# Patient Record
Sex: MALE | Race: WHITE | Employment: OTHER | URBAN - METROPOLITAN AREA
[De-identification: names, ages, dates, MRNs, and addresses within clinical notes are randomized per-mention and may not be internally consistent; named-entity substitution may affect disease eponyms.]

---

## 2021-01-22 ENCOUNTER — IMMUNIZATION (OUTPATIENT)
Dept: PRIMARY CARE CLINIC | Age: 81
End: 2021-01-22
Payer: MEDICARE

## 2021-01-22 PROCEDURE — 0001A COVID-19, PFIZER VACCINE 30MCG/0.3ML DOSE: CPT | Performed by: PHYSICIAN ASSISTANT

## 2021-01-22 PROCEDURE — 91300 COVID-19, PFIZER VACCINE 30MCG/0.3ML DOSE: CPT | Performed by: PHYSICIAN ASSISTANT

## 2021-02-12 ENCOUNTER — IMMUNIZATION (OUTPATIENT)
Dept: PRIMARY CARE CLINIC | Age: 81
End: 2021-02-12
Payer: MEDICARE

## 2021-02-12 PROCEDURE — 91300 COVID-19, PFIZER VACCINE 30MCG/0.3ML DOSE: CPT | Performed by: CLINICAL NURSE SPECIALIST

## 2021-02-12 PROCEDURE — 0002A COVID-19, PFIZER VACCINE 30MCG/0.3ML DOSE: CPT | Performed by: CLINICAL NURSE SPECIALIST

## 2021-10-11 ENCOUNTER — HOSPITAL ENCOUNTER (INPATIENT)
Age: 81
LOS: 14 days | Discharge: ANOTHER HOSPITAL | DRG: 287 | End: 2021-10-25
Attending: EMERGENCY MEDICINE | Admitting: FAMILY MEDICINE
Payer: MEDICARE

## 2021-10-11 ENCOUNTER — APPOINTMENT (OUTPATIENT)
Dept: GENERAL RADIOLOGY | Age: 81
DRG: 287 | End: 2021-10-11
Payer: MEDICARE

## 2021-10-11 DIAGNOSIS — R77.8 ELEVATED TROPONIN: ICD-10-CM

## 2021-10-11 DIAGNOSIS — F51.01 PRIMARY INSOMNIA: ICD-10-CM

## 2021-10-11 DIAGNOSIS — N17.9 ACUTE KIDNEY INJURY (HCC): Primary | ICD-10-CM

## 2021-10-11 LAB
ADENOVIRUS BY PCR: NOT DETECTED
ALBUMIN SERPL-MCNC: 3.5 G/DL (ref 3.5–5.2)
ALP BLD-CCNC: 110 U/L (ref 40–129)
ALT SERPL-CCNC: 23 U/L (ref 0–40)
ANION GAP SERPL CALCULATED.3IONS-SCNC: 14 MMOL/L (ref 7–16)
AST SERPL-CCNC: 57 U/L (ref 0–39)
BASOPHILS ABSOLUTE: 0.05 E9/L (ref 0–0.2)
BASOPHILS RELATIVE PERCENT: 0.5 % (ref 0–2)
BILIRUB SERPL-MCNC: 0.4 MG/DL (ref 0–1.2)
BORDETELLA PARAPERTUSSIS BY PCR: NOT DETECTED
BORDETELLA PERTUSSIS BY PCR: NOT DETECTED
BUN BLDV-MCNC: 31 MG/DL (ref 6–23)
CALCIUM SERPL-MCNC: 9.7 MG/DL (ref 8.6–10.2)
CHLAMYDOPHILIA PNEUMONIAE BY PCR: NOT DETECTED
CHLORIDE BLD-SCNC: 98 MMOL/L (ref 98–107)
CO2: 17 MMOL/L (ref 22–29)
CORONAVIRUS 229E BY PCR: NOT DETECTED
CORONAVIRUS HKU1 BY PCR: NOT DETECTED
CORONAVIRUS NL63 BY PCR: NOT DETECTED
CORONAVIRUS OC43 BY PCR: NOT DETECTED
CREAT SERPL-MCNC: 2.9 MG/DL (ref 0.7–1.2)
EOSINOPHILS ABSOLUTE: 0 E9/L (ref 0.05–0.5)
EOSINOPHILS RELATIVE PERCENT: 0 % (ref 0–6)
GFR AFRICAN AMERICAN: 25
GFR NON-AFRICAN AMERICAN: 21 ML/MIN/1.73
GLUCOSE BLD-MCNC: 161 MG/DL (ref 74–99)
HCT VFR BLD CALC: 42.1 % (ref 37–54)
HEMOGLOBIN: 13.4 G/DL (ref 12.5–16.5)
HUMAN METAPNEUMOVIRUS BY PCR: NOT DETECTED
HUMAN RHINOVIRUS/ENTEROVIRUS BY PCR: NOT DETECTED
IMMATURE GRANULOCYTES #: 0.1 E9/L
IMMATURE GRANULOCYTES %: 1 % (ref 0–5)
INFLUENZA A BY PCR: NOT DETECTED
INFLUENZA B BY PCR: NOT DETECTED
LYMPHOCYTES ABSOLUTE: 0.76 E9/L (ref 1.5–4)
LYMPHOCYTES RELATIVE PERCENT: 7.4 % (ref 20–42)
MAGNESIUM: 2.1 MG/DL (ref 1.6–2.6)
MCH RBC QN AUTO: 26.2 PG (ref 26–35)
MCHC RBC AUTO-ENTMCNC: 31.8 % (ref 32–34.5)
MCV RBC AUTO: 82.4 FL (ref 80–99.9)
MONOCYTES ABSOLUTE: 0.75 E9/L (ref 0.1–0.95)
MONOCYTES RELATIVE PERCENT: 7.3 % (ref 2–12)
MYCOPLASMA PNEUMONIAE BY PCR: NOT DETECTED
NEUTROPHILS ABSOLUTE: 8.59 E9/L (ref 1.8–7.3)
NEUTROPHILS RELATIVE PERCENT: 83.8 % (ref 43–80)
PARAINFLUENZA VIRUS 1 BY PCR: NOT DETECTED
PARAINFLUENZA VIRUS 2 BY PCR: NOT DETECTED
PARAINFLUENZA VIRUS 3 BY PCR: NOT DETECTED
PARAINFLUENZA VIRUS 4 BY PCR: NOT DETECTED
PDW BLD-RTO: 13.9 FL (ref 11.5–15)
PLATELET # BLD: 196 E9/L (ref 130–450)
PMV BLD AUTO: 12.5 FL (ref 7–12)
POTASSIUM SERPL-SCNC: 4.6 MMOL/L (ref 3.5–5)
PRO-BNP: 7283 PG/ML (ref 0–450)
RBC # BLD: 5.11 E12/L (ref 3.8–5.8)
RESPIRATORY SYNCYTIAL VIRUS BY PCR: NOT DETECTED
SARS-COV-2, NAAT: NOT DETECTED
SARS-COV-2, PCR: NOT DETECTED
SODIUM BLD-SCNC: 129 MMOL/L (ref 132–146)
TOTAL PROTEIN: 7.4 G/DL (ref 6.4–8.3)
TROPONIN, HIGH SENSITIVITY: 50 NG/L (ref 0–11)
TROPONIN, HIGH SENSITIVITY: 54 NG/L (ref 0–11)
WBC # BLD: 10.3 E9/L (ref 4.5–11.5)

## 2021-10-11 PROCEDURE — 93005 ELECTROCARDIOGRAM TRACING: CPT | Performed by: NURSE PRACTITIONER

## 2021-10-11 PROCEDURE — 84484 ASSAY OF TROPONIN QUANT: CPT

## 2021-10-11 PROCEDURE — 6370000000 HC RX 637 (ALT 250 FOR IP): Performed by: FAMILY MEDICINE

## 2021-10-11 PROCEDURE — 85025 COMPLETE CBC W/AUTO DIFF WBC: CPT

## 2021-10-11 PROCEDURE — 87635 SARS-COV-2 COVID-19 AMP PRB: CPT

## 2021-10-11 PROCEDURE — 83880 ASSAY OF NATRIURETIC PEPTIDE: CPT

## 2021-10-11 PROCEDURE — 80053 COMPREHEN METABOLIC PANEL: CPT

## 2021-10-11 PROCEDURE — 36415 COLL VENOUS BLD VENIPUNCTURE: CPT

## 2021-10-11 PROCEDURE — 71046 X-RAY EXAM CHEST 2 VIEWS: CPT

## 2021-10-11 PROCEDURE — 99283 EMERGENCY DEPT VISIT LOW MDM: CPT

## 2021-10-11 PROCEDURE — 2060000000 HC ICU INTERMEDIATE R&B

## 2021-10-11 PROCEDURE — 2580000003 HC RX 258: Performed by: GENERAL PRACTICE

## 2021-10-11 PROCEDURE — 0202U NFCT DS 22 TRGT SARS-COV-2: CPT

## 2021-10-11 PROCEDURE — 83735 ASSAY OF MAGNESIUM: CPT

## 2021-10-11 PROCEDURE — 2580000003 HC RX 258: Performed by: NURSE PRACTITIONER

## 2021-10-11 RX ORDER — SODIUM CHLORIDE 9 MG/ML
INJECTION, SOLUTION INTRAVENOUS CONTINUOUS
Status: DISCONTINUED | OUTPATIENT
Start: 2021-10-11 | End: 2021-10-13

## 2021-10-11 RX ORDER — POLYETHYLENE GLYCOL 3350 17 G/17G
17 POWDER, FOR SOLUTION ORAL DAILY PRN
Status: DISCONTINUED | OUTPATIENT
Start: 2021-10-11 | End: 2021-10-25 | Stop reason: HOSPADM

## 2021-10-11 RX ORDER — ACETAMINOPHEN 650 MG/1
650 SUPPOSITORY RECTAL EVERY 6 HOURS PRN
Status: DISCONTINUED | OUTPATIENT
Start: 2021-10-11 | End: 2021-10-25 | Stop reason: HOSPADM

## 2021-10-11 RX ORDER — 0.9 % SODIUM CHLORIDE 0.9 %
500 INTRAVENOUS SOLUTION INTRAVENOUS ONCE
Status: COMPLETED | OUTPATIENT
Start: 2021-10-11 | End: 2021-10-11

## 2021-10-11 RX ORDER — 0.9 % SODIUM CHLORIDE 0.9 %
1000 INTRAVENOUS SOLUTION INTRAVENOUS ONCE
Status: COMPLETED | OUTPATIENT
Start: 2021-10-11 | End: 2021-10-11

## 2021-10-11 RX ORDER — ONDANSETRON 4 MG/1
4 TABLET, ORALLY DISINTEGRATING ORAL EVERY 8 HOURS PRN
Status: DISCONTINUED | OUTPATIENT
Start: 2021-10-11 | End: 2021-10-25 | Stop reason: HOSPADM

## 2021-10-11 RX ORDER — ACETAMINOPHEN 325 MG/1
650 TABLET ORAL EVERY 6 HOURS PRN
Status: DISCONTINUED | OUTPATIENT
Start: 2021-10-11 | End: 2021-10-25 | Stop reason: HOSPADM

## 2021-10-11 RX ORDER — AMLODIPINE BESYLATE 10 MG/1
10 TABLET ORAL DAILY
Status: DISCONTINUED | OUTPATIENT
Start: 2021-10-12 | End: 2021-10-15

## 2021-10-11 RX ORDER — ROSUVASTATIN CALCIUM 10 MG/1
10 TABLET, COATED ORAL EVERY EVENING
Status: DISCONTINUED | OUTPATIENT
Start: 2021-10-11 | End: 2021-10-25 | Stop reason: HOSPADM

## 2021-10-11 RX ORDER — ONDANSETRON 2 MG/ML
4 INJECTION INTRAMUSCULAR; INTRAVENOUS EVERY 6 HOURS PRN
Status: DISCONTINUED | OUTPATIENT
Start: 2021-10-11 | End: 2021-10-20

## 2021-10-11 RX ADMIN — ROSUVASTATIN 10 MG: 10 TABLET, FILM COATED ORAL at 23:35

## 2021-10-11 RX ADMIN — SODIUM CHLORIDE 1000 ML: 9 INJECTION, SOLUTION INTRAVENOUS at 22:00

## 2021-10-11 RX ADMIN — SODIUM CHLORIDE 500 ML: 9 INJECTION, SOLUTION INTRAVENOUS at 20:14

## 2021-10-11 ASSESSMENT — ENCOUNTER SYMPTOMS
ABDOMINAL PAIN: 0
EYE DISCHARGE: 0
NAUSEA: 1
SORE THROAT: 0
BACK PAIN: 0
COUGH: 0
SHORTNESS OF BREATH: 0
EYE PAIN: 0
VOMITING: 0
SINUS PRESSURE: 0
WHEEZING: 0
EYE REDNESS: 0
DIARRHEA: 0

## 2021-10-11 NOTE — ED NOTES
FIRST PROVIDER CONTACT ASSESSMENT NOTE      Department of Emergency Medicine   Admit Date: No admission date for patient encounter. Chief Complaint: Fatigue (fatigue x1 week) and Extremity Weakness (weakness x1week)      History of Present Illness:    Abisai Cordero is a [de-identified] y.o. male who presents to the ED for fatigue, weakness, body aches, chills. Is fully vaccinated.       -----------------END OF FIRST PROVIDER CONTACT ASSESSMENT NOTE--------------  Electronically signed by CHOCO Lynn CNP   DD: 10/11/21               CHOCO Lott CNP  10/11/21 1041

## 2021-10-11 NOTE — LETTER
[place] Department Medicaid  CERTIFICATION OF NECESSITY  FOR NON-EMERGENCY TRANSPORTATION   BY GROUND AMBULANCE      Individual Information   1. Name: Meme Hollingsworth 2. [place] Medicaid Billing Number:    3. Address: [facility]      Transportation Provider Information   4. Provider Name: [facility]   5. [place] Medicaid Provider Number:  National Provider Identifier (NPI):      Certification  7. Criteria:  During transport, this individual requires:  [x] Medical treatment or continuous     supervision by an EMT. [] The administration or regulation of oxygen by another person. [] Supervised protective restraint. 8. Period Beginning Date: 10/25/21   9. Length  [x] Not more than 5 day(s)  [] One Year     Additional Information Relevant to Certification   10. Comments or Explanations, If Necessary or Appropriate     Going to a higher level of care for Cardiac workup and possible CABG that can  Not be done at this Facility. Certifying Practitioner Information   11. Name of Practitioner: Dr Judy Hamilton MD   12. [place] Medicaid Provider Number, If Applicable:  [address] Provider Identifier (NPI):      Signature Information   14. Date of Signature: 10/25/21 15. Name of Person Signing: Jamal Schmidt   12. Signature and Professional Designation: Electronically signed by GORDON Flores on 10/25/2021 at 10:30 AM     OD 96431  Rev. 7/2015    [facility] Encounter Date/Time: 10/11/2021 [address] Account: [de-identified]    MRN: [id number]    Patient: Meme Hollingsworth    Contact Serial #: [id number]      ENCOUNTER          Patient Class: I Private Enc?   No Unit RM BD: SEYZ 8SE [facility] [id number]/[id number]   Hospital Service: Intermediate   Encounter DX: Elevated troponin [R77.8]   ADM Provider: Charly Shine MD   Procedure:     ATT Provider: Charly Shine MD   REF Provider:        Admission DX: Elevated troponin, Acute kidney injury ([facility] [address] and DX codes: R77.8, N17.9    PATIENT                 Name: Sasha Flaherty : 1940 (81 yrs)   Address: [place] Sex: Male   [place] city: [facility] [address]         Marital Status:    Employer: RETIRED         Protestant: [employer]   Primary Care Provider: Melvin Zacarias MD         Primary Phone: [phone]   EMERGENCY CONTACT   Contact Name Legal Guardian? Relationship to Patient Home Phone Work Phone   1. Verla Cough  2. Javier Fuller      Spouse  Child [phone]              GUARANTOR            Guarantor: Sasha Flaherty     : 1940   Address: [address] [place] [address] Sex: [place]     [place] [address]     Relation to Patient: Self       Home Phone: [phone]   Guarantor ID: [id number]       Work Phone:     Guarantor Employer: RETIRED         Status: RETIRED      COVERAGE        PRIMARY INSURANCE   Payor: MEDICARE Plan: MEDICARE PART A AND B   Payor Address: [facility],  [facility], [facility]       Group Number:   Insurance Type: INDEMNITY   Subscriber Name: Mireille Weller : 1940   Subscriber ID: [id number] Tomy Bun. Rel. to Sub: Self   SECONDARY INSURANCE   Payor: UNITED HEALTHCARE Plan: [email or web address] Address:  [facility] [id number], [place] [address]          Group Number:   Insurance Type: INDEMNITY   Subscriber Name: Mireille Weller : 1940   Subscriber ID: [id number] Pat.  Rel. to Sub: SELF           CSN: [id number]

## 2021-10-11 NOTE — LETTER
Beneficiary Notification Letter  BPCI Advanced     Your Doctor or [address],    We wanted to let you know that your health care provider, Natacha Salmeron, has volunteered to take part in our [facility] for [employer]in [employer] Medicaid Services (CMS) Bundled Payments for [facility] (BPCI Advanced). This doesnt change your Medicare rights or benefits and you dont need to do anything. What are bundled payments? A bundled payment combines, or bundles together, payments that Medicare makes to your health care providers for the many different kinds of medical services you might get in a specific time period. In BPCI Advanced, this time period could include a hospital inpatient stay or outpatient procedure, plus 90 days. Why would Medicare bundle payments? Bundled payments are thought of as a value-based way to pay because health care providers are responsible for both the quality and cost of medical care they give. This is a relatively new way of paying health care providers compared to thefee-for-service way Medicare has traditionally paid, where providers are paid separately for each service they provide. Bundled payments encourage these providers to work together to provide better, more coordinated care during your hospital stay, or outpatient procedure, and through your recovery. What does BPCI Advance mean for me? Youre more likely to get even better care when hospitals, doctors, and other health care providers work together. In BPCI Advanced, hospitals, doctors, and other health care providers may be rewarded for providing better, more coordinated health care. Medicare will watch BPCI Advanced participants closely to make sure that you and other patients keep getting efficient, high quality care. What do I need to know about BPCI Advanced?   Whats most important for you to know is that your Medicare rights and benefits wont change because your health care provider is participating in [facility]. Medicare will keep covering all of your medically necessary services. Even though Medicare will pay your doctor in a different way under BPCI Advanced, how much you have to pay wont change. Health care providers and suppliers who are enrolled in Medicare will submit their Medicare claims like they always have. Youll have all the same Medicare rights and protections, including the right to choose which hospital, doctor, or other health care provider you see. If you dont want to get care from a health care provider whos participating in [address] [place], then youll have to choose a different health care provider whos not participating in the Model. How can I give feedback about my health care? Medicare might ask you to take a voluntary survey about the services and care you received from [address] [place] [address] during your hospital stay or outpatient procedure and for a specific period of time afterwards. You can decide whether you want to take the voluntary survey, but if you do, itll help Medicare make BPCI Advanced and the care of other Medicare patients better. If you have concerns or complaints about your care, you can:   · Talk to your doctor or health care provider. · Contact your Beneficiary and Family Centered Care Quality Improvement   Organization [facility]). You can get your BFCC-QIOs phone number  at  Medicare.gov/contacts or by calling 1-800-MEDICARE. TTY users can call  4-792.858.6377. Where can I learn more about BPCI Advanced? Learn more about BPCI Advanced at https://innovation.cms.gov/initiatives/bpci-advanced/:  · A list of all the hospitals and physician group practices in the country participating in [address] [place] [address]. · All of the inpatient and outpatient Clinical Episodes that are currently included under BPCI Advanced.  A Clinical Episode is a grouping of medical conditions or diagnoses that are included in the [address].

## 2021-10-12 ENCOUNTER — APPOINTMENT (OUTPATIENT)
Dept: ULTRASOUND IMAGING | Age: 81
DRG: 287 | End: 2021-10-12
Payer: MEDICARE

## 2021-10-12 ENCOUNTER — APPOINTMENT (OUTPATIENT)
Dept: NUCLEAR MEDICINE | Age: 81
DRG: 287 | End: 2021-10-12
Payer: MEDICARE

## 2021-10-12 LAB
ALBUMIN SERPL-MCNC: 3.4 G/DL (ref 3.5–5.2)
ALP BLD-CCNC: 110 U/L (ref 40–129)
ALT SERPL-CCNC: 29 U/L (ref 0–40)
ANION GAP SERPL CALCULATED.3IONS-SCNC: 13 MMOL/L (ref 7–16)
AST SERPL-CCNC: 89 U/L (ref 0–39)
BACTERIA: ABNORMAL /HPF
BASOPHILS ABSOLUTE: 0.06 E9/L (ref 0–0.2)
BASOPHILS RELATIVE PERCENT: 0.6 % (ref 0–2)
BILIRUB SERPL-MCNC: 0.4 MG/DL (ref 0–1.2)
BILIRUBIN URINE: ABNORMAL
BLOOD, URINE: ABNORMAL
BUN BLDV-MCNC: 34 MG/DL (ref 6–23)
CALCIUM SERPL-MCNC: 9.5 MG/DL (ref 8.6–10.2)
CHLORIDE BLD-SCNC: 102 MMOL/L (ref 98–107)
CLARITY: ABNORMAL
CO2: 17 MMOL/L (ref 22–29)
COLOR: YELLOW
CREAT SERPL-MCNC: 2.6 MG/DL (ref 0.7–1.2)
D DIMER: 2103 NG/ML DDU
EKG ATRIAL RATE: 73 BPM
EKG P AXIS: 71 DEGREES
EKG P-R INTERVAL: 210 MS
EKG Q-T INTERVAL: 362 MS
EKG QRS DURATION: 70 MS
EKG QTC CALCULATION (BAZETT): 398 MS
EKG R AXIS: 20 DEGREES
EKG T AXIS: -57 DEGREES
EKG VENTRICULAR RATE: 73 BPM
EOSINOPHILS ABSOLUTE: 0.01 E9/L (ref 0.05–0.5)
EOSINOPHILS RELATIVE PERCENT: 0.1 % (ref 0–6)
GFR AFRICAN AMERICAN: 29
GFR NON-AFRICAN AMERICAN: 24 ML/MIN/1.73
GLUCOSE BLD-MCNC: 149 MG/DL (ref 74–99)
GLUCOSE URINE: NEGATIVE MG/DL
HBA1C MFR BLD: 6.4 % (ref 4–5.6)
HCT VFR BLD CALC: 41.6 % (ref 37–54)
HEMOGLOBIN: 13.3 G/DL (ref 12.5–16.5)
IMMATURE GRANULOCYTES #: 0.09 E9/L
IMMATURE GRANULOCYTES %: 0.9 % (ref 0–5)
KETONES, URINE: NEGATIVE MG/DL
LEUKOCYTE ESTERASE, URINE: NEGATIVE
LYMPHOCYTES ABSOLUTE: 1.28 E9/L (ref 1.5–4)
LYMPHOCYTES RELATIVE PERCENT: 13 % (ref 20–42)
MCH RBC QN AUTO: 26.7 PG (ref 26–35)
MCHC RBC AUTO-ENTMCNC: 32 % (ref 32–34.5)
MCV RBC AUTO: 83.4 FL (ref 80–99.9)
METER GLUCOSE: 116 MG/DL (ref 74–99)
METER GLUCOSE: 134 MG/DL (ref 74–99)
METER GLUCOSE: 135 MG/DL (ref 74–99)
METER GLUCOSE: 163 MG/DL (ref 74–99)
MONOCYTES ABSOLUTE: 0.78 E9/L (ref 0.1–0.95)
MONOCYTES RELATIVE PERCENT: 8 % (ref 2–12)
NEUTROPHILS ABSOLUTE: 7.59 E9/L (ref 1.8–7.3)
NEUTROPHILS RELATIVE PERCENT: 77.4 % (ref 43–80)
NITRITE, URINE: NEGATIVE
PDW BLD-RTO: 14.1 FL (ref 11.5–15)
PH UA: 5.5 (ref 5–9)
PLATELET # BLD: 199 E9/L (ref 130–450)
PMV BLD AUTO: 12.3 FL (ref 7–12)
POTASSIUM REFLEX MAGNESIUM: 4.6 MMOL/L (ref 3.5–5)
PROTEIN UA: 30 MG/DL
RBC # BLD: 4.99 E12/L (ref 3.8–5.8)
RBC UA: ABNORMAL /HPF (ref 0–2)
SODIUM BLD-SCNC: 132 MMOL/L (ref 132–146)
SPECIFIC GRAVITY UA: >=1.03 (ref 1–1.03)
TOTAL PROTEIN: 7.4 G/DL (ref 6.4–8.3)
TROPONIN, HIGH SENSITIVITY: 165 NG/L (ref 0–11)
UROBILINOGEN, URINE: 0.2 E.U./DL
WBC # BLD: 9.8 E9/L (ref 4.5–11.5)
WBC UA: ABNORMAL /HPF (ref 0–5)

## 2021-10-12 PROCEDURE — 2580000003 HC RX 258: Performed by: FAMILY MEDICINE

## 2021-10-12 PROCEDURE — 84484 ASSAY OF TROPONIN QUANT: CPT

## 2021-10-12 PROCEDURE — 2060000000 HC ICU INTERMEDIATE R&B

## 2021-10-12 PROCEDURE — 80053 COMPREHEN METABOLIC PANEL: CPT

## 2021-10-12 PROCEDURE — 99222 1ST HOSP IP/OBS MODERATE 55: CPT | Performed by: INTERNAL MEDICINE

## 2021-10-12 PROCEDURE — 6370000000 HC RX 637 (ALT 250 FOR IP): Performed by: FAMILY MEDICINE

## 2021-10-12 PROCEDURE — 36415 COLL VENOUS BLD VENIPUNCTURE: CPT

## 2021-10-12 PROCEDURE — 78582 LUNG VENTILAT&PERFUS IMAGING: CPT

## 2021-10-12 PROCEDURE — 85025 COMPLETE CBC W/AUTO DIFF WBC: CPT

## 2021-10-12 PROCEDURE — 3430000000 HC RX DIAGNOSTIC RADIOPHARMACEUTICAL: Performed by: STUDENT IN AN ORGANIZED HEALTH CARE EDUCATION/TRAINING PROGRAM

## 2021-10-12 PROCEDURE — 6370000000 HC RX 637 (ALT 250 FOR IP): Performed by: INTERNAL MEDICINE

## 2021-10-12 PROCEDURE — A9539 TC99M PENTETATE: HCPCS | Performed by: STUDENT IN AN ORGANIZED HEALTH CARE EDUCATION/TRAINING PROGRAM

## 2021-10-12 PROCEDURE — 6360000002 HC RX W HCPCS: Performed by: FAMILY MEDICINE

## 2021-10-12 PROCEDURE — 85378 FIBRIN DEGRADE SEMIQUANT: CPT

## 2021-10-12 PROCEDURE — 81001 URINALYSIS AUTO W/SCOPE: CPT

## 2021-10-12 PROCEDURE — 76770 US EXAM ABDO BACK WALL COMP: CPT

## 2021-10-12 PROCEDURE — 83036 HEMOGLOBIN GLYCOSYLATED A1C: CPT

## 2021-10-12 PROCEDURE — 93010 ELECTROCARDIOGRAM REPORT: CPT | Performed by: INTERNAL MEDICINE

## 2021-10-12 PROCEDURE — 87088 URINE BACTERIA CULTURE: CPT

## 2021-10-12 PROCEDURE — APPSS60 APP SPLIT SHARED TIME 46-60 MINUTES: Performed by: PHYSICIAN ASSISTANT

## 2021-10-12 PROCEDURE — A9540 TC99M MAA: HCPCS | Performed by: STUDENT IN AN ORGANIZED HEALTH CARE EDUCATION/TRAINING PROGRAM

## 2021-10-12 PROCEDURE — 93970 EXTREMITY STUDY: CPT

## 2021-10-12 PROCEDURE — 82962 GLUCOSE BLOOD TEST: CPT

## 2021-10-12 RX ORDER — DEXTROSE MONOHYDRATE 50 MG/ML
100 INJECTION, SOLUTION INTRAVENOUS PRN
Status: DISCONTINUED | OUTPATIENT
Start: 2021-10-12 | End: 2021-10-25 | Stop reason: HOSPADM

## 2021-10-12 RX ORDER — INSULIN DETEMIR 100 [IU]/ML
25 INJECTION, SOLUTION SUBCUTANEOUS 2 TIMES DAILY
Status: ON HOLD | COMMUNITY
End: 2021-10-25 | Stop reason: HOSPADM

## 2021-10-12 RX ORDER — LOSARTAN POTASSIUM 50 MG/1
50 TABLET ORAL DAILY
Status: ON HOLD | COMMUNITY
End: 2021-10-25 | Stop reason: HOSPADM

## 2021-10-12 RX ORDER — DEXTROSE MONOHYDRATE 25 G/50ML
12.5 INJECTION, SOLUTION INTRAVENOUS PRN
Status: DISCONTINUED | OUTPATIENT
Start: 2021-10-12 | End: 2021-10-25 | Stop reason: HOSPADM

## 2021-10-12 RX ORDER — HEPARIN SODIUM 10000 [USP'U]/ML
5000 INJECTION, SOLUTION INTRAVENOUS; SUBCUTANEOUS EVERY 8 HOURS
Status: DISCONTINUED | OUTPATIENT
Start: 2021-10-13 | End: 2021-10-15 | Stop reason: SDUPTHER

## 2021-10-12 RX ORDER — ATORVASTATIN CALCIUM 20 MG/1
20 TABLET, FILM COATED ORAL DAILY
COMMUNITY

## 2021-10-12 RX ORDER — FENOFIBRATE 160 MG/1
160 TABLET ORAL DAILY
Status: ON HOLD | COMMUNITY
End: 2021-10-25 | Stop reason: HOSPADM

## 2021-10-12 RX ORDER — OXYBUTYNIN CHLORIDE 5 MG/1
5 TABLET ORAL DAILY
Status: ON HOLD | COMMUNITY
End: 2021-10-25 | Stop reason: HOSPADM

## 2021-10-12 RX ORDER — NICOTINE POLACRILEX 4 MG
15 LOZENGE BUCCAL PRN
Status: DISCONTINUED | OUTPATIENT
Start: 2021-10-12 | End: 2021-10-25 | Stop reason: HOSPADM

## 2021-10-12 RX ORDER — KIT FOR THE PREPARATION OF TECHNETIUM TC 99M PENTETATE 20 MG/1
63 INJECTION, POWDER, LYOPHILIZED, FOR SOLUTION INTRAVENOUS; RESPIRATORY (INHALATION)
Status: COMPLETED | OUTPATIENT
Start: 2021-10-12 | End: 2021-10-12

## 2021-10-12 RX ORDER — TAMSULOSIN HYDROCHLORIDE 0.4 MG/1
0.4 CAPSULE ORAL NIGHTLY
Status: ON HOLD | COMMUNITY
End: 2021-10-25 | Stop reason: HOSPADM

## 2021-10-12 RX ORDER — SODIUM BICARBONATE 650 MG/1
650 TABLET ORAL 2 TIMES DAILY
Status: DISCONTINUED | OUTPATIENT
Start: 2021-10-12 | End: 2021-10-13

## 2021-10-12 RX ORDER — INSULIN ASPART 100 [IU]/ML
0-32 INJECTION, SOLUTION INTRAVENOUS; SUBCUTANEOUS
Status: ON HOLD | COMMUNITY
End: 2021-10-25 | Stop reason: HOSPADM

## 2021-10-12 RX ADMIN — Medication 7.5 MILLICURIE: at 13:14

## 2021-10-12 RX ADMIN — AMLODIPINE BESYLATE 10 MG: 10 TABLET ORAL at 08:56

## 2021-10-12 RX ADMIN — SODIUM BICARBONATE 650 MG: 650 TABLET ORAL at 20:39

## 2021-10-12 RX ADMIN — KIT FOR THE PREPARATION OF TECHNETIUM TC 99M PENTETATE 63 MILLICURIE: 20 INJECTION, POWDER, LYOPHILIZED, FOR SOLUTION INTRAVENOUS; RESPIRATORY (INHALATION) at 13:14

## 2021-10-12 RX ADMIN — SODIUM CHLORIDE: 9 INJECTION, SOLUTION INTRAVENOUS at 20:39

## 2021-10-12 RX ADMIN — SODIUM CHLORIDE: 9 INJECTION, SOLUTION INTRAVENOUS at 09:47

## 2021-10-12 RX ADMIN — SODIUM BICARBONATE 650 MG: 650 TABLET ORAL at 16:25

## 2021-10-12 RX ADMIN — INSULIN LISPRO 2 UNITS: 100 INJECTION, SOLUTION INTRAVENOUS; SUBCUTANEOUS at 16:27

## 2021-10-12 RX ADMIN — ENOXAPARIN SODIUM 30 MG: 30 INJECTION SUBCUTANEOUS at 08:56

## 2021-10-12 ASSESSMENT — PAIN SCALES - GENERAL
PAINLEVEL_OUTOF10: 0

## 2021-10-12 NOTE — PROGRESS NOTES
Cardiology and pulmonology consults placed via [employer] serve. Unable to place nephrology consult at this time due to morning labs not being resulted.

## 2021-10-12 NOTE — CONSULTS
Nephrology Consult  [facility]  Yenifer Austin MD    CC:   arf    HPI:   The pt is an [de-identified] yo male with a pmh of dm, htn, hyperlipidemia, djd, prostate cancer 1999, ckd 3 b with baseline cr of 1.5 from 2014. He came in with weakness, sob, cough. He was given 2 L of saline in the er. Admission labs show na 129>132, co2 17, bun 34, cr 2.9>2.6, ca 9.5, alb 3.4, wbc 9.8, hgb 13.3, plt 199. He was started on ns at 100 ml/hr. outpt cozaar is on hold. shirley shows no hydro. He was given bactrim last week for a uti. His oral intake has been low. PMH:    Past Medical History:   Diagnosis Date    Acute pancreatitis     Diabetes mellitus ([facility]     History of snoring     HTN (hypertension)     Hyperlipidemia     Osteoarthritis     Preoperative clearance 8/29/2014    cardiac- Dr. Danita Soni; note in [facility] for surgery 9/23/2014    Preoperative clearance 9-19-14    medical clearance for OR 9-23-14 from Dr. Shelly Muller on paper chart    Prostate cancer [facility]) 1999    radiation, seed implant    Renal insufficiency        Patient Active Problem List   Diagnosis    HTN (hypertension)    Diabetes ([address]    Hyperlipidemia    Renal insufficiency    Osteoarthritis, knee    Acute kidney injury ([facility]       Meds:     insulin lispro  0-12 Units SubCUTAneous TID WC    insulin lispro  0-6 Units SubCUTAneous Nightly    [START ON 10/13/2021] heparin (porcine)  5,000 Units SubCUTAneous Q8H    amLODIPine  10 mg Oral Daily    rosuvastatin  10 mg Oral QPM        dextrose      sodium chloride 100 mL/hr at 10/12/21 0947       Meds prn:     glucose, dextrose, glucagon (rDNA), dextrose, perflutren lipid microspheres, ondansetron **OR** ondansetron, polyethylene glycol, acetaminophen **OR** acetaminophen    Meds prior to admission:     No current facility-administered medications on file prior to encounter.      Current Outpatient Medications on File Prior to Encounter   Medication Sig Dispense Refill    oxybutynin (DITROPAN) 5 MG tablet Take 5 mg by mouth daily      atorvastatin (LIPITOR) 20 MG tablet Take 20 mg by mouth daily      losartan (COZAAR) 50 MG tablet Take 50 mg by mouth daily      fenofibrate (TRIGLIDE) 160 MG tablet Take 160 mg by mouth daily      tamsulosin (FLOMAX) 0.4 MG capsule Take 0.4 mg by mouth nightly      insulin detemir (LEVEMIR FLEXTOUCH) 100 UNIT/ML injection pen Inject 25 Units into the skin 2 times daily      insulin aspart (NOVOLOG FLEXPEN) 100 UNIT/ML injection pen Inject 0-32 Units into the skin 3 times daily (before meals) *Per Sliding Scale*      amLODIPine (NORVASC) 10 MG tablet Take 10 mg by mouth daily. Instructed to take morning of surgery with a sip of water         Allergies:    Patient has no known allergies. Social History:     reports that he has never smoked. He has never used smokeless tobacco. He reports current alcohol use. He reports that he does not use drugs.     Family History:         Problem Relation Age of Onset    Heart Disease Father          age 72       ROS:     General: no fever, chills   Heent: no nasal congestion, sore throat   Resp: co sob  cough  Cardiac: no cp , le edema, palpitations  Gi: no nausea, vomiting, melena, abd pain, hematemesis  Gu: no hematuria, dysuria   Neruo: no numbness, weakness, headache, blurry vision   Endocrine:  no h/o dm  Derm: no rash , petechia  Heme: no epistaxis, bruising  All other sx negative     Physical Exam:      Patient Vitals for the past 24 hrs:   BP Temp Temp src Pulse Resp SpO2   10/12/21 0845 122/63 98.9 °F (37.2 °C) Oral 100 20 95 %   10/12/21 0600 110/80 98.5 °F (36.9 °C) -- 75 22 96 %   10/11/21 2336 122/85 -- -- 79 22 95 %   10/11/21 2015 118/80 98.7 °F (37.1 °C) -- 74 18 95 %       No intake or output data in the 24 hours ending 10/12/21 1236    Constitutional: Patient in no acute distress   Head: normocephalic, atraumatic   Neck: supple, no jvd  Cardiovascular: regular rate and rhythm, no murmurs, gallops, or rubs Respiratory: Clear, no rales, rhochi, or wheezes,   Gastrointestinal: soft, nontender, nondistended, no hepatosplenomegaly  Ext: tr edema  Neuro: aaox3  Skin: dry, no rash   Back: nontender    Data:    Recent Labs     10/11/21  1724 10/12/21  0855   WBC 10.3 9.8   HGB 13.4 13.3   HCT 42.1 41.6   MCV 82.4 83.4    199       Recent Labs     10/11/21  1724 10/12/21  0855   * 132   K 4.6 4.6   CL 98 102   CO2 17* 17*   CREATININE 2.9* 2.6*   BUN 31* 34*   LABGLOM 21 24   GLUCOSE 161* 149*   CALCIUM 9.7 9.5   MG 2.1  --        Vit D, 25-Hydroxy   Date Value Ref Range Status   09/25/2014 9 (L) 30 - 100 ng/mL Final     Comment:     <20 ng/mL. ........... Clayborne Hoguet Deficient  20-30 ng/mL. ......... Clayborne Hoguet Insufficient   ng/mL. ........ Clayborne Hoguet Sufficient  >100 ng/mL. .......... Clayborne Hoguet Toxic         PTH   Date Value Ref Range Status   09/25/2014 42 15 - 65 pg/mL Final       Recent Labs     10/11/21  1724 10/12/21  0855   ALT 23 29   AST 57* 89*   ALKPHOS 110 110   BILITOT 0.4 0.4       Recent Labs     10/11/21  1724 10/12/21  0855   LABALBU 3.5 3.4*       Ferritin   Date Value Ref Range Status   09/25/2014 205 ng/mL Final     Comment:     FERRITIN Reference Ranges:  Adult Males   20 - 60 yrars:    30 - 400 ng/mL  Adult females 17 - 60 years:    13 - 150 ng/mL  Adults greater than 60 years:   no established reference range  Pediatrics:                     no established reference range       Iron   Date Value Ref Range Status   09/25/2014 9 (L) 59 - 158 mcg/mL Final     TIBC   Date Value Ref Range Status   09/25/2014 334 250 - 450 mcg/dL Final       Vitamin B-12   Date Value Ref Range Status   09/25/2014 225 211 - 946 pg/mL Final       Folate   Date Value Ref Range Status   09/25/2014 13.0 7.3 - 26.1 ng/mL Final         Lab Results   Component Value Date    COLORU Yellow 10/12/2021    NITRU Negative 10/12/2021    GLUCOSEU Negative 10/12/2021    KETUA Negative 10/12/2021    UROBILINOGEN 0.2 10/12/2021    BILIRUBINUR SMALL 10/12/2021       No results found for: HARVEY, CREURRAN, MACREATRATIO, OSMOU    No components found for: URIC    No results found for: LIPIDPAN      Assessment and Plan:    1. Hyponatremia  Send urine lytes  Sp 2 L ns  On ivf at 100  Check tsh cortisol    2. arf  Baseline from 2014 of 1.5  Cr now 2.9>2.6 with ivf  No hydro on shirley  Check daily bmp  Screen for proteinuria  Holding cozaar  Sp bactrim last week    3. Sob  Echo ordered  Consider diuretic as cr improves  Uo? V/q p  cxr 10/11 no infiltrates    4. htn  Hold cozaar  On norvasc    5. Metabolic acidosis  In setting of ckd  Start oral hco3        Trinna Slipper.  Thea Muñiz MD

## 2021-10-12 NOTE — ED PROVIDER NOTES
ED  Provider Note  Admit Date/RoomTime: 10/11/2021  7:11 PM  ED Room: /     HPI:   Mesfin Bal is a [de-identified] y.o. male presenting to the ED for weakness, beginning 3 days ago. History comes primarily from the patient. Past medical history includes hypertension, hyperlipidemia, diabetes, morbid obesity. The complaint has been persistent, moderate in severity, improved by nothing and worsened by light exertion. Associated symptoms include none. Abner Mckeon had urinary frequency and some dysuria approximately 2 weeks ago, was assessed by his primary care provider and found to have a urinary tract infection. The patient was placed on a week of Bactrim and did have significant improvement in his symptoms with this medication. However upon completing this medication he began to develop nausea, weakness, fatigue. He states that his appetite is decreased as well. Because of these last several days of worsening constitutional symptoms, he presented to [facility] emergency department for further evaluation and treatment. On arrival, the patient was assessed with history, physical exam, imaging studies, laboratory studies and ekg, vital signs. Vital signs were stable on arrival and the patient was afebrile. Review of Systems   Constitutional: Positive for activity change, appetite change, chills, fatigue and fever. HENT: Negative for ear pain, sinus pressure and sore throat. Eyes: Negative for pain, discharge and redness. Respiratory: Negative for cough, shortness of breath and wheezing. Cardiovascular: Negative for chest pain. Gastrointestinal: Positive for nausea. Negative for abdominal pain, diarrhea and vomiting. Genitourinary: Positive for dysuria, frequency and urgency. Musculoskeletal: Negative for arthralgias and back pain. Skin: Negative for rash and wound. Neurological: Negative for weakness and headaches. Hematological: Negative for adenopathy.    All other systems reviewed and are negative. Physical Exam  Vitals and nursing note reviewed. Constitutional:       General: He is not in acute distress. Appearance: He is well-developed. He is obese. He is not ill-appearing or diaphoretic. HENT:      Head: Normocephalic and atraumatic. Mouth/Throat:      Dentition: Abnormal dentition. Eyes:      Pupils: Pupils are equal, round, and reactive to light. Neck:      Vascular: No JVD. Trachea: No tracheal deviation. Cardiovascular:      Rate and Rhythm: Regular rhythm. Heart sounds: No murmur heard. No friction rub. No gallop. Pulmonary:      Effort: Pulmonary effort is normal. No respiratory distress. Breath sounds: Normal breath sounds. No stridor. No wheezing or rales. Chest:      Chest wall: No tenderness. Abdominal:      General: Bowel sounds are normal. There is no distension. Palpations: Abdomen is soft. Tenderness: There is no abdominal tenderness. There is no right CVA tenderness, left CVA tenderness or guarding. Musculoskeletal:         General: Normal range of motion. Cervical back: Normal range of motion. Skin:     General: Skin is warm and dry. Neurological:      Mental Status: He is alert. Cranial Nerves: No cranial nerve deficit. Psychiatric:         Behavior: Behavior normal.          Procedures     MDM  Number of Diagnoses or Management Options  Acute kidney injury ([address]  Elevated troponin  Diagnosis management comments: Emergency department evaluation was notable for findings consistent with apparent acute kidney injury. Patient's last creatinine in our system was from several years ago, however the patient has increased from 1.5-2.9 and his creatinine and has a elevated troponin of 50. His troponin elevation may be secondary to decreased renal clearance, however delta troponin is pending at this time. Patient is also attempted to get a urinalysis with culture.   Diagnosis is presumptive acute kidney injury secondary to pyelonephritis from ascending UTI. Patient is at risk for further decompensation and would benefit from inpatient management and observation     This information was relayed to the patient who understood this plan of care and was amenable to the plan. Patient was discussed with the admitting service (Dr. Lamont Lara) who concurred with the decision for admission, and have agreed to admit the patient to telemetry       Amount and/or Complexity of Data Reviewed  Decide to obtain previous medical records or to obtain history from someone other than the patient: yes                   --------------------------------------------- PAST HISTORY ---------------------------------------------  Past Medical History:  has a past medical history of Acute pancreatitis, Diabetes mellitus ([facility] [address] History of snoring, HTN (hypertension), Hyperlipidemia, Osteoarthritis, Preoperative clearance, Preoperative clearance, Prostate cancer ([facility] [address] and Renal insufficiency. Past Surgical History:  has a past surgical history that includes Cholecystectomy (1995); Spine surgery (2007); Chest surgery (5/1985); Cataract removal with implant (Right, 10/2012); Cataract removal with implant (Left, 11/2012); Blepharoptosis Repair (Bilateral, 01/23/2014); knee surgery (Left, 1995); and knee surgery (Right, 03/1992). Social History:  reports that he has never smoked. He has never used smokeless tobacco. He reports current alcohol use. He reports that he does not use drugs. Family History: family history includes Heart Disease in his father. The patients home medications have been reviewed. Allergies: Patient has no known allergies.     -------------------------------------------------- RESULTS -------------------------------------------------    LABS:  Results for orders placed or performed during the hospital encounter of 10/11/21   COVID-19, Rapid    Specimen: Nasopharyngeal Swab   Result Value Ref Range    SARS-CoV-2, NAAT Not Detected Not Detected   Respiratory Panel, Molecular, with COVID-19 (Restricted: peds pts or suitable admitted adults)    Specimen: Nasopharyngeal   Result Value Ref Range    Adenovirus by PCR Not Detected Not Detected    Bordetella parapertussis by PCR Not Detected Not Detected    Bordetella pertussis by PCR Not Detected Not Detected    Chlamydophilia pneumoniae by PCR Not Detected Not Detected    Coronavirus 229E by PCR Not Detected Not Detected    Coronavirus HKU1 by PCR Not Detected Not Detected    Coronavirus NL63 by PCR Not Detected Not Detected    Coronavirus OC43 by PCR Not Detected Not Detected    SARS-CoV-2, PCR Not Detected Not Detected    Human Metapneumovirus by PCR Not Detected Not Detected    Human Rhinovirus/Enterovirus by PCR Not Detected Not Detected    Influenza A by PCR Not Detected Not Detected    Influenza B by PCR Not Detected Not Detected    Mycoplasma pneumoniae by PCR Not Detected Not Detected    Parainfluenza Virus 1 by PCR Not Detected Not Detected    Parainfluenza Virus 2 by PCR Not Detected Not Detected    Parainfluenza Virus 3 by PCR Not Detected Not Detected    Parainfluenza Virus 4 by PCR Not Detected Not Detected    Respiratory Syncytial Virus by PCR Not Detected Not Detected   Troponin   Result Value Ref Range    Troponin, High Sensitivity 50 (H) 0 - 11 ng/L   CBC Auto Differential   Result Value Ref Range    WBC 10.3 4.5 - 11.5 E9/L    RBC 5.11 3.80 - 5.80 E12/L    Hemoglobin 13.4 12.5 - 16.5 g/dL    Hematocrit 42.1 37.0 - 54.0 %    MCV 82.4 80.0 - 99.9 fL    MCH 26.2 26.0 - 35.0 pg    MCHC 31.8 (L) 32.0 - 34.5 %    RDW 13.9 11.5 - 15.0 fL    Platelets 431 451 - 356 E9/L    MPV 12.5 (H) 7.0 - 12.0 fL    Neutrophils % 83.8 (H) 43.0 - 80.0 %    Immature Granulocytes % 1.0 0.0 - 5.0 %    Lymphocytes % 7.4 (L) 20.0 - 42.0 %    Monocytes % 7.3 2.0 - 12.0 %    Eosinophils % 0.0 0.0 - 6.0 %    Basophils % 0.5 0.0 - 2.0 %    Neutrophils Absolute 8.59 (H) 1.80 - 7.30 E9/L    Immature Granulocytes # 0.10 E9/L    Lymphocytes Absolute 0.76 (L) 1.50 - 4.00 E9/L    Monocytes Absolute 0.75 0.10 - 0.95 E9/L    Eosinophils Absolute 0.00 (L) 0.05 - 0.50 E9/L    Basophils Absolute 0.05 0.00 - 0.20 E9/L   Comprehensive Metabolic Panel   Result Value Ref Range    Sodium 129 (L) 132 - 146 mmol/L    Potassium 4.6 3.5 - 5.0 mmol/L    Chloride 98 98 - 107 mmol/L    CO2 17 (L) 22 - 29 mmol/L    Anion Gap 14 7 - 16 mmol/L    Glucose 161 (H) 74 - 99 mg/dL    BUN 31 (H) 6 - 23 mg/dL    CREATININE 2.9 (H) 0.7 - 1.2 mg/dL    GFR Non-African American 21 >=60 mL/min/1.73    GFR African American 25     Calcium 9.7 8.6 - 10.2 mg/dL    Total Protein 7.4 6.4 - 8.3 g/dL    Albumin 3.5 3.5 - 5.2 g/dL    Total Bilirubin 0.4 0.0 - 1.2 mg/dL    Alkaline Phosphatase 110 40 - 129 U/L    ALT 23 0 - 40 U/L    AST 57 (H) 0 - 39 U/L   Magnesium   Result Value Ref Range    Magnesium 2.1 1.6 - 2.6 mg/dL   Troponin   Result Value Ref Range    Troponin, High Sensitivity 54 (H) 0 - 11 ng/L   Brain Natriuretic Peptide   Result Value Ref Range    Pro-BNP 7,283 (H) 0 - 450 pg/mL   Urinalysis, reflex to microscopic   Result Value Ref Range    Color, UA Yellow Straw/Yellow    Clarity, UA SL CLOUDY Clear    Glucose, Ur Negative Negative mg/dL    Bilirubin Urine SMALL (A) Negative    Ketones, Urine Negative Negative mg/dL    Specific Gravity, UA >=1.030 1.005 - 1.030    Blood, Urine MODERATE (A) Negative    pH, UA 5.5 5.0 - 9.0    Protein, UA 30 (A) Negative mg/dL    Urobilinogen, Urine 0.2 <2.0 E.U./dL    Nitrite, Urine Negative Negative    Leukocyte Esterase, Urine Negative Negative   EKG 12 Lead   Result Value Ref Range    Ventricular Rate 73 BPM    Atrial Rate 73 BPM    P-R Interval 210 ms    QRS Duration 70 ms    Q-T Interval 362 ms    QTc Calculation (Bazett) 398 ms    P Axis 71 degrees    R Axis 20 degrees    T Axis -57 degrees       RADIOLOGY:  XR CHEST (2 VW)   Final Result   Normal chest         US RETROPERITONEAL COMPLETE    (Results Pending)       EKG: This EKG is signed and interpreted by me. Rate: 73  Rhythm: Sinus  Interpretation: 1st degree AV block and nonspecific ST changes  Comparison: stable as compared to patient's most recent EKG      ------------------------- NURSING NOTES AND VITALS REVIEWED ---------------------------  Date / Time Roomed:  10/11/2021  7:11 PM  ED Bed Assignment:  32/32    The nursing notes within the ED encounter and vital signs as below have been reviewed. Patient Vitals for the past 24 hrs:   BP Temp Temp src Pulse Resp SpO2 Height Weight   10/12/21 0600 110/80 98.5 °F (36.9 °C) -- 75 22 96 % -- --   10/11/21 2336 122/85 -- -- 79 22 95 % -- --   10/11/21 2015 118/80 98.7 °F (37.1 °C) -- 74 18 95 % -- --   10/11/21 1039 108/82 98.5 °F (36.9 °C) Oral 79 16 96 % 5' 7\" (1.702 m) 280 lb (127 kg)   10/11/21 1017 -- -- -- 84 -- 92 % -- --       Oxygen Saturation Interpretation: Normal    ------------------------------------------ PROGRESS NOTES ------------------------------------------  Re-evaluation(s):  Time: 8:55 PM EDT  Patients symptoms show no change  Repeat physical examination is not changed    Counseling:  I have spoken with the patient and discussed todays results, in addition to providing specific details for the plan of care and counseling regarding the diagnosis and prognosis. Their questions are answered at this time and they are agreeable with the plan of admission.    --------------------------------- ADDITIONAL PROVIDER NOTES ---------------------------------  Consultations:  Time: 8:55 PM EDT. Spoke with Dr. Romero Chen. Discussed case. They will admit the patient.   This patient's ED course included: a personal history and physicial examination, re-evaluation prior to disposition, multiple bedside re-evaluations, IV medications, cardiac monitoring and continuous pulse oximetry    This patient has remained hemodynamically stable during their ED course. Diagnosis:  1. Acute kidney injury ([facility] [address]    2. Elevated troponin        Disposition:  Patient's disposition: Admit to telemetry  Patient's condition is fair.          Raymon Prince 44., DO  Resident  10/11/21 1316       Topher Asher DO  10/12/21 9190

## 2021-10-12 NOTE — CONSULTS
PULMONARY CONSULTATION    Reason for Consultation: shortness of breath and cough  Referring Physician: Jigar Samaniego MD    Communication with the referring physician will be sent via the electronic medical record. HPI:    The patient is an [de-identified]year old male with a history of diabetes, hyperlipidemia, prostate cancer s/p radiation, and chronic kidney disease who presented to the ED with increased weakness and fatigue. He actually fell at home. He also has been having a poor appetite for the last 5 days which is not typical for him. He was so weak that he started using a walker at home. He reports no current dyspnea but has some intermittent dyspnea lately. He reports a chronic cough but none today. He has no formal pulmonary history but did used to smoke cigarettes x 20 years but quit 30 years ago or so. Upon arrival his oxygen saturation has been stable on room air. Lab workup revealed acute on chronic kidney disease with creatinine 2.9 up from baseline of 1.5. he also was found to have an elevated D dimer of 2103. We did order a VQ which was low probability for pulmonary embolism. Lower extremity doppler studies were also negative. Troponin high sensitivity 50-->54-->165. Echocardiogram is pending. He has been placed on IVF for dehydration.       Past Medical History:   Diagnosis Date    Acute pancreatitis     Diabetes mellitus ([address]     History of snoring     HTN (hypertension)     Hyperlipidemia     Osteoarthritis     Preoperative clearance 8/29/2014    cardiac- Dr. Alita Goodpasture; note in epic for surgery 9/23/2014    Preoperative clearance 9-19-14    medical clearance for OR 9-23-14 from Dr. Soco Polk on paper chart    Prostate cancer [facility]) 1999    radiation, seed implant    Renal insufficiency        Past Surgical History:   Procedure Laterality Date    BELPHAROPTOSIS REPAIR Bilateral 01/23/2014    CATARACT REMOVAL WITH IMPLANT Right 10/2012    CATARACT REMOVAL WITH IMPLANT Left 11/2012  CHEST SURGERY  1985    benign lumpectomy    CHOLECYSTECTOMY      KNEE SURGERY Left     meniscus repair    KNEE SURGERY Right 1992    meniscus repair   [facility] SURGERY  2007    lower spine fx, disc repair, [place]. Dr. Sanya Pratt       Family History   Problem Relation Age of Onset    Heart Disease Father          age 72       Social History     Socioeconomic History    Marital status:      Spouse name: Not on file    Number of children: Not on file    Years of education: Not on file    Highest education level: Not on file   Occupational History    Not on file   Tobacco Use    Smoking status: Never Smoker    Smokeless tobacco: Never Used   Substance and Sexual Activity    Alcohol use: Yes     Comment: occasional    Drug use: No    Sexual activity: Not on file   Other Topics Concern    Not on file   Social History Narrative    Not on file     Social Determinants of Health     Financial Resource Strain:     Difficulty of Paying Living Expenses:    Food Insecurity:     Worried About Running Out of Food in the Last Year:     [address] in the Last Year:    Transportation Needs:     Lack of Transportation (Medical):      Lack of Transportation (Non-Medical):    Physical Activity:     Days of Exercise per Week:     Minutes of Exercise per Session:    Stress:     Feeling of Stress :    Social Connections:     Frequency of Communication with Friends and Family:     Frequency of Social Gatherings with Friends and Family:     Attends [employer] Services:     Active Member of Clubs or Organizations:     Attends Club or Organization Meetings:     Marital Status:    Intimate Partner Violence:     Fear of Current or Ex-Partner:     Emotionally Abused:     Physically Abused:     Sexually Abused:        Current Facility-Administered Medications   Medication Dose Route Frequency Provider Last Rate Last Admin    insulin lispro (HUMALOG) injection vial 0-12 Units  0-12 Units SubCUTAneous TID WC Andreas Vu MD        insulin lispro (HUMALOG) injection vial 0-6 Units  0-6 Units SubCUTAneous Nightly Andreas Vu MD        glucose (GLUTOSE) 40 % oral gel 15 g  15 g Oral PRN Andreas Vu MD        dextrose 50 % IV solution  12.5 g IntraVENous PRN Andreas Vu MD        glucagon (rDNA) injection 1 mg  1 mg IntraMUSCular PRN Andreas Vu MD        dextrose 5 % solution  100 mL/hr IntraVENous PRN Andreas Vu MD        perflutren lipid microspheres (DEFINITY) injection 1.65 mg  1.5 mL IntraVENous ONCE PRN Jennifer Rodriguez MD        [START ON 10/13/2021] heparin (porcine) injection 5,000 Units  5,000 Units SubCUTAneous Carlos Alberto Sears MD        sodium bicarbonate tablet 650 mg  650 mg Oral BID Parth Vicente MD        amLODIPine (NORVASC) tablet 10 mg  10 mg Oral Daily Andreas Vu MD   10 mg at 10/12/21 0856    rosuvastatin (CRESTOR) tablet 10 mg  10 mg Oral QPM Andreas Vu MD   10 mg at 10/11/21 2335    ondansetron (ZOFRAN-ODT) disintegrating tablet 4 mg  4 mg Oral Q8H PRN Andreas Vu MD        Or    ondansetron [facility]) injection 4 mg  4 mg IntraVENous Q6H PRN Andreas Vu MD        polyethylene glycol [facility]) packet 17 g  17 g Oral Daily PRN Andreas Vu MD        acetaminophen (TYLENOL) tablet 650 mg  650 mg Oral Q6H PRN Andreas Vu MD        Or    acetaminophen (TYLENOL) suppository 650 mg  650 mg Rectal Q6H PRN Andreas Vu MD        0.9 % sodium chloride infusion   IntraVENous Continuous Andreas Vu  mL/hr at 10/12/21 0947 New Bag at 10/12/21 0947     Current Outpatient Medications   Medication Sig Dispense Refill    oxybutynin (DITROPAN) 5 MG tablet Take 5 mg by mouth daily      atorvastatin (LIPITOR) 20 MG tablet Take 20 mg by mouth daily      losartan (COZAAR) 50 MG tablet Take 50 mg by mouth daily      fenofibrate (TRIGLIDE) 160 MG tablet Take 160 mg by mouth daily      tamsulosin (FLOMAX) 0.4 MG capsule Take 0.4 mg by mouth nightly 10/12/2021    LABGLOM 24 10/12/2021     Lab Results   Component Value Date    PROTIME 24.1 09/26/2014    INR 2.2 09/26/2014         Assessment:  1. Generalized weakness and fatigue  2. Elevated D dimer   3. Intermittent dyspnea on exertion  4. Acute on chronic kidney disease  5. Dehydration  6. Diabetes  7. Prostate cancer s/p radiation  8. Obesity    Plan:  Given his elevated D dimer we ordered a VQ and lower extremity dopplers for Mr. Margarita Peter, both of which were negative. His CXR is also normal.  I do not see evidence for acute pulmonary pathology. I would agree with IVF, echocardiogram, and ischemic evaluation prior to discharge. I would also agree with an outpatient sleep study. We will order this and follow along. Thank you for allowing me to participate in the care of Jada Nye.        Pablo Cox MD  10/12/2021 2:12 PM

## 2021-10-12 NOTE — CONSULTS
obesity (BMI 43.85)  9. H/o bilateral meniscus tears/repair, right total knee arthroplasty 2014, bilateral cataract surgery     Medications Prior to admit:  Prior to Admission medications    Medication Sig Start Date End Date Taking? Authorizing Provider   oxybutynin (DITROPAN) 5 MG tablet Take 5 mg by mouth daily   Yes Historical Provider, MD   atorvastatin (LIPITOR) 20 MG tablet Take 20 mg by mouth daily   Yes Historical Provider, MD   losartan (COZAAR) 50 MG tablet Take 50 mg by mouth daily   Yes Historical Provider, MD   fenofibrate (TRIGLIDE) 160 MG tablet Take 160 mg by mouth daily   Yes Historical Provider, MD   tamsulosin (FLOMAX) 0.4 MG capsule Take 0.4 mg by mouth nightly   Yes Historical Provider, MD   insulin detemir (LEVEMIR FLEXTOUCH) 100 UNIT/ML injection pen Inject 25 Units into the skin 2 times daily   Yes Historical Provider, MD   insulin aspart (NOVOLOG FLEXPEN) 100 UNIT/ML injection pen Inject 0-32 Units into the skin 3 times daily (before meals) *Per Sliding Scale*   Yes Historical Provider, MD   amLODIPine (NORVASC) 10 MG tablet Take 10 mg by mouth daily.  Instructed to take morning of surgery with a sip of water 7/31/14  Yes Historical Provider, MD       Current Medications:    Current Facility-Administered Medications: insulin lispro (HUMALOG) injection vial 0-12 Units, 0-12 Units, SubCUTAneous, TID WC  insulin lispro (HUMALOG) injection vial 0-6 Units, 0-6 Units, SubCUTAneous, Nightly  glucose (GLUTOSE) 40 % oral gel 15 g, 15 g, Oral, PRN  dextrose 50 % IV solution, 12.5 g, IntraVENous, PRN  glucagon (rDNA) injection 1 mg, 1 mg, IntraMUSCular, PRN  dextrose 5 % solution, 100 mL/hr, IntraVENous, PRN  perflutren lipid microspheres (DEFINITY) injection 1.65 mg, 1.5 mL, IntraVENous, ONCE PRN  amLODIPine (NORVASC) tablet 10 mg, 10 mg, Oral, Daily  rosuvastatin (CRESTOR) tablet 10 mg, 10 mg, Oral, QPM  enoxaparin (LOVENOX) injection 30 mg, 30 mg, SubCUTAneous, Daily  ondansetron (ZOFRAN-ODT) disintegrating tablet 4 mg, 4 mg, Oral, Q8H PRN **OR** ondansetron (ZOFRAN) injection 4 mg, 4 mg, IntraVENous, Q6H PRN  polyethylene glycol (GLYCOLAX) packet 17 g, 17 g, Oral, Daily PRN  acetaminophen (TYLENOL) tablet 650 mg, 650 mg, Oral, Q6H PRN **OR** acetaminophen (TYLENOL) suppository 650 mg, 650 mg, Rectal, Q6H PRN  0.9 % sodium chloride infusion, , IntraVENous, Continuous    Allergies:  Patient has no known allergies. Social History:  Lives with his wife. Completes ADL without CP, admits to LEONARD. Not very active secondary to pain - walks with walker/cane. Does not require supplemental O2. Denies tobacco, alcohol or illicit drug use    Full code     Family History: This information was not obtained at this time as it is found noncontributory secondary to the patients advanced age. REVIEW OF SYSTEMS:     · Constitutional:+ fevers, chills, night sweats, and fatigue  · HEENT: Denies headaches, nose bleeds, and blurred vision,oral pain, abscess or lesion. · Musculoskeletal: Denies falls, pain to BLE with ambulation and edema to BLE. · Neurological: Denies dizziness and lightheadedness, numbness and tingling  · Cardiovascular: Denies chest pain, palpitations, and feelings of heart racing. · Respiratory: Denies orthopnea and PND, +cough, LEONARD  · Gastrointestinal: Denies heartburn, nausea/vomiting, diarrhea and constipation, black/bloody, and tarry stools. · Genitourinary: Denies dysuria and hematuria  · Hematologic: Denies excessive bruising or bleeding  · Lymphatic: Denies lumps and bumps to neck, axilla, breast, and groin      PHYSICAL EXAM:   /63   Pulse 100   Temp 98.9 °F (37.2 °C) (Oral)   Resp 20   Ht 5' 7\" (1.702 m)   Wt 280 lb (127 kg)   SpO2 95%   BMI 43.85 kg/m²   CONST:  Well developed, morbidly obese  male who appears his stated age.  Awake, alert, cooperative, no apparent distress  HEENT:   Head- Normocephalic, atraumatic   Eyes- Conjunctivae pink, anicteric  Throat- Oral mucosa pink and moist  Neck-  No stridor, trachea midline, no jugular venous distention. CHEST: Chest symmetrical and non-tender to palpation. RESPIRATORY: Lung sounds clear throughout fields bilaterally. No wheeze or rhonchi noted. CARDIOVASCULAR:     No carotid bruit noted bilaterally   Heart Ausculation- Regular rate and rhythm, no murmur  PV: No lower extremity edema. No varicosities. ABDOMEN: Soft, non-tender to light palpation. Bowel sounds present. MS: Good muscle strength and tone. No atrophy or abnormal movements. : Deferred   SKIN: Warm and dry no statis dermatitis or ulcers   NEURO / PSYCH: Oriented to person, place and time. Speech clear and appropriate. Follows all commands. Pleasant affect     DATA:    ECG: SR HR 73. First degree AV block . Tele strips:  SR HR 99, PACs     Diagnostic:      Labs:   CBC:   Recent Labs     10/11/21  1724 10/12/21  0855   WBC 10.3 9.8   HGB 13.4 13.3   HCT 42.1 41.6    199     BMP:   Recent Labs     10/11/21  1724 10/12/21  0855   * 132   K 4.6 4.6   CO2 17* 17*   BUN 31* 34*   CREATININE 2.9* 2.6*   LABGLOM 21 24   CALCIUM 9.7 9.5     Mag:   Recent Labs     10/11/21  1724   MG 2.1     HgA1c:   Lab Results   Component Value Date    LABA1C 7.7 (H) 09/24/2014     LIVER PROFILE:  Recent Labs     10/11/21  1724 10/12/21  0855   AST 57* 89*   ALT 23 29   LABALBU 3.5 3.4*       Assessment:   1. Elevated troponin, in the setting of SAURABH. No ischemic EKG changes. Patient denies CP   2. Elevated proBNP, unknown EF. Patient appears euvolemic. 3. SAURABH on CKD with baseline Scr 1.5 (2014). Nephrology consulted. Retroperitoneal US ordered. 4. Recent UTI s/p antibiotic use   5. Hyponatremia, resolved. 6. Elevated AST   7. Hypoalbuminemia   8. Hypertension, controlled   9. Hyperlipidemia, on statin   10. Type 2 diabetes mellitus (A1c 7.7 2014). Insulin requiring   11. Obesity   12. Suspected OMARI   13.  Osteoarthritis  - takes NSAIDS 200mg q8h      Plan:   1. Echocardiogram to assess LV function   2. Trial Lasix 20mg IV BID if ok with nephrology. Monitor BMP, I&O   3. Ischemic evaluation prior to discharge Lexiscan vs Cath pending results of echocardiogram and after kidney function improvement. 4. Recommend outpatient sleep study   5. Check A1c and lipid panel   6. Aggressive risk factor modification including diet, exercise and weight loss discussed with the patient   7. Will follow     Above discussed with Dr Vernon Schneider   Electronically signed by Burton Frederick on 10/12/2021 at 10:01 AM     Patient seen and examined case discussed in detail with cardiology nurse practitioner and agree with assessment and plan and history and physical examination as discussed in detail and documented above.

## 2021-10-12 NOTE — H&P
[address]                  [address] [facility]  [facility]                              HISTORY AND PHYSICAL    PATIENT NAME: Maria Fernanda Hobbs                     :        1940  MED REC NO:   [id number]                            ROOM:       28  ACCOUNT NO:   [de-identified]                           ADMIT DATE: 10/11/2021  PROVIDER:     Ernst Saint, MD    CHIEF COMPLAINT:  Increasing fatigue, cough, shortness of breath, and  worsening renal function. HISTORY OF PRESENTING ILLNESS:  This is an 81-year-old with a history of  hypertension, hyperlipidemia, insulin-dependent diabetes mellitus, and  chronic kidney disease who presents to the emergency room with  increasing fatigue, with some cough, shortness of breath, and dry  heaving with poor oral intake. The patient was found to have a  creatinine of 2.9, which is above his baseline, which is about 1.6. The  patient complained of dry cough with dry heaving, which started several  days ago. The patient was diagnosed with a UTI, 100,000 colonies of E  coli approximately 1 week ago, was treated with Bactrim, which he states  worked and he was feeling better from that, but then about several days  ago, started developing dry heaves and this fatigue with increased oral  intake. He has been checked for COVID with a rapid and a PCR, both had  been negative. BNP is elevated. Troponins are somewhat elevated also. Denies any chest pain unless he coughs and he describes it as a burning  sensation. Also describes decreased urination. RECENT PRESENT MEDICATION:  See med rec in the chart. PAST MEDICAL HISTORY:  As described above. PAST SURGICAL HISTORY:  Includes knee surgery, cholecystectomy, back  surgery, and cataract removal.    SOCIAL HISTORY:  Drinks socially. Does not smoke. FAMILY MEDICAL HISTORY:  Noncontributory. ALLERGIES:  None known. REVIEW OF SYSTEMS: SKIN/LYMPHATICS:  Negative. CENTRAL NERVOUS SYSTEM:   Denies headache, dizziness, or blurred vision. CIRCULATORY:  Denies any  exertional chest pain. Does complain of some pain in his chest when he  coughs. Denies orthopnea or PND. DIGESTIVE:  Denies vomiting or  diarrhea. Does complain of nausea and dry heaving. Denies any  abdominal pain. GENITOURINARY:  Decreased urinary output. Recent UTIs  described above, but denies dysuria, frequency, or hematuria. MUSCULOSKELETAL:  He has chronic musculoskeletal pain due to  osteoarthritis. PHYSICAL EXAMINATION:  GENERAL:  He is seen in ultrasound. He is sitting up. Does look  somewhat short of breath, but in no distress. VITAL SIGNS:  Stable. SKIN:  Shows no pallor or jaundice. EYES:  Reveal no icterus. NECK:  Supple without bruits or masses. CHEST:  Anteriorly sounds okay. Aeration is normal.  No wheezing,  rhonchi, or rales. HEART:  Distant, but regular rate and rhythm. ABDOMEN:  Obese, soft, and nontender at this time. EXTREMITIES:  Reveal maybe minimal edema. No calf tenderness at this  point. NEUROLOGIC:  He is alert and oriented x3. There is no focal deficits at  this point. LABORATORY DATA:  None this morning. Yesterday, his sodium 129, BUN 31,  and creatinine 2.9. Troponin yesterday was 50, then repeat was 54 with  a BNP of 7283. White count was normal.  Hemoglobin was 13. Chest x-ray  was read as normal.  Respiratory viral panel was basically negative  including COVID. Urinalysis did show some bilirubin, a little bit of  blood, 10-20 RBCs, and few bacteria. EKG was nonspecific. DIAGNOSTIC IMPRESSION:  Obviously renal function is appeared at his  baseline. Hopefully due to poor oral intake, he did have a previous  UTI, which seemed to have resolved. The biggest complaint now is dry  heaving and cough and he does appear to be short of breath. PLAN:  We will check another troponin.   Cardiology consult along with  Pulmonary due to his symptoms as above. We will start gentle hydration  and follow his renal function and also getting an ultrasound to rule out  obstruction. Discharge plan will be home when stable.         Leda Hart MD    D: 10/12/2021 8:09:15       T: 10/12/2021 8:11:49     [email or web address]  Job#: [id number]     Doc#: [id number]    CC:

## 2021-10-13 ENCOUNTER — APPOINTMENT (OUTPATIENT)
Dept: GENERAL RADIOLOGY | Age: 81
DRG: 287 | End: 2021-10-13
Payer: MEDICARE

## 2021-10-13 LAB
ANION GAP SERPL CALCULATED.3IONS-SCNC: 13 MMOL/L (ref 7–16)
B.E.: -5 MMOL/L (ref -3–3)
BUN BLDV-MCNC: 35 MG/DL (ref 6–23)
CALCIUM SERPL-MCNC: 9.1 MG/DL (ref 8.6–10.2)
CHLORIDE BLD-SCNC: 104 MMOL/L (ref 98–107)
CHOLESTEROL, TOTAL: 97 MG/DL (ref 0–199)
CO2: 15 MMOL/L (ref 22–29)
COHB: 0.3 % (ref 0–1.5)
CREAT SERPL-MCNC: 2 MG/DL (ref 0.7–1.2)
CREATININE URINE: 249 MG/DL (ref 40–278)
CRITICAL: ABNORMAL
DATE ANALYZED: ABNORMAL
DATE OF COLLECTION: ABNORMAL
GFR AFRICAN AMERICAN: 39
GFR NON-AFRICAN AMERICAN: 32 ML/MIN/1.73
GLUCOSE BLD-MCNC: 125 MG/DL (ref 74–99)
HCO3: 18.3 MMOL/L (ref 22–26)
HDLC SERPL-MCNC: 11 MG/DL
HHB: 4.9 % (ref 0–5)
LAB: ABNORMAL
LDL CHOLESTEROL CALCULATED: 30 MG/DL (ref 0–99)
Lab: ABNORMAL
METER GLUCOSE: 116 MG/DL (ref 74–99)
METER GLUCOSE: 139 MG/DL (ref 74–99)
METER GLUCOSE: 171 MG/DL (ref 74–99)
METER GLUCOSE: 180 MG/DL (ref 74–99)
METHB: 0.2 % (ref 0–1.5)
MODE: ABNORMAL
O2 CONTENT: 17.7 ML/DL
O2 SATURATION: 95.1 % (ref 92–98.5)
O2HB: 94.6 % (ref 94–97)
OPERATOR ID: 659
OSMOLALITY URINE: 666 MOSM/KG (ref 300–900)
PATIENT TEMP: 37 C
PCO2: 29.6 MMHG (ref 35–45)
PH BLOOD GAS: 7.41 (ref 7.35–7.45)
PO2: 75.6 MMHG (ref 75–100)
POTASSIUM SERPL-SCNC: 4.3 MMOL/L (ref 3.5–5)
PRO-BNP: ABNORMAL PG/ML (ref 0–450)
PROTEIN PROTEIN: 37 MG/DL (ref 0–12)
SODIUM BLD-SCNC: 132 MMOL/L (ref 132–146)
SODIUM URINE: 32 MMOL/L
SOURCE, BLOOD GAS: ABNORMAL
THB: 13.3 G/DL (ref 11.5–16.5)
TIME ANALYZED: 1139
TRIGL SERPL-MCNC: 281 MG/DL (ref 0–149)
VLDLC SERPL CALC-MCNC: 56 MG/DL

## 2021-10-13 PROCEDURE — 71045 X-RAY EXAM CHEST 1 VIEW: CPT

## 2021-10-13 PROCEDURE — 2500000003 HC RX 250 WO HCPCS: Performed by: INTERNAL MEDICINE

## 2021-10-13 PROCEDURE — 97161 PT EVAL LOW COMPLEX 20 MIN: CPT

## 2021-10-13 PROCEDURE — 83935 ASSAY OF URINE OSMOLALITY: CPT

## 2021-10-13 PROCEDURE — 6370000000 HC RX 637 (ALT 250 FOR IP): Performed by: INTERNAL MEDICINE

## 2021-10-13 PROCEDURE — 82570 ASSAY OF URINE CREATININE: CPT

## 2021-10-13 PROCEDURE — 36415 COLL VENOUS BLD VENIPUNCTURE: CPT

## 2021-10-13 PROCEDURE — 2060000000 HC ICU INTERMEDIATE R&B

## 2021-10-13 PROCEDURE — 80048 BASIC METABOLIC PNL TOTAL CA: CPT

## 2021-10-13 PROCEDURE — 6360000002 HC RX W HCPCS: Performed by: INTERNAL MEDICINE

## 2021-10-13 PROCEDURE — 84156 ASSAY OF PROTEIN URINE: CPT

## 2021-10-13 PROCEDURE — 51798 US URINE CAPACITY MEASURE: CPT

## 2021-10-13 PROCEDURE — 36600 WITHDRAWAL OF ARTERIAL BLOOD: CPT

## 2021-10-13 PROCEDURE — 87493 C DIFF AMPLIFIED PROBE: CPT

## 2021-10-13 PROCEDURE — 87449 NOS EACH ORGANISM AG IA: CPT

## 2021-10-13 PROCEDURE — 84300 ASSAY OF URINE SODIUM: CPT

## 2021-10-13 PROCEDURE — 87324 CLOSTRIDIUM AG IA: CPT

## 2021-10-13 PROCEDURE — 6370000000 HC RX 637 (ALT 250 FOR IP): Performed by: FAMILY MEDICINE

## 2021-10-13 PROCEDURE — 74018 RADEX ABDOMEN 1 VIEW: CPT

## 2021-10-13 PROCEDURE — 99233 SBSQ HOSP IP/OBS HIGH 50: CPT | Performed by: INTERNAL MEDICINE

## 2021-10-13 PROCEDURE — 2580000003 HC RX 258: Performed by: INTERNAL MEDICINE

## 2021-10-13 PROCEDURE — 83880 ASSAY OF NATRIURETIC PEPTIDE: CPT

## 2021-10-13 PROCEDURE — 97530 THERAPEUTIC ACTIVITIES: CPT

## 2021-10-13 PROCEDURE — 80061 LIPID PANEL: CPT

## 2021-10-13 PROCEDURE — 94640 AIRWAY INHALATION TREATMENT: CPT

## 2021-10-13 PROCEDURE — 94664 DEMO&/EVAL PT USE INHALER: CPT

## 2021-10-13 PROCEDURE — 82805 BLOOD GASES W/O2 SATURATION: CPT

## 2021-10-13 PROCEDURE — 82962 GLUCOSE BLOOD TEST: CPT

## 2021-10-13 RX ORDER — ALBUTEROL SULFATE 2.5 MG/3ML
2.5 SOLUTION RESPIRATORY (INHALATION) 4 TIMES DAILY
Status: DISCONTINUED | OUTPATIENT
Start: 2021-10-13 | End: 2021-10-15

## 2021-10-13 RX ORDER — LACTOBACILLUS RHAMNOSUS GG 10B CELL
1 CAPSULE ORAL DAILY
Status: DISCONTINUED | OUTPATIENT
Start: 2021-10-13 | End: 2021-10-25 | Stop reason: HOSPADM

## 2021-10-13 RX ORDER — SODIUM CHLORIDE FOR INHALATION 0.9 %
3 VIAL, NEBULIZER (ML) INHALATION EVERY 4 HOURS PRN
Status: DISCONTINUED | OUTPATIENT
Start: 2021-10-13 | End: 2021-10-25 | Stop reason: HOSPADM

## 2021-10-13 RX ORDER — BUDESONIDE 0.25 MG/2ML
500 INHALANT ORAL 2 TIMES DAILY
Status: DISCONTINUED | OUTPATIENT
Start: 2021-10-13 | End: 2021-10-15

## 2021-10-13 RX ORDER — BENZONATATE 100 MG/1
200 CAPSULE ORAL 3 TIMES DAILY
Status: DISCONTINUED | OUTPATIENT
Start: 2021-10-13 | End: 2021-10-20

## 2021-10-13 RX ADMIN — HEPARIN SODIUM 5000 UNITS: 10000 INJECTION INTRAVENOUS; SUBCUTANEOUS at 15:26

## 2021-10-13 RX ADMIN — AMLODIPINE BESYLATE 10 MG: 10 TABLET ORAL at 08:30

## 2021-10-13 RX ADMIN — INSULIN LISPRO 2 UNITS: 100 INJECTION, SOLUTION INTRAVENOUS; SUBCUTANEOUS at 18:03

## 2021-10-13 RX ADMIN — Medication 1 CAPSULE: at 12:52

## 2021-10-13 RX ADMIN — BUDESONIDE 500 MCG: 0.25 SUSPENSION RESPIRATORY (INHALATION) at 13:26

## 2021-10-13 RX ADMIN — BENZONATATE 200 MG: 100 CAPSULE ORAL at 15:26

## 2021-10-13 RX ADMIN — HEPARIN SODIUM 5000 UNITS: 10000 INJECTION INTRAVENOUS; SUBCUTANEOUS at 06:20

## 2021-10-13 RX ADMIN — ROSUVASTATIN 10 MG: 10 TABLET, FILM COATED ORAL at 18:04

## 2021-10-13 RX ADMIN — SODIUM BICARBONATE 650 MG: 650 TABLET ORAL at 08:30

## 2021-10-13 RX ADMIN — SODIUM BICARBONATE: 84 INJECTION, SOLUTION INTRAVENOUS at 17:32

## 2021-10-13 RX ADMIN — INSULIN LISPRO 1 UNITS: 100 INJECTION, SOLUTION INTRAVENOUS; SUBCUTANEOUS at 20:18

## 2021-10-13 RX ADMIN — ALBUTEROL SULFATE 2.5 MG: 2.5 SOLUTION RESPIRATORY (INHALATION) at 17:20

## 2021-10-13 RX ADMIN — BENZONATATE 200 MG: 100 CAPSULE ORAL at 20:19

## 2021-10-13 RX ADMIN — SODIUM BICARBONATE: 84 INJECTION, SOLUTION INTRAVENOUS at 11:17

## 2021-10-13 RX ADMIN — ALBUTEROL SULFATE 2.5 MG: 2.5 SOLUTION RESPIRATORY (INHALATION) at 21:09

## 2021-10-13 RX ADMIN — ALBUTEROL SULFATE 2.5 MG: 2.5 SOLUTION RESPIRATORY (INHALATION) at 13:25

## 2021-10-13 RX ADMIN — BUDESONIDE 500 MCG: 0.25 SUSPENSION RESPIRATORY (INHALATION) at 21:10

## 2021-10-13 ASSESSMENT — PAIN SCALES - GENERAL
PAINLEVEL_OUTOF10: 0

## 2021-10-13 NOTE — CARE COORDINATION
Met with the patient and his son,  Leonel Reed at the bedside to discuss transition of care planning. Patient lives with his wife Maggie Lainez in a one story condo with a level entry. Patient has a [employer] and a BSC at home. Patient has a history of JERAMY and Bg Giovanni and Amita Miller previously too. Patient's PCP is Dr Romero Chen and he uses [facility] in [place] for his medications. Transition of care plan at this time is to return home. Patient and family agreeable to Amita 79 if needed. PT/OT to see the patient. Will continue to follow.      Sundar Rutherford RN.  [facility]  [phone]

## 2021-10-13 NOTE — PROGRESS NOTES
Department of Internal Medicine  Nephrology Attending Progress Note        SUBJECTIVE:  Mr Sally Miranda resting in bed having some cough and wheezing. Some improvement in creatinine with IV hydration but BUN worsening, patient states  stool quite hard. PMH  Acute kidney injury presumed prerenal  CKD 3 A  Prostate cancer status post temporary seed implants as well as external radiation, with development of frequency and decreased bladder volume  History of cholecystectomy with postoperative pancreatitis  Type 2 diabetes  Venous insufficiency using venous support stockings  Hypertension  Hyperlipidemia  Recent UTI treated with Bactrim  History of osteoarthritis status post bilateral knee surgery  Laminectomy  History of bilateral cataract extraction  Vitamin D deficiency    Physical Exam:    Vitals:    10/13/21 0746   BP: 121/65   Pulse: 68   Resp: 18   Temp: 97.2 °F (36.2 °C)   SpO2: 93%       I/O last 24 hours:  Intake/Output inaccurate    Weight: Bed scale reading 278 weight 285 at Dr. Johnson Ran office    General Appearance:  awake, alert, oriented, in no acute distress  Skin:venous insufficiency of lower legs  Neck:  neck- supple, no mass, non-tender and no bruits  Lungs: Distant breath sounds some scattered wheezing  Heart: Regular rhythm     Abdominal: Abdomen soft, non-tender.  BS normal. No masses,  No organomegaly  Extremities: Trace pitting edema but not wearing venous support stockings  Peripheral Pulses:  +2    DATA:    CBC with Differential:    Lab Results   Component Value Date    WBC 9.8 10/12/2021    RBC 4.99 10/12/2021    HGB 13.3 10/12/2021    HCT 41.6 10/12/2021     10/12/2021    MCV 83.4 10/12/2021    MCH 26.7 10/12/2021    MCHC 32.0 10/12/2021    RDW 14.1 10/12/2021    BANDSPCT 1 09/26/2014    LYMPHOPCT 13.0 10/12/2021    MONOPCT 8.0 10/12/2021    BASOPCT 0.6 10/12/2021    MONOSABS 0.78 10/12/2021    LYMPHSABS 1.28 10/12/2021    EOSABS 0.01 10/12/2021    BASOSABS 0.06 10/12/2021     BMP:    Lab Results   Component Value Date     10/13/2021    K 4.3 10/13/2021    K 4.6 10/12/2021     10/13/2021    CO2 15 10/13/2021    BUN 35 10/13/2021    LABALBU 3.4 10/12/2021    CREATININE 2.0 10/13/2021    CALCIUM 9.1 10/13/2021    GFRAA 39 10/13/2021    LABGLOM 32 10/13/2021    GLUCOSE 125 10/13/2021     The right kidney measures 10.6 x 6.5 x 5.4 cm and the left kidney measures   12.5 x 4.6 x 6 cm          lactobacillus  1 capsule Oral Daily    albuterol  2.5 mg Nebulization 4x daily    budesonide  500 mcg Nebulization BID    benzonatate  200 mg Oral TID    insulin lispro  0-12 Units SubCUTAneous TID WC    insulin lispro  0-6 Units SubCUTAneous Nightly    heparin (porcine)  5,000 Units SubCUTAneous Q8H    [Held by provider] sodium bicarbonate  650 mg Oral BID    influenza virus vaccine  0.5 mL IntraMUSCular Prior to discharge    amLODIPine  10 mg Oral Daily    rosuvastatin  10 mg Oral QPM      sodium bicarbonate infusion      dextrose       racepinephrine HCl, sodium chloride nebulizer, glucose, dextrose, glucagon (rDNA), dextrose, perflutren lipid microspheres, ondansetron **OR** ondansetron, polyethylene glycol, acetaminophen **OR** acetaminophen    IMPRESSION/RECOMMENDATIONS:      Acute kidney injury nonoliguric. Creatinine improving but BUN worsening. Urine chemistries and UA not sent as ordered  Metabolic acidosis changed to IV bicarbonate  Hypertension blood pressures okay off Cozaar  Type 2 diabetes screening for nephropathy, prior micro albumin creatinine ratio good at 25  Microscopic hematuria possibly related to recently treated UTI?   Will hold on further investigation for pulmonary renal syndrome as creatinine improving with IV hydration          Brian Warren MD  10/13/2021 3:14 PM

## 2021-10-13 NOTE — PROGRESS NOTES
Jeanne Tyler M.D.,[facility]  Rosa Love D.O., F.A.C.O.I., Xi Collins M.D. Valerie Singleton M.D. Phu Burdick D.O. Daily Pulmonary Progress Note    Patient:  Mony Herrmann [de-identified] y.o. male MRN: [id number]     Date of Service: 10/13/2021      Synopsis     We are following patient for shortness of breath and cough    \"CC\" : Shortness of breath and cough    Code status: Full code      Subjective      Patient was seen and examined. He is pretty tired and fatigued. Spent most of the night up in the bathroom. Has had 5-6 bouts of watery diarrhea. Still has audible upper airway wheezing. Has a cough as well. Review of Systems:  Constitutional: Positive for fatigue and weakness  Skin: Denies pigmentation, dark lesions, and rashes   HEENT: Denies hearing loss, tinnitus, ear drainage, epistaxis, sore throat, and hoarseness. Cardiovascular: Denies palpitations, chest pain, and chest pressure.   Respiratory: Positive for cough and wheezing  Gastrointestinal: Denies nausea, vomiting, poor appetite, diarrhea, heartburn or reflux  Genitourinary: Denies dysuria, frequency, urgency or hematuria  Musculoskeletal: Denies myalgias, muscle weakness, and bone pain  Neurological: Denies dizziness, vertigo, headache, and focal weakness  Psychological: Denies anxiety and depression  Endocrine: Denies heat intolerance and cold intolerance  Hematopoietic/Lymphatic: Denies bleeding problems and blood transfusions    24-hour events:      Objective   Vitals: /65   Pulse 68   Temp 97.2 °F (36.2 °C)   Resp 18   Ht 5' 7\" (1.702 m)   Wt 280 lb (127 kg)   SpO2 93%   BMI 43.85 kg/m²     I/O:    Intake/Output Summary (Last 24 hours) at 10/13/2021 1347  Last data filed at 10/13/2021 0555  Gross per 24 hour   Intake 1260 ml   Output --   Net 1260 ml       Vent Information  SpO2: 93 %                CURRENT MEDS :  Scheduled Meds:   lactobacillus  1 capsule Oral Daily    albuterol  2.5 mg Nebulization 4x daily    budesonide  500 mcg Nebulization BID    benzonatate  200 mg Oral TID    insulin lispro  0-12 Units SubCUTAneous TID WC    insulin lispro  0-6 Units SubCUTAneous Nightly    heparin (porcine)  5,000 Units SubCUTAneous Q8H    [Held by provider] sodium bicarbonate  650 mg Oral BID    influenza virus vaccine  0.5 mL IntraMUSCular Prior to discharge    amLODIPine  10 mg Oral Daily    rosuvastatin  10 mg Oral QPM       Physical Exam:  General Appearance: appears ill in no acute distress  HEENT: Normocephalic atraumatic without obvious abnormality   Neck: Lips, mucosa, and tongue normal.  Supple, symmetrical, trachea midline, no adenopathy;thyroid:  no enlargement/tenderness/nodules or JVD. Lung: Upper airway wheezing  Heart: RRR, normal S1, S2. No MRG  Abdomen: Soft, NT, ND. BS present x 4 quadrants. No bruit or organomegaly. Extremities: Pedal pulses 2+ symmetric b/l. Extremities normal, no cyanosis, clubbing, or edema. Musculokeletal: No joint swelling, no muscle tenderness. ROM normal in all joints of extremities. Neurologic: Mental status: Alert and Oriented X3 .     Pertinent/ New Labs and Imaging Studies     Imaging Personally Reviewed:  Chest x-ray ordered for today    ECHO  Ordered pending    Labs:  Lab Results   Component Value Date    WBC 9.8 10/12/2021    HGB 13.3 10/12/2021    HCT 41.6 10/12/2021    MCV 83.4 10/12/2021    MCH 26.7 10/12/2021    MCHC 32.0 10/12/2021    RDW 14.1 10/12/2021     10/12/2021    MPV 12.3 10/12/2021     Lab Results   Component Value Date     10/13/2021    K 4.3 10/13/2021    K 4.6 10/12/2021     10/13/2021    CO2 15 10/13/2021    BUN 35 10/13/2021    CREATININE 2.0 10/13/2021    LABALBU 3.4 10/12/2021    CALCIUM 9.1 10/13/2021    GFRAA 39 10/13/2021    LABGLOM 32 10/13/2021     Lab Results   Component Value Date    PROTIME 24.1 09/26/2014    INR 2.2 09/26/2014     Recent Labs     10/13/21  0730   PROBNP 11,016*     No results for input(s): PROCAL in the last 72 hours. This SmartLink has not been configured with any valid records. Micro:  No results for input(s): CULTRESP in the last 72 hours. No results for input(s): LABGRAM in the last 72 hours. No results for input(s): LEGUR in the last 72 hours. No results for input(s): STREPNEUMAGU in the last 72 hours. No results for input(s): LP1UAG in the last 72 hours. Assessment:    1. Upper airway wheezing and cough possibly cardiac wheezing versus viral infection  2. Elevated proBNP most likely consistent with CHF  3. SAURABH  4. Diarrhea  5. Recent UTI completed course of Bactrim  6. Probable OMARI  7. [place] metabolic acidosis      Plan:   1. Given Mr. Kyle Lainez significant upper airway wheezing and cough we will start treating him symptomatically with bronchodilators. Tessalon for his cough. 2. Repeat pro BNP today. Awaiting echocardiogram  3. We will order an ABG in meanwhile considering his worsening acidosis will start him on a bicarb drip. Will discuss with nephrology. Monitor his I's and O's very closely  4. Given the fact that he has had 6 bouts of watery diarrhea since last night we will check a C. difficile especially that he was recently on Bactrim.   5. We will also order a bedside 4-channel sleep study for evaluation of his sleep apnea        Electronically signed by eHnok Ramirez MD on 10/13/2021 at 1:47 PM

## 2021-10-13 NOTE — PROGRESS NOTES
INPATIENT CARDIOLOGY Follow UP    Name: Griffin Min    Age: [de-identified] y.o. Date of Admission: 10/11/2021  7:11 PM    Date of Service: 10/13/2021      Referring Physician: Mamta Donaldson MD      Subjective: Laying flat and denies orthopnea but report wheezing. Also report diarrhea          Past Medical History:  Past Medical History:   Diagnosis Date    Acute pancreatitis     Diabetes mellitus ([address]     History of snoring     HTN (hypertension)     Hyperlipidemia     Osteoarthritis     Preoperative clearance 2014    cardiac- Dr. Odessa Duenas; note in epic for surgery 2014    Preoperative clearance 14    medical clearance for OR 14 from Dr. Matthieu Evans on paper chart    Prostate cancer [facility])     radiation, seed implant    Renal insufficiency        Past Surgical History:  Past Surgical History:   Procedure Laterality Date    BELPHAROPTOSIS REPAIR Bilateral 2014    CATARACT REMOVAL WITH IMPLANT Right 10/2012    CATARACT REMOVAL WITH IMPLANT Left 2012    CHEST SURGERY  1985    benign lumpectomy    CHOLECYSTECTOMY  1995    KNEE SURGERY Left     meniscus repair    KNEE SURGERY Right 1992    meniscus repair    SPINE SURGERY  2007    lower spine fx, disc repair, [place]  Dr. Cavazos Proper       Family History:  Family History   Problem Relation Age of Onset    Heart Disease Father          age 72       Social History:  Social History     Socioeconomic History    Marital status:      Spouse name: Not on file    Number of children: Not on file    Years of education: Not on file    Highest education level: Not on file   Occupational History    Not on file   Tobacco Use    Smoking status: Never Smoker    Smokeless tobacco: Never Used   Substance and Sexual Activity    Alcohol use: Yes     Comment: occasional    Drug use: No    Sexual activity: Not on file   Other Topics Concern    Not on file   Social History Narrative    Not on file     Social Determinants of Health     Financial Resource Strain:     Difficulty of Paying Living Expenses:    Food Insecurity:     Worried About Running Out of Food in the Last Year:     [address] in the Last Year:    Transportation Needs:     Lack of Transportation (Medical):  Lack of Transportation (Non-Medical):    Physical Activity:     Days of Exercise per Week:     Minutes of Exercise per Session:    Stress:     Feeling of Stress :    Social Connections:     Frequency of Communication with Friends and Family:     Frequency of Social Gatherings with Friends and Family:     Attends Holiness Services:     Active Member of Clubs or Organizations:     Attends Club or Organization Meetings:     Marital Status:    Intimate Partner Violence:     Fear of Current or Ex-Partner:     Emotionally Abused:     Physically Abused:     Sexually Abused: Allergies:  No Known Allergies    Home Medications:  Prior to Admission medications    Medication Sig Start Date End Date Taking? Authorizing Provider   oxybutynin (DITROPAN) 5 MG tablet Take 5 mg by mouth daily   Yes Historical Provider, MD   atorvastatin (LIPITOR) 20 MG tablet Take 20 mg by mouth daily   Yes Historical Provider, MD   losartan (COZAAR) 50 MG tablet Take 50 mg by mouth daily   Yes Historical Provider, MD   fenofibrate (TRIGLIDE) 160 MG tablet Take 160 mg by mouth daily   Yes Historical Provider, MD   tamsulosin (FLOMAX) 0.4 MG capsule Take 0.4 mg by mouth nightly   Yes Historical Provider, MD   insulin detemir (LEVEMIR FLEXTOUCH) 100 UNIT/ML injection pen Inject 25 Units into the skin 2 times daily   Yes Historical Provider, MD   insulin aspart (NOVOLOG FLEXPEN) 100 UNIT/ML injection pen Inject 0-32 Units into the skin 3 times daily (before meals) *Per Sliding Scale*   Yes Historical Provider, MD   amLODIPine (NORVASC) 10 MG tablet Take 10 mg by mouth daily.  Instructed to take morning of surgery with a sip of water 7/31/14  Yes Historical Provider, MD       Current Medications:  Current Facility-Administered Medications   Medication Dose Route Frequency Provider Last Rate Last Admin    lactobacillus (CULTURELLE) capsule 1 capsule  1 capsule Oral Daily Carolynne Sever, MD   1 capsule at 10/13/21 1252    racepinephrine HCl (VAPONEFPRIN) 2.25 % nebulizer solution NEBU 11.25 mg  11.25 mg Nebulization Q4H PRN Carolynne Sever, MD        sodium chloride nebulizer 0.9 % solution 3 mL  3 mL Nebulization Q4H PRN Carolynne Sever, MD        albuterol (PROVENTIL) nebulizer solution 2.5 mg  2.5 mg Nebulization 4x daily Carolynne Sever, MD   2.5 mg at 10/13/21 1720    budesonide (PULMICORT) nebulizer suspension 500 mcg  500 mcg Nebulization BID Carolynne Sever, MD   500 mcg at 10/13/21 1326    benzonatate (TESSALON) capsule 200 mg  200 mg Oral TID Carolynne Sever, MD   200 mg at 10/13/21 1526    sodium bicarbonate 50 mEq in dextrose 5 % 1,000 mL infusion   IntraVENous Continuous Carolynne Sever,  mL/hr at 10/13/21 1732 New Bag at 10/13/21 1732    insulin lispro (HUMALOG) injection vial 0-12 Units  0-12 Units SubCUTAneous TID WC Pat León MD   2 Units at 10/13/21 1803    insulin lispro (HUMALOG) injection vial 0-6 Units  0-6 Units SubCUTAneous Nightly Pat León MD        glucose (GLUTOSE) 40 % oral gel 15 g  15 g Oral PRN Pat León MD        dextrose 50 % IV solution  12.5 g IntraVENous PRN Pat León MD        glucagon (rDNA) injection 1 mg  1 mg IntraMUSCular PRN Pat León MD        dextrose 5 % solution  100 mL/hr IntraVENous PRN Pat León MD        perflutren lipid microspheres (DEFINITY) injection 1.65 mg  1.5 mL IntraVENous ONCE PRN Carolynne Sever, MD        heparin (porcine) injection 5,000 Units  5,000 Units SubCUTAneous Garrett Kaur MD   5,000 Units at 10/13/21 1526    influenza quadrivalent split vaccine (FLUZONE;FLUARIX;FLULAVAL;AFLURIA) injection 0.5 mL  0.5 mL IntraMUSCular Prior to discharge Sharmila Manriquez MD        amLODIPine (NORVASC) tablet 10 mg  10 mg Oral Daily Sharmila Manriquez MD   10 mg at 10/13/21 0830    rosuvastatin (CRESTOR) tablet 10 mg  10 mg Oral QPM Sharmila Manriquez MD   10 mg at 10/13/21 1804    ondansetron (ZOFRAN-ODT) disintegrating tablet 4 mg  4 mg Oral Q8H PRN Sharmila Manriquez MD        Or    ondansetron [facility] injection 4 mg  4 mg IntraVENous Q6H PRN Sharmila Manriquez MD        polyethylene glycol [facility]) packet 17 g  17 g Oral Daily PRN Sharmila Manriquez MD        acetaminophen (TYLENOL) tablet 650 mg  650 mg Oral Q6H PRN Sharmila Manriquez MD        Or    acetaminophen (TYLENOL) suppository 650 mg  650 mg Rectal Q6H PRN Sharmila Manriquez MD          sodium bicarbonate infusion 100 mL/hr at 10/13/21 1732    dextrose         Physical Exam:  BP (!) 153/67   Pulse 83   Temp 97.7 °F (36.5 °C) (Temporal)   Resp 18   Ht 5' 7\" (1.702 m)   Wt 280 lb (127 kg)   SpO2 96%   BMI 43.85 kg/m²   Wt Readings from Last 3 Encounters:   10/11/21 280 lb (127 kg)   09/18/14 265 lb (120.2 kg)   08/28/14 265 lb 8 oz (120.4 kg)       Appearance: Alert and oriented x3 not in acute distress.   Skin: Dry skin  Head: Atraumatic  Eyes: Intact extraocular muscles   ENMT: Mucous membranes are moist  Neck: Supple  Lungs: Wheezes are appreciated  Cardiac: Normal S1 and S2  Abdomen: Protuberant  Extremities: Intact range of motion  Neurologic: No focal neurological deficits  Peripheral Pulses: 2+ peripheral pulses    Intake/Output:    Intake/Output Summary (Last 24 hours) at 10/13/2021 1832  Last data filed at 10/13/2021 1814  Gross per 24 hour   Intake 1260 ml   Output 100 ml   Net 1160 ml     I/O this shift:  In: -   Out: 100 [Urine:100]    Laboratory Tests:  Recent Labs     10/11/21  1724 10/12/21  0855 10/13/21  0730   * 132 132   K 4.6 4.6 4.3   CL 98 102 104   CO2 17* 17* 15*   BUN 31* 34* 35*   CREATININE 2.9* 2.6* 2.0*   GLUCOSE 161* 149* 125*   CALCIUM 9.7 9.5 9.1     Lab Results   Component Value Date    MG 2.1 10/11/2021    MG 1.7 09/26/2014    MG 1.7 09/25/2014     Recent Labs     10/11/21  1724 10/12/21  0855   ALKPHOS 110 110   ALT 23 29   AST 57* 89*   PROT 7.4 7.4   BILITOT 0.4 0.4   LABALBU 3.5 3.4*     Recent Labs     10/11/21  1724 10/12/21  0855   WBC 10.3 9.8   RBC 5.11 4.99   HGB 13.4 13.3   HCT 42.1 41.6   MCV 82.4 83.4   MCH 26.2 26.7   MCHC 31.8* 32.0   RDW 13.9 14.1    199   MPV 12.5* 12.3*     No results found for: CKTOTAL, CKMB, CKMBINDEX, TROPONINI  Recent Labs     10/11/21  1724 10/11/21  2002 10/12/21  0855   TROPHS 50* 54* 165*     Lab Results   Component Value Date    INR 2.2 09/26/2014    INR 1.8 09/25/2014    INR 1.2 09/24/2014    PROTIME 24.1 (H) 09/26/2014    PROTIME 19.9 (H) 09/25/2014    PROTIME 12.4 (H) 09/24/2014     No results found for: TSHFT4, TSH  Lab Results   Component Value Date    LABA1C 6.4 (H) 10/12/2021    LABA1C 7.7 (H) 09/24/2014     No results found for: EAG  Lab Results   Component Value Date    CHOL 97 10/13/2021     Lab Results   Component Value Date    TRIG 281 (H) 10/13/2021     Lab Results   Component Value Date    HDL 11 10/13/2021     Lab Results   Component Value Date    LDLCALC 30 10/13/2021     Lab Results   Component Value Date    LABVLDL 56 10/13/2021     No results found for: [address]     10/11/21  2002 10/13/21  0730   PROBNP 7,283* 11,016*       Cardiac Tests:      Echocardiogram reviewed:     Stress test reviewed:      Cardiac catheterization reviewed:     CXR reviewed: The ASCVD Risk score (Jannette Roman et al., 2013) failed to calculate for the following reasons: The 2013 ASCVD risk score is only valid for ages 36 to 78    ASSESSMENT / PLAN:    1. Acute kidney injury ([address]  Likely from dehydration  Continue rehydration  Nephrology on board    2.  Elevated troponin  Currently denies chest pain  Monitor closely and once diarrhea and COPD improved, he will benefit from pharmacologic myocardial perfusion stress

## 2021-10-13 NOTE — PROGRESS NOTES
Physical Therapy  Physical Therapy Initial Assessment     Name: Shon Junior  : 1940  MRN: [id number]      Date of Service: 10/13/2021    Evaluating PT:  Lety Foote, PT, DPT [id number]     Room #:  1796/7398-P  Diagnosis:  Elevated troponin [R77.8]  Acute kidney injury ([address] [N17.9]  Reason for admission: fatigue, SOB  Precautions:  Falls  Procedure/Surgery:  None   Equipment Recommendations:  FWW     SUBJECTIVE:  Pt lives with wife in a 1 floor condo with no steps to enter. Pt ambulated with no AD vs occasional FWW PTA. OBJECTIVE:   Initial Evaluation  Date: 10/13 Treatment   Short Term/ Long Term   Goals   AM-PAC 6 Clicks 48/04     Was pt agreeable to Eval/treatment? Yes      Does pt have pain?  Denies      Bed Mobility  Rolling: NT  Supine to sit: NT  Sit to supine: NT  Scooting: NT  Independent    Transfers Sit to stand: SBA  Stand to sit: SBA  Stand pivot: NT  Independent    Ambulation    100 feet with [employer] supervision  And  10 ft with no AD [place]    >200 feet with AAD Mod I vs independent    Stair negotiation: ascended and descended  NT  NA    ROM BUE:  See OT eval   BLE:  WFL     Strength BUE:  See OT eval   BLE:  knee ext 5/5  Ankle DF 5/5  Increase by 1/3 MMT grade    Balance Sitting EOB:  independent  Dynamic Standing:  SBA  Sitting EOB:  indep  Dynamic Standing:  indep     -Pt is A & O x 3  -Sensation:  Pt denies numbness and tingling to extremities  -Edema:  unremarkable     Therapeutic Exercises:  Functional activity     Patient education  Pt educated on safety, sequencing of transfers, and role of PT    Patient response to education:   Pt verbalized understanding Pt demonstrated skill Pt requires further education in this area   Yes  Yes  Yes     ASSESSMENT:  Conditions Requiring Skilled Therapeutic Intervention:  []Decreased strength     []Decreased ROM  [x]Decreased functional mobility  [x]Decreased balance   [x]Decreased endurance   []Decreased posture  []Decreased sensation  []Decreased coordination   []Decreased vision  []Decreased safety awareness   []Increased pain       Comments:  Pt subjective: Reports mild SOB with activity. Otherwise no complaints. Pt received standing in bathroom and agreeable to PT session   Pt ambulated with and without device. Without, shows flexed posture and requires hanging on walls and surfaces for increased support. Provided [employer] which steadied pt and greatly improved safety. Ambulation completed in [place] with fair speed and balance. SOB noted with activity which was the limiting factor for pt. Spo2 monitored on room air maintained 96-97%. Pt with all needs met and call light in reach. Pt would benefit from continued PT POC to address functional deficits described above. Treatment:  Patient practiced and was instructed in the following treatment:     Patient education provided continuously throughout session for sequencing, safety maintenance, and improving any deficits found during the evaluation.  Transfer training - completed stand from bedside with cues for hand placement    Gait training - ambulation completed with and without device. PT facilitated correction of flexed posture and educated on concerns when not using device. Completed x2 bouts of ambulation. Pt's/ family goals   1. Return home    Patient and or family understand(s) diagnosis, prognosis, and plan of care. Yes     Prognosis is good for reaching above PT goals.     PHYSICAL THERAPY PLAN OF CARE:    PT POC is established based on physician order and patient diagnosis     Referring provider/PT Order:    10/13/21 0945  PT eval and treat     Aquiles Galindo MD     Diagnosis:  Elevated troponin [R77.8]  Acute kidney injury ([facility] [address] [N17.9]  Specific instructions for next treatment:  Progress transfers and ambulation distance    Current Treatment Recommendations:   [] Strengthening to improve independence with functional mobility   [] ROM to improve independence with functional mobility   [x] Balance Training to improve static/dynamic balance and to reduce fall risk  [x] Endurance Training to improve activity tolerance during functional mobility   [x] Transfer Training to improve safety and independence with all functional transfers   [x] Gait Training to improve gait mechanics, endurance and asses need for appropriate assistive device  [] Stair Training in preparation for safe discharge home and/or into the community   [] Positioning to prevent skin breakdown and contractures  [] Safety and Education Training   [] Patient/Caregiver Education   [] HEP  [] Other     PT long term treatment goals are located in above grid    Frequency of treatments: 2-5x/week x 1-2 weeks. Time in  1400  Time out  1420    Total Treatment Time  10 minutes     Evaluation Time includes thorough review of current medical information, gathering information on past medical history/social history and prior level of function, completion of standardized testing/informal observation of tasks, assessment of data and education on plan of care and goals.     CPT codes:  [x] Low Complexity PT evaluation 20120  [] Moderate Complexity PT evaluation 61402  [] High Complexity PT evaluation 42335  [] PT Re-evaluation 62512  [] Gait training 40120 - minutes  [] Manual therapy 42936 - minutes  [x] Therapeutic activities 36174 10 minutes  [] Therapeutic exercises 33986 - minutes  [] Neuromuscular reeducation 26236 - minutes     Kaya Murphy PT, DPT  [id number]

## 2021-10-14 PROBLEM — E87.20 METABOLIC ACIDOSIS: Status: ACTIVE | Noted: 2021-10-14

## 2021-10-14 PROBLEM — E87.1 HYPONATREMIA: Status: ACTIVE | Noted: 2021-10-14

## 2021-10-14 PROBLEM — R06.03 ACUTE RESPIRATORY DISTRESS: Status: ACTIVE | Noted: 2021-10-14

## 2021-10-14 LAB
ANION GAP SERPL CALCULATED.3IONS-SCNC: 14 MMOL/L (ref 7–16)
BUN BLDV-MCNC: 32 MG/DL (ref 6–23)
C DIFF TOXIN/ANTIGEN: NORMAL
C. DIFFICILE TOXIN MOLECULAR: ABNORMAL
CALCIUM SERPL-MCNC: 9.1 MG/DL (ref 8.6–10.2)
CHLORIDE BLD-SCNC: 100 MMOL/L (ref 98–107)
CO2: 19 MMOL/L (ref 22–29)
CREAT SERPL-MCNC: 1.6 MG/DL (ref 0.7–1.2)
GFR AFRICAN AMERICAN: 51
GFR NON-AFRICAN AMERICAN: 42 ML/MIN/1.73
GLUCOSE BLD-MCNC: 203 MG/DL (ref 74–99)
LV EF: 43 %
LVEF MODALITY: NORMAL
METER GLUCOSE: 163 MG/DL (ref 74–99)
METER GLUCOSE: 176 MG/DL (ref 74–99)
METER GLUCOSE: 191 MG/DL (ref 74–99)
METER GLUCOSE: 199 MG/DL (ref 74–99)
ORGANISM: ABNORMAL
POTASSIUM SERPL-SCNC: 3.7 MMOL/L (ref 3.5–5)
SODIUM BLD-SCNC: 133 MMOL/L (ref 132–146)
URINE CULTURE, ROUTINE: NORMAL

## 2021-10-14 PROCEDURE — 97530 THERAPEUTIC ACTIVITIES: CPT

## 2021-10-14 PROCEDURE — 97535 SELF CARE MNGMENT TRAINING: CPT

## 2021-10-14 PROCEDURE — 36415 COLL VENOUS BLD VENIPUNCTURE: CPT

## 2021-10-14 PROCEDURE — 2060000000 HC ICU INTERMEDIATE R&B

## 2021-10-14 PROCEDURE — 6360000002 HC RX W HCPCS: Performed by: INTERNAL MEDICINE

## 2021-10-14 PROCEDURE — 97165 OT EVAL LOW COMPLEX 30 MIN: CPT

## 2021-10-14 PROCEDURE — 80048 BASIC METABOLIC PNL TOTAL CA: CPT

## 2021-10-14 PROCEDURE — 99233 SBSQ HOSP IP/OBS HIGH 50: CPT | Performed by: INTERNAL MEDICINE

## 2021-10-14 PROCEDURE — 82962 GLUCOSE BLOOD TEST: CPT

## 2021-10-14 PROCEDURE — 6370000000 HC RX 637 (ALT 250 FOR IP): Performed by: INTERNAL MEDICINE

## 2021-10-14 PROCEDURE — 6370000000 HC RX 637 (ALT 250 FOR IP): Performed by: FAMILY MEDICINE

## 2021-10-14 PROCEDURE — 93306 TTE W/DOPPLER COMPLETE: CPT

## 2021-10-14 PROCEDURE — 94640 AIRWAY INHALATION TREATMENT: CPT

## 2021-10-14 PROCEDURE — 86738 MYCOPLASMA ANTIBODY: CPT

## 2021-10-14 RX ORDER — TEMAZEPAM 15 MG/1
15 CAPSULE ORAL NIGHTLY PRN
Status: DISCONTINUED | OUTPATIENT
Start: 2021-10-14 | End: 2021-10-25 | Stop reason: HOSPADM

## 2021-10-14 RX ADMIN — INSULIN LISPRO 2 UNITS: 100 INJECTION, SOLUTION INTRAVENOUS; SUBCUTANEOUS at 09:04

## 2021-10-14 RX ADMIN — BENZONATATE 200 MG: 100 CAPSULE ORAL at 20:08

## 2021-10-14 RX ADMIN — ALBUTEROL SULFATE 2.5 MG: 2.5 SOLUTION RESPIRATORY (INHALATION) at 21:57

## 2021-10-14 RX ADMIN — BUDESONIDE 500 MCG: 0.25 SUSPENSION RESPIRATORY (INHALATION) at 21:58

## 2021-10-14 RX ADMIN — ALBUTEROL SULFATE 2.5 MG: 2.5 SOLUTION RESPIRATORY (INHALATION) at 16:30

## 2021-10-14 RX ADMIN — INSULIN LISPRO 2 UNITS: 100 INJECTION, SOLUTION INTRAVENOUS; SUBCUTANEOUS at 18:54

## 2021-10-14 RX ADMIN — BENZONATATE 200 MG: 100 CAPSULE ORAL at 15:14

## 2021-10-14 RX ADMIN — FIDAXOMICIN 200 MG: 200 TABLET, FILM COATED ORAL at 22:04

## 2021-10-14 RX ADMIN — BUDESONIDE 500 MCG: 0.25 SUSPENSION RESPIRATORY (INHALATION) at 08:16

## 2021-10-14 RX ADMIN — ALBUTEROL SULFATE 2.5 MG: 2.5 SOLUTION RESPIRATORY (INHALATION) at 11:54

## 2021-10-14 RX ADMIN — HEPARIN SODIUM 5000 UNITS: 10000 INJECTION INTRAVENOUS; SUBCUTANEOUS at 15:06

## 2021-10-14 RX ADMIN — ROSUVASTATIN 10 MG: 10 TABLET, FILM COATED ORAL at 18:45

## 2021-10-14 RX ADMIN — HEPARIN SODIUM 5000 UNITS: 10000 INJECTION INTRAVENOUS; SUBCUTANEOUS at 20:08

## 2021-10-14 RX ADMIN — ALBUTEROL SULFATE 2.5 MG: 2.5 SOLUTION RESPIRATORY (INHALATION) at 08:15

## 2021-10-14 RX ADMIN — Medication 1 CAPSULE: at 10:28

## 2021-10-14 RX ADMIN — AMLODIPINE BESYLATE 10 MG: 10 TABLET ORAL at 09:03

## 2021-10-14 RX ADMIN — BENZONATATE 200 MG: 100 CAPSULE ORAL at 10:28

## 2021-10-14 RX ADMIN — INSULIN LISPRO 2 UNITS: 100 INJECTION, SOLUTION INTRAVENOUS; SUBCUTANEOUS at 12:42

## 2021-10-14 RX ADMIN — INSULIN LISPRO 1 UNITS: 100 INJECTION, SOLUTION INTRAVENOUS; SUBCUTANEOUS at 20:08

## 2021-10-14 ASSESSMENT — PAIN SCALES - GENERAL: PAINLEVEL_OUTOF10: 0

## 2021-10-14 NOTE — PROGRESS NOTES
INPATIENT CARDIOLOGY Follow UP    Name: Abisai Cordero    Age: [de-identified] y.o. Date of Admission: 10/11/2021  7:11 PM    Date of Service: 10/14/2021      Referring Physician: Mily Salguero MD      Subjective: Lying in bed comfortably. Report breathing is improving. Wheezes also improving        Past Medical History:  Past Medical History:   Diagnosis Date    Acute pancreatitis     Diabetes mellitus ([address]     History of snoring     HTN (hypertension)     Hyperlipidemia     Osteoarthritis     Preoperative clearance 2014    cardiac- Dr. Tova Arshad; note in epic for surgery 2014    Preoperative clearance 14    medical clearance for OR 14 from Dr. Marcos Nations on paper chart    Prostate cancer [facility])     radiation, seed implant    Renal insufficiency        Past Surgical History:  Past Surgical History:   Procedure Laterality Date    BELPHAROPTOSIS REPAIR Bilateral 2014    CATARACT REMOVAL WITH IMPLANT Right 10/2012    CATARACT REMOVAL WITH IMPLANT Left 2012    CHEST SURGERY  1985    benign lumpectomy    CHOLECYSTECTOMY  1995    KNEE SURGERY Left     meniscus repair    KNEE SURGERY Right 1992    meniscus repair    SPINE SURGERY  [facility]    lower spine fx, disc repair, [facility].  Dr. Juju Valente       Family History:  Family History   Problem Relation Age of Onset    Heart Disease Father          age 72       Social History:  Social History     Socioeconomic History    Marital status:      Spouse name: Not on file    Number of children: Not on file    Years of education: Not on file    Highest education level: Not on file   Occupational History    Not on file   Tobacco Use    Smoking status: Never Smoker    Smokeless tobacco: Never Used   Substance and Sexual Activity    Alcohol use: Yes     Comment: occasional    Drug use: No    Sexual activity: Not on file   Other Topics Concern    Not on file   Social History Narrative    Not on file Social Determinants of Health     Financial Resource Strain:     Difficulty of Paying Living Expenses:    Food Insecurity:     Worried About Running Out of Food in the Last Year:     [address] in the Last Year:    Transportation Needs:     Lack of Transportation (Medical):  Lack of Transportation (Non-Medical):    Physical Activity:     Days of Exercise per Week:     Minutes of Exercise per Session:    Stress:     Feeling of Stress :    Social Connections:     Frequency of Communication with Friends and Family:     Frequency of Social Gatherings with Friends and Family:     Attends Oriental orthodox Services:     Active Member of Clubs or Organizations:     Attends Club or Organization Meetings:     Marital Status:    Intimate Partner Violence:     Fear of Current or Ex-Partner:     Emotionally Abused:     Physically Abused:     Sexually Abused: Allergies:  No Known Allergies    Home Medications:  Prior to Admission medications    Medication Sig Start Date End Date Taking? Authorizing Provider   oxybutynin (DITROPAN) 5 MG tablet Take 5 mg by mouth daily   Yes Historical Provider, MD   atorvastatin (LIPITOR) 20 MG tablet Take 20 mg by mouth daily   Yes Historical Provider, MD   losartan (COZAAR) 50 MG tablet Take 50 mg by mouth daily   Yes Historical Provider, MD   fenofibrate (TRIGLIDE) 160 MG tablet Take 160 mg by mouth daily   Yes Historical Provider, MD   tamsulosin (FLOMAX) 0.4 MG capsule Take 0.4 mg by mouth nightly   Yes Historical Provider, MD   insulin detemir (LEVEMIR FLEXTOUCH) 100 UNIT/ML injection pen Inject 25 Units into the skin 2 times daily   Yes Historical Provider, MD   insulin aspart (NOVOLOG FLEXPEN) 100 UNIT/ML injection pen Inject 0-32 Units into the skin 3 times daily (before meals) *Per Sliding Scale*   Yes Historical Provider, MD   amLODIPine (NORVASC) 10 MG tablet Take 10 mg by mouth daily.  Instructed to take morning of surgery with a sip of water 7/31/14  Yes Historical Provider, MD       Current Medications:  Current Facility-Administered Medications   Medication Dose Route Frequency Provider Last Rate Last Admin    lactobacillus (CULTURELLE) capsule 1 capsule  1 capsule Oral Daily Yvette Parrish MD   1 capsule at 10/14/21 1028    racepinephrine HCl (VAPONEFPRIN) 2.25 % nebulizer solution NEBU 11.25 mg  11.25 mg Nebulization Q4H PRN Yvette Parrish MD        sodium chloride nebulizer 0.9 % solution 3 mL  3 mL Nebulization Q4H PRN Yvette Parrish MD        albuterol (PROVENTIL) nebulizer solution 2.5 mg  2.5 mg Nebulization 4x daily Yvette Parrish MD   2.5 mg at 10/14/21 1630    budesonide (PULMICORT) nebulizer suspension 500 mcg  500 mcg Nebulization BID Yvette Parrish MD   500 mcg at 10/14/21 0816    benzonatate (TESSALON) capsule 200 mg  200 mg Oral TID Yvette Parrish MD   200 mg at 10/14/21 1514    sodium bicarbonate 50 mEq in dextrose 5 % 1,000 mL infusion   IntraVENous Continuous Yvette Parrish  mL/hr at 10/13/21 1732 New Bag at 10/13/21 1732    insulin lispro (HUMALOG) injection vial 0-12 Units  0-12 Units SubCUTAneous TID WC Charly Shine MD   2 Units at 10/14/21 1854    insulin lispro (HUMALOG) injection vial 0-6 Units  0-6 Units SubCUTAneous Nightly Charly Shine MD   1 Units at 10/13/21 2018    glucose (GLUTOSE) 40 % oral gel 15 g  15 g Oral PRN Charly Shine MD        dextrose 50 % IV solution  12.5 g IntraVENous PRN Charly Shine MD        glucagon (rDNA) injection 1 mg  1 mg IntraMUSCular PRN Charly Shine MD        dextrose 5 % solution  100 mL/hr IntraVENous PRN Charly Shine MD        perflutren lipid microspheres (DEFINITY) injection 1.65 mg  1.5 mL IntraVENous ONCE PRN Yvette Parrish MD        heparin (porcine) injection 5,000 Units  5,000 Units SubCUTAneous Elvia Lopez MD   5,000 Units at 10/14/21 1506    influenza quadrivalent split vaccine (FLUZONE;FLUARIX;FLULAVAL;AFLURIA) injection 0.5 mL 0.5 mL IntraMUSCular Prior to discharge Mamta Donaldson MD        amLODIPine (NORVASC) tablet 10 mg  10 mg Oral Daily Mamta Donaldson MD   10 mg at 10/14/21 7491    rosuvastatin (CRESTOR) tablet 10 mg  10 mg Oral QPM Mamta Donaldson MD   10 mg at 10/14/21 1845    ondansetron (ZOFRAN-ODT) disintegrating tablet 4 mg  4 mg Oral Q8H PRN Mamta Donaldson MD        Or    [facility]dansetron [facility]) injection 4 mg  4 mg IntraVENous Q6H PRN Mamta Donaldson MD        polyethylene glycol [facility]) packet 17 g  17 g Oral Daily PRN Mamta Donaldson MD        acetaminophen (TYLENOL) tablet 650 mg  650 mg Oral Q6H PRN Mamta Donaldson MD        Or    acetaminophen (TYLENOL) suppository 650 mg  650 mg Rectal Q6H PRN Mamta Donaldson MD          sodium bicarbonate infusion 100 mL/hr at 10/13/21 1732    dextrose         Physical Exam:  BP (!) 152/79   Pulse 85   Temp 97.9 °F (36.6 °C) (Temporal)   Resp 18   Ht 5' 7\" (1.702 m)   Wt 280 lb (127 kg)   SpO2 99%   BMI 43.85 kg/m²   Wt Readings from Last 3 Encounters:   10/11/21 280 lb (127 kg)   09/18/14 265 lb (120.2 kg)   08/28/14 265 lb 8 oz (120.4 kg)       Appearance: Alert and oriented x3 not in acute distress. Skin: Dry skin  Head: Atraumatic  Eyes: Intact extraocular muscles   ENMT: Mucous membranes are moist  Neck: Supple  Lungs: Wheezes are are improving   Cardiac: Normal S1 and S2  Abdomen: Protuberant  Extremities: Intact range of motion  Neurologic: No focal neurological deficits  Peripheral Pulses: 2+ peripheral pulses    Intake/Output:    Intake/Output Summary (Last 24 hours) at 10/14/2021 1924  Last data filed at 10/14/2021 1027  Gross per 24 hour   Intake 1000 ml   Output 500 ml   Net 500 ml     No intake/output data recorded.     Laboratory Tests:  Recent Labs     10/12/21  0855 10/13/21  0730 10/14/21  0624    132 133   K 4.6 4.3 3.7    104 100   CO2 17* 15* 19*   BUN 34* 35* 32*   CREATININE 2.6* 2.0* 1.6*   GLUCOSE 149* 125* 203*   CALCIUM 9.5 9.1 9.1     Lab Results   Component Value Date    MG 2.1 10/11/2021    MG 1.7 09/26/2014    MG 1.7 09/25/2014     Recent Labs     10/12/21  0855   ALKPHOS 110   ALT 29   AST 89*   PROT 7.4   BILITOT 0.4   LABALBU 3.4*     Recent Labs     10/12/21  0855   WBC 9.8   RBC 4.99   HGB 13.3   HCT 41.6   MCV 83.4   MCH 26.7   MCHC 32.0   RDW 14.1      MPV 12.3*     No results found for: CKTOTAL, CKMB, CKMBINDEX, TROPONINI  Recent Labs     10/11/21  2002 10/12/21  0855   TROPHS 54* 165*     Lab Results   Component Value Date    INR 2.2 09/26/2014    INR 1.8 09/25/2014    INR 1.2 09/24/2014    PROTIME 24.1 (H) 09/26/2014    PROTIME 19.9 (H) 09/25/2014    PROTIME 12.4 (H) 09/24/2014     No results found for: TSHFT4, TSH  Lab Results   Component Value Date    LABA1C 6.4 (H) 10/12/2021    LABA1C 7.7 (H) 09/24/2014     No results found for: EAG  Lab Results   Component Value Date    CHOL 97 10/13/2021     Lab Results   Component Value Date    TRIG 281 (H) 10/13/2021     Lab Results   Component Value Date    HDL 11 10/13/2021     Lab Results   Component Value Date    LDLCALC 30 10/13/2021     Lab Results   Component Value Date    LABVLDL 56 10/13/2021     No results found for: [address]     10/11/21  2002 10/13/21  0730   PROBNP 7,283* 11,016*       Cardiac Tests:      Echocardiogram reviewed:     Stress test reviewed:      Cardiac catheterization reviewed:     CXR reviewed: The ASCVD Risk score (Andree Sevilla et al., 2013) failed to calculate for the following reasons: The 2013 ASCVD risk score is only valid for ages 36 to 78    ASSESSMENT / PLAN:    1. Acute kidney injury ([facility]  Likely from dehydration  Continue rehydration  Nephrology on board    2.  Elevated troponin  Currently denies chest pain  Monitor closely and once diarrhea and COPD improved, he will benefit from pharmacologic myocardial perfusion stress test.  Echocardiogram to guide therapy  Ischemic evaluation with Lexiscan pharmacologic myocardial perfusion stress test once COPD exacerbation has improved    3. Elevated BNP  Likely from acute kidney injury and COPD  Monitor closely  Awaiting echo to guide therapy    4. Diarrhea  Management per primary service    5. Hypertension    6. Diabetes    7. Obesity    8. Suspected sleep apnea  Pulmonology on board and appreciate your input    9. Osteoarthritis                Thank you for allowing me to participate in your patient's care. Please feel free to contact me if you have any questions or concerns.     Felicia Contreras MD  [facility]) Cardiology

## 2021-10-14 NOTE — PROGRESS NOTES
[facility]  Nephrology Attending Progress Note  Kan Preston. Nino Bansal MD        SUBJECTIVE:   10/14/21- awake and alert. Diarrhea improved. PROBLEM LIST:    Patient Active Problem List   Diagnosis    HTN (hypertension)    Diabetes ([facility]    Hyperlipidemia    Renal insufficiency    Osteoarthritis, knee    Acute kidney injury ([facility]        PAST MEDICAL HISTORY:    Past Medical History:   Diagnosis Date    Acute pancreatitis     Diabetes mellitus ([facility]     History of snoring     HTN (hypertension)     Hyperlipidemia     Osteoarthritis     Preoperative clearance 8/29/2014    cardiac- Dr. David Sanders; note in epic for surgery 9/23/2014    Preoperative clearance 9-19-14    medical clearance for OR 9-23-14 from Dr. Mitchell Dunaway on paper chart    Prostate cancer [facility]) 1999    radiation, seed implant    Renal insufficiency        DIET:    ADULT DIET; Regular     PHYSICAL EXAM:     Patient Vitals for the past 24 hrs:   BP Temp Temp src Pulse Resp SpO2   10/14/21 0903 (!) 152/79 -- -- -- -- --   10/14/21 0445 99/60 97.6 °F (36.4 °C) Temporal 83 23 99 %   10/13/21 1915 108/78 98 °F (36.7 °C) Temporal 77 19 95 %   10/13/21 1720 -- -- -- -- -- 96 %   10/13/21 1532 (!) 153/67 97.7 °F (36.5 °C) Temporal 83 18 93 %   @      Intake/Output Summary (Last 24 hours) at 10/14/2021 0906  Last data filed at 10/14/2021 0518  Gross per 24 hour   Intake 1000 ml   Output 400 ml   Net 600 ml         Wt Readings from Last 3 Encounters:   10/11/21 280 lb (127 kg)   09/18/14 265 lb (120.2 kg)   08/28/14 265 lb 8 oz (120.4 kg)       Constitutional:  Pt is in no acute distress  Head: normocephalic, atraumatic  Neck: no JVD  Cardiovascular: S1 s2 no S3 or rub  Respiratory:  No rales, rhochi, or wheezes  Gastrointestinal:  Soft, nontender, nondistended, bowel sounds x 4  Ext: no edema   Skin: dry, no rash  Neuro: awake and alert.     MEDS (scheduled):    lactobacillus  1 capsule Oral Daily    albuterol  2.5 mg Nebulization 4x daily    budesonide  500 mcg Nebulization BID    benzonatate  200 mg Oral TID    insulin lispro  0-12 Units SubCUTAneous TID     insulin lispro  0-6 Units SubCUTAneous Nightly    heparin (porcine)  5,000 Units SubCUTAneous Q8H    influenza virus vaccine  0.5 mL IntraMUSCular Prior to discharge    amLODIPine  10 mg Oral Daily    rosuvastatin  10 mg Oral QPM       MEDS (infusions):   sodium bicarbonate infusion 100 mL/hr at 10/13/21 1732    dextrose         MEDS (prn):  racepinephrine HCl, sodium chloride nebulizer, glucose, dextrose, glucagon (rDNA), dextrose, perflutren lipid microspheres, ondansetron **OR** ondansetron, polyethylene glycol, acetaminophen **OR** acetaminophen    DATA:    Recent Labs     10/11/21  1724 10/12/21  0855   WBC 10.3 9.8   HGB 13.4 13.3   HCT 42.1 41.6   MCV 82.4 83.4    199     Recent Labs     10/11/21  1724 10/11/21  1724 10/12/21  0855 10/13/21  0730 10/14/21  0624   *   < > 132 132 133   K 4.6  --  4.6 4.3 3.7   CL 98   < > 102 104 100   CO2 17*   < > 17* 15* 19*   BUN 31*   < > 34* 35* 32*   CREATININE 2.9*   < > 2.6* 2.0* 1.6*   LABGLOM 21   < > 24 32 42   GLUCOSE 161*   < > 149* 125* 203*   CALCIUM 9.7   < > 9.5 9.1 9.1   ALT 23  --  29  --   --    AST 57*  --  89*  --   --    BILITOT 0.4  --  0.4  --   --    ALKPHOS 110  --  110  --   --    MG 2.1  --   --   --   --     < > = values in this interval not displayed.        Lab Results   Component Value Date    LABALBU 3.4 (L) 10/12/2021    LABALBU 3.5 10/11/2021    LABALBU 3.5 09/25/2014    LABALBU 2.8 (L) 09/25/2014     No results found for: TSH    Iron Studies  Lab Results   Component Value Date    IRON 9 (L) 09/25/2014    TIBC 334 09/25/2014    FERRITIN 205 09/25/2014     Vitamin B-12   Date Value Ref Range Status   09/25/2014 225 211 - 946 pg/mL Final     Folate   Date Value Ref Range Status   09/25/2014 13.0 7.3 - 26.1 ng/mL Final       Vit D, 25-Hydroxy   Date Value Ref Range Status   09/25/2014 9 (L) 30 - 100 ng/mL Final     Comment:     <20 ng/mL. ........... Tom Settler Deficient  20-30 ng/mL. ......... Tom Settler Insufficient   ng/mL. ........ Tom Settler Sufficient  >100 ng/mL. .......... Tom Settler Toxic       PTH   Date Value Ref Range Status   09/25/2014 42 15 - 65 pg/mL Final       No components found for: URIC    Lab Results   Component Value Date    COLORU Yellow 10/12/2021    NITRU Negative 10/12/2021    GLUCOSEU Negative 10/12/2021    KETUA Negative 10/12/2021    UROBILINOGEN 0.2 10/12/2021    BILIRUBINUR SMALL 10/12/2021       No results found for: LIPIDPAN      IMPRESSION/RECOMMENDATIONS:         1. Hyponatremia  Na 129-->133     2. SAURABH  Baseline from 2014 of 1.5  Cr now 2.9>2.6.->2.0-->1.6 with ivf  No hydro on shirley  Check daily bmp  Holding cozaar  Sp bactrim last week     3. Shortness of breath-  Pulmonology following also     4. Hypertension-  Controlled at goal<130/80  Off  cozaar  On norvasc     5.  Metabolic acidosis  In setting of ckd  Improving on IVF with HCO3       CHOCO Troy - CNP     Pt seen and examined agree with above  Na improving  Cr improving as well with ivf  Remington Desai MD

## 2021-10-14 NOTE — PROGRESS NOTES
Subjective: The patient is awake and alert. Still coughing. Diarrhea better. Denies chest pain, angina, and dyspnea. Denies abdominal pain. Tolerating diet. No nausea or vomiting. Laying flat in bed without distress. Patient states he did not have diarrhea prior to admission. Objective:    BP 99/60   Pulse 83   Temp 97.6 °F (36.4 °C) (Temporal)   Resp 23   Ht 5' 7\" (1.702 m)   Wt 280 lb (127 kg)   SpO2 99%   BMI 43.85 kg/m²     Heart:  RRR, no murmurs, gallops, or rubs. Lungs:  CTA bilaterally, no wheeze, rales or rhonchi  Abd: bowel sounds present, nontender, nondistended, no masses  Extrem:  No clubbing, cyanosis, or edema    CBC with Differential:    Lab Results   Component Value Date    WBC 9.8 10/12/2021    RBC 4.99 10/12/2021    HGB 13.3 10/12/2021    HCT 41.6 10/12/2021     10/12/2021    MCV 83.4 10/12/2021    MCH 26.7 10/12/2021    MCHC 32.0 10/12/2021    RDW 14.1 10/12/2021    BANDSPCT 1 09/26/2014    LYMPHOPCT 13.0 10/12/2021    MONOPCT 8.0 10/12/2021    BASOPCT 0.6 10/12/2021    MONOSABS 0.78 10/12/2021    LYMPHSABS 1.28 10/12/2021    EOSABS 0.01 10/12/2021    BASOSABS 0.06 10/12/2021     CMP:    Lab Results   Component Value Date     10/14/2021    K 3.7 10/14/2021    K 4.6 10/12/2021     10/14/2021    CO2 19 10/14/2021    BUN 32 10/14/2021    CREATININE 1.6 10/14/2021    GFRAA 51 10/14/2021    LABGLOM 42 10/14/2021    GLUCOSE 203 10/14/2021    PROT 7.4 10/12/2021    LABALBU 3.4 10/12/2021    CALCIUM 9.1 10/14/2021    BILITOT 0.4 10/12/2021    ALKPHOS 110 10/12/2021    AST 89 10/12/2021    ALT 29 10/12/2021    urine cult no growth    Assessment:    Patient Active Problem List   Diagnosis    HTN (hypertension)    [address]    Hyperlipidemia    Renal insufficiency    Osteoarthritis, knee    Acute kidney injury ([facility] [facility]   Cough  Diarrhea  infectious etiology?   No orthopnea or PND  Plan:  Await 2 D Echo   Check legionella strep pneumo and mycoplasma studies        Quique Escobar MD  8:57 AM  10/14/2021

## 2021-10-14 NOTE — PROGRESS NOTES
[address]       TWBD:                                                  Patient Name: Judit Chino  MRN: [id number]  : 1940  Room: [address] [place] [address]    Evaluating OT: Elsiemartha Zelaya OTR/L #[id number]    Referring Provider and Specific Provider Orders/Date:      10/13/21 0945  OT eval and treat Start: 10/13/21 0945, End: 10/13/21 0945, ONE TIME, Standing Count: 1 Occurrences, R      Haily Coppola MD       Diagnosis: Elevated troponin [R77.8]  Acute kidney injury ([facility] [address] [N17.9]     Surgery: None this admission      Pertinent Medical History:  has a past medical history of Acute pancreatitis, Diabetes mellitus ([facility] [address] History of snoring, HTN (hypertension), Hyperlipidemia, Osteoarthritis, Preoperative clearance, Preoperative clearance, Prostate cancer ([facility] [address] and Renal insufficiency.        Precautions:  Fall Risk     Assessment of current deficits   [x] Functional mobility  [x]ADLs  [x] Strength               []Cognition   [x] Functional transfers   [x] IADLs         [x] Safety Awareness   []Endurance   [] Fine Coordination              [x] Balance      [] Vision/perception   []Sensation    []Gross Motor Coordination  [] ROM  [] Delirium                   [] Motor Control     OT PLAN OF CARE   OT POC based on physician orders, patient diagnosis and results of clinical assessment    Frequency/Duration:  1-3 days/wk for 2 weeks PRN   Specific OT Treatment to include:   * Instruction/training on adapted ADL techniques and AE recommendations to increase functional independence within precautions       * Training on energy conservation strategies, correct breathing pattern and techniques to improve independence/tolerance for self-care routine  * Functional transfer/mobility training/DME recommendations for increased independence, safety, and fall prevention  * Patient/Family education to increase follow through with safety techniques and functional independence  * Recommendation of environmental modifications for increased safety with functional transfers/mobility and ADLs  * Cognitive retraining/development of therapeutic activities to improve problem solving, judgement, memory, and attention for increased safety/participation in ADL/IADL tasks  * Therapeutic exercise to improve motor endurance, ROM, and functional strength for ADLs/functional transfers  * Therapeutic activities to facilitate/challenge dynamic balance, stand tolerance for increased safety and independence with ADLs    Recommended Adaptive Equipment: TBD - pt would benefit from shower chair if plans to return home      Home Living: with wife; single family home, 1 story, No steps to enter. Bathroom set-up: tub/shower ? Equipment owned: cane, wheeled walker, 3:1 commode. Prior Level of Function: Pt reports being independent with ADLs , spouse completes most IADLs; ambulated with both cane and walker as needed, per pt's report. Driving: Yes   Occupation: Retired   Enjoys: Being with his grandchildren      Pain Level: Pt denied pain this date. Cognition: A&O: 4/4; Follows 2 step directions   Memory: intact    Sequencing: fair    Problem solving: fair    Judgement/safety: fair     Functional Assessment:  AM-PAC Daily Activity Raw Score: 15/24   Initial Eval Status  Date: 10/14/21 Treatment Status  Date: STGs = LTGs  Time frame: 10-14 days   Feeding Supervision     Independent    Grooming Supervision   Pt completed oral hygiene and face wash while standing at bathroom sink with forearms resting on countertop for support. Moderate [place]    UB Dressing Minimal Assist   Pt doffed/donned gown while seated in chair with assist for tele/IV management and to tie around neck.    Moderate [place]    LB Dressing Moderate Assist   Pt donned slip-on shoes with set-up assist. Increased assist needed for LB dressing tasks d/t decreased strength, activity tolerance, and balance. Moderate [place]    Bathing Moderate Assist     Moderate [place]    Toileting Moderate Assist   Pt completed rear hygiene standing supported at wheeled walker however assist provided for thoroughness following BM. Moderate [place]    Bed Mobility  Supine to sit: Minimal Assist   Sit to supine: NT   Supine to sit: Independent   Sit to supine: Independent    Functional Transfers Minimal Assist from EOB to wheeled walker. Minimal Assist for sit <> stand transfer at commode and chair. Transfer training with verbal cues for hand placement throughout session to improve safety. Moderate [place]    Functional Mobility Minimal Assist with wheeled walker to/from bathroom to improve balance, verbal cues for walker sequence and safety. Moderate [place]    Balance Sitting:     Static: fair     Dynamic: fair   Standing: fair  with walker   Sitting:     Static: good     Dynamic: good   Standing: good  with walker    Activity Tolerance fair  ; pt tolerated standing at BR sink for up to x2 mins. Increase standing tolerance >3 minutes for improved engagement with functional transfers and indep in ADLs   Visual/  Perceptual Glasses: Yes - bifocals     Reports changes in vision since admission: No      NA      Hand Dominance: Right      AROM (PROM) Strength Additional Info:    RUE  Shoulder AROM limited - chronic in nature 3-/5 Good  and wfl FMC/dexterity noted during ADL tasks     LUE Shoulder AROM WFL  3/5 Good  and wfl FMC/dexterity noted during ADL tasks       Hearing:  WFL   Sensation:   No c/o numbness or tingling  Tone:  WFL   Edema: None     Comments: RN cleared patient for OT. Upon arrival patient in supine.   Therapist facilitated and instructed pt on adapted  techniques & compensatory strategies to improve safety and independence with basic ADLs, bed mobility, functional transfers and mobility  to allow pt to achieve highest level of independence and safely. Pt demonstrated fair understanding of education & follow through. At end of session, patient was in chair with call light and phone within reach, all lines and tubes intact. Overall, patient demonstrated  decreased independence and safety during completion of ADL tasks. Pt would benefit from continued skilled OT to increase safety and independence with completion of ADL tasks and functional mobility for improved quality of life. Treatment: OT treatment provided this date includes:     Instruction/training on safety and adapted techniques for completion of ADLs; grooming tasks completed while standing at  Sink. Time needed d/t decreased strength. Extensive time needed for toileting/hygiene following bowel [facility] at [facility].   Instruction/training on safe functional mobility/transfer techniques from household surfaces with focus on safety and hand  placement    Instruction/training on energy conservation/work simplification for completion of ADLs     Rehab Potential: Good for established goals. LTG: maximize independence with ADLs to return to PLOF     Patient / Family Goal: Return home       Patient and/or family were instructed on functional diagnosis, prognosis/goals and OT plan of care. Demonstrated fair understanding.      Eval Complexity: Low    Time In: 10:20 AM   Time Out: 11:00 AM    Total Treatment Time: 25      Min Units   OT Eval Low 97165  X  1    OT Eval Medium 25470      OT Eval High 38013      OT Re-Eval 29362            ADL/Self Care 84055 15 1   Therapeutic Activities 10714 10 1   Therapeutic Ex 32807       Orthotic Management 95493       Manual [id number]     Neuro Re-Ed 06960       Non-Billable Time        Evaluation Time additionally includes thorough review of current medical information, gathering information on past medical history/social history and prior level of function, interpretation of standardized testing/informal observation of tasks, assessment of data and development of plan of care and goals.         Evaluating OT: Romero Kelly OTR/L #[id number]

## 2021-10-15 LAB
ANION GAP SERPL CALCULATED.3IONS-SCNC: 13 MMOL/L (ref 7–16)
APTT: 31.6 SEC (ref 24.5–35.1)
BUN BLDV-MCNC: 28 MG/DL (ref 6–23)
CALCIUM SERPL-MCNC: 9.1 MG/DL (ref 8.6–10.2)
CHLORIDE BLD-SCNC: 100 MMOL/L (ref 98–107)
CO2: 20 MMOL/L (ref 22–29)
CREAT SERPL-MCNC: 1.4 MG/DL (ref 0.7–1.2)
EKG ATRIAL RATE: 178 BPM
EKG Q-T INTERVAL: 414 MS
EKG QRS DURATION: 80 MS
EKG QTC CALCULATION (BAZETT): 449 MS
EKG R AXIS: 1 DEGREES
EKG T AXIS: -100 DEGREES
EKG VENTRICULAR RATE: 71 BPM
GFR AFRICAN AMERICAN: 59
GFR NON-AFRICAN AMERICAN: 49 ML/MIN/1.73
GLUCOSE BLD-MCNC: 183 MG/DL (ref 74–99)
HCT VFR BLD CALC: 37 % (ref 37–54)
HCT VFR BLD CALC: 37.6 % (ref 37–54)
HEMOGLOBIN: 11.6 G/DL (ref 12.5–16.5)
HEMOGLOBIN: 11.9 G/DL (ref 12.5–16.5)
L. PNEUMOPHILA SEROGP 1 UR AG: NORMAL
MAGNESIUM: 2.3 MG/DL (ref 1.6–2.6)
MCH RBC QN AUTO: 26.1 PG (ref 26–35)
MCH RBC QN AUTO: 26.2 PG (ref 26–35)
MCHC RBC AUTO-ENTMCNC: 30.9 % (ref 32–34.5)
MCHC RBC AUTO-ENTMCNC: 32.2 % (ref 32–34.5)
MCV RBC AUTO: 81.3 FL (ref 80–99.9)
MCV RBC AUTO: 84.5 FL (ref 80–99.9)
METER GLUCOSE: 142 MG/DL (ref 74–99)
METER GLUCOSE: 200 MG/DL (ref 74–99)
METER GLUCOSE: 207 MG/DL (ref 74–99)
PDW BLD-RTO: 13.9 FL (ref 11.5–15)
PDW BLD-RTO: 13.9 FL (ref 11.5–15)
PHOSPHORUS: 2.2 MG/DL (ref 2.5–4.5)
PLATELET # BLD: 193 E9/L (ref 130–450)
PLATELET # BLD: 233 E9/L (ref 130–450)
PMV BLD AUTO: 11.6 FL (ref 7–12)
PMV BLD AUTO: 12 FL (ref 7–12)
POTASSIUM SERPL-SCNC: 4 MMOL/L (ref 3.5–5)
RBC # BLD: 4.45 E12/L (ref 3.8–5.8)
RBC # BLD: 4.55 E12/L (ref 3.8–5.8)
SODIUM BLD-SCNC: 133 MMOL/L (ref 132–146)
STREP PNEUMONIAE ANTIGEN, URINE: NORMAL
WBC # BLD: 7.8 E9/L (ref 4.5–11.5)
WBC # BLD: 8.3 E9/L (ref 4.5–11.5)

## 2021-10-15 PROCEDURE — 93005 ELECTROCARDIOGRAM TRACING: CPT | Performed by: INTERNAL MEDICINE

## 2021-10-15 PROCEDURE — 85730 THROMBOPLASTIN TIME PARTIAL: CPT

## 2021-10-15 PROCEDURE — 6360000002 HC RX W HCPCS: Performed by: INTERNAL MEDICINE

## 2021-10-15 PROCEDURE — 36415 COLL VENOUS BLD VENIPUNCTURE: CPT

## 2021-10-15 PROCEDURE — 6370000000 HC RX 637 (ALT 250 FOR IP): Performed by: INTERNAL MEDICINE

## 2021-10-15 PROCEDURE — 87449 NOS EACH ORGANISM AG IA: CPT

## 2021-10-15 PROCEDURE — 2500000003 HC RX 250 WO HCPCS: Performed by: INTERNAL MEDICINE

## 2021-10-15 PROCEDURE — 6360000002 HC RX W HCPCS: Performed by: NURSE PRACTITIONER

## 2021-10-15 PROCEDURE — 2580000003 HC RX 258: Performed by: INTERNAL MEDICINE

## 2021-10-15 PROCEDURE — 80048 BASIC METABOLIC PNL TOTAL CA: CPT

## 2021-10-15 PROCEDURE — 82962 GLUCOSE BLOOD TEST: CPT

## 2021-10-15 PROCEDURE — 93010 ELECTROCARDIOGRAM REPORT: CPT | Performed by: INTERNAL MEDICINE

## 2021-10-15 PROCEDURE — 94640 AIRWAY INHALATION TREATMENT: CPT

## 2021-10-15 PROCEDURE — 85027 COMPLETE CBC AUTOMATED: CPT

## 2021-10-15 PROCEDURE — 6370000000 HC RX 637 (ALT 250 FOR IP): Performed by: NURSE PRACTITIONER

## 2021-10-15 PROCEDURE — 83735 ASSAY OF MAGNESIUM: CPT

## 2021-10-15 PROCEDURE — 84100 ASSAY OF PHOSPHORUS: CPT

## 2021-10-15 PROCEDURE — 6370000000 HC RX 637 (ALT 250 FOR IP): Performed by: FAMILY MEDICINE

## 2021-10-15 PROCEDURE — 99233 SBSQ HOSP IP/OBS HIGH 50: CPT | Performed by: INTERNAL MEDICINE

## 2021-10-15 PROCEDURE — 2060000000 HC ICU INTERMEDIATE R&B

## 2021-10-15 RX ORDER — HEPARIN SODIUM 10000 [USP'U]/100ML
7.8 INJECTION, SOLUTION INTRAVENOUS CONTINUOUS
Status: DISCONTINUED | OUTPATIENT
Start: 2021-10-15 | End: 2021-10-19

## 2021-10-15 RX ORDER — AMLODIPINE BESYLATE 5 MG/1
5 TABLET ORAL DAILY
Status: DISCONTINUED | OUTPATIENT
Start: 2021-10-16 | End: 2021-10-25 | Stop reason: HOSPADM

## 2021-10-15 RX ORDER — LEVALBUTEROL INHALATION SOLUTION 0.63 MG/3ML
0.63 SOLUTION RESPIRATORY (INHALATION) 3 TIMES DAILY
Status: DISCONTINUED | OUTPATIENT
Start: 2021-10-15 | End: 2021-10-25 | Stop reason: HOSPADM

## 2021-10-15 RX ORDER — HEPARIN SODIUM 1000 [USP'U]/ML
4000 INJECTION, SOLUTION INTRAVENOUS; SUBCUTANEOUS ONCE
Status: COMPLETED | OUTPATIENT
Start: 2021-10-15 | End: 2021-10-15

## 2021-10-15 RX ORDER — HEPARIN SODIUM 1000 [USP'U]/ML
4000 INJECTION, SOLUTION INTRAVENOUS; SUBCUTANEOUS PRN
Status: DISCONTINUED | OUTPATIENT
Start: 2021-10-15 | End: 2021-10-19

## 2021-10-15 RX ORDER — METOPROLOL SUCCINATE 25 MG/1
25 TABLET, EXTENDED RELEASE ORAL DAILY
Status: DISCONTINUED | OUTPATIENT
Start: 2021-10-15 | End: 2021-10-20

## 2021-10-15 RX ORDER — HEPARIN SODIUM 1000 [USP'U]/ML
2000 INJECTION, SOLUTION INTRAVENOUS; SUBCUTANEOUS PRN
Status: DISCONTINUED | OUTPATIENT
Start: 2021-10-15 | End: 2021-10-19

## 2021-10-15 RX ORDER — GUAIFENESIN/DEXTROMETHORPHAN 100-10MG/5
5 SYRUP ORAL EVERY 4 HOURS PRN
Status: DISCONTINUED | OUTPATIENT
Start: 2021-10-15 | End: 2021-10-25 | Stop reason: HOSPADM

## 2021-10-15 RX ADMIN — AMLODIPINE BESYLATE 10 MG: 10 TABLET ORAL at 08:50

## 2021-10-15 RX ADMIN — BENZONATATE 200 MG: 100 CAPSULE ORAL at 20:19

## 2021-10-15 RX ADMIN — HEPARIN SODIUM 7.8 UNITS/KG/HR: 10000 INJECTION, SOLUTION INTRAVENOUS at 16:47

## 2021-10-15 RX ADMIN — ROSUVASTATIN 10 MG: 10 TABLET, FILM COATED ORAL at 17:33

## 2021-10-15 RX ADMIN — INSULIN LISPRO 2 UNITS: 100 INJECTION, SOLUTION INTRAVENOUS; SUBCUTANEOUS at 08:50

## 2021-10-15 RX ADMIN — Medication 1 CAPSULE: at 08:50

## 2021-10-15 RX ADMIN — HEPARIN SODIUM 5000 UNITS: 10000 INJECTION INTRAVENOUS; SUBCUTANEOUS at 06:07

## 2021-10-15 RX ADMIN — HEPARIN SODIUM 4000 UNITS: 1000 INJECTION, SOLUTION INTRAVENOUS; SUBCUTANEOUS at 16:54

## 2021-10-15 RX ADMIN — LEVALBUTEROL HYDROCHLORIDE 0.63 MG: 0.63 SOLUTION RESPIRATORY (INHALATION) at 16:27

## 2021-10-15 RX ADMIN — FIDAXOMICIN 200 MG: 200 TABLET, FILM COATED ORAL at 20:19

## 2021-10-15 RX ADMIN — BENZONATATE 200 MG: 100 CAPSULE ORAL at 13:44

## 2021-10-15 RX ADMIN — METOPROLOL SUCCINATE 25 MG: 25 TABLET, EXTENDED RELEASE ORAL at 14:52

## 2021-10-15 RX ADMIN — LEVALBUTEROL HYDROCHLORIDE 0.63 MG: 0.63 SOLUTION RESPIRATORY (INHALATION) at 21:43

## 2021-10-15 RX ADMIN — HEPARIN SODIUM 5000 UNITS: 10000 INJECTION INTRAVENOUS; SUBCUTANEOUS at 13:44

## 2021-10-15 RX ADMIN — BENZONATATE 200 MG: 100 CAPSULE ORAL at 08:49

## 2021-10-15 RX ADMIN — FIDAXOMICIN 200 MG: 200 TABLET, FILM COATED ORAL at 08:50

## 2021-10-15 RX ADMIN — INSULIN LISPRO 4 UNITS: 100 INJECTION, SOLUTION INTRAVENOUS; SUBCUTANEOUS at 11:56

## 2021-10-15 RX ADMIN — SODIUM BICARBONATE: 84 INJECTION, SOLUTION INTRAVENOUS at 12:41

## 2021-10-15 RX ADMIN — INSULIN LISPRO 2 UNITS: 100 INJECTION, SOLUTION INTRAVENOUS; SUBCUTANEOUS at 20:19

## 2021-10-15 ASSESSMENT — PAIN SCALES - GENERAL
PAINLEVEL_OUTOF10: 0
PAINLEVEL_OUTOF10: 0

## 2021-10-15 NOTE — PROGRESS NOTES
Pulmonary Subsequent Hospital F/U note    Patient is being followed for: cough/ wheezing     Interval HPI:  Still feeling fatigued and run down   Has been having a lot of diarrhea x 4 days  Somewhat better today  Blood on the toilet paper when he wipes  Still having a cough  Less wheezing  He thinks the breathing treatments may be helping     ROS:  Denies fever, night sweats  Denies  Hemoptysis _+cough and wheezing   Denies chest pain, edema, palpitations  Denies nausea at present  Poor appetite  +diarrhea  Non abdominal pain     Exam:  BP (!) 120/59   Pulse 79   Temp 97.3 °F (36.3 °C) (Temporal)   Resp 17   Ht 5' 7\" (1.702 m)   Wt 280 lb (127 kg)   SpO2 95%   BMI 43.85 kg/m²    General: Patient is resting comfortably, no distress, breathing is not labored  HEENT: PERRL, EOMI, MMM, no oral lesions  Neck: supple no adenopathy  CV: RRR without murmur  Lungs: no wheezing today  Abd: soft, ND, NT, bowel sounds normal  Ext: warm, no edema    Data:    Oximetry:  SpO2 Readings from Last 1 Encounters:   10/14/21 95%       Imaging personally reviewed by myself:  CXR      Impression   Normal chest         VQ      Very low probability for pulmonary embolism.             DVT       No evidence of DVT in either lower extremity.             US kidneys      No hydronephrosis.  No renal calculus identified       Simple appearing cysts in bilateral kidneys.              Pertinent labs reviewed and noted:  Lab Results   Component Value Date    WBC 9.8 10/12/2021    HGB 13.3 10/12/2021    HCT 41.6 10/12/2021    MCV 83.4 10/12/2021    MCH 26.7 10/12/2021    MCHC 32.0 10/12/2021    RDW 14.1 10/12/2021     10/12/2021    MPV 12.3 10/12/2021     Lab Results   Component Value Date     10/14/2021    K 3.7 10/14/2021    K 4.6 10/12/2021     10/14/2021    CO2 19 10/14/2021    BUN 32 10/14/2021    CREATININE 1.6 10/14/2021    LABALBU 3.4 10/12/2021    CALCIUM 9.1 10/14/2021    GFRAA 51 10/14/2021    LABGLOM 42 10/14/2021     Lab Results   Component Value Date    PROTIME 24.1 09/26/2014    INR 2.2 09/26/2014          Assessment:  1. Generalized weakness and fatigue  2. Elevated D dimer   3. Intermittent dyspnea on exertion  4. Acute on chronic kidney disease  5. Dehydration  6. Diabetes  7. Prostate cancer s/p radiation  8. Obesity  9. Cough and wheezing   10. Diarrhea secondary to C diff colitis   11. Recent UTI for which he received bactrim     Plan:  1. Continue bronchodilators  2. Awaiting the results of his echocardiogram  3. Start Fidaxomicin 200mg BID x 10 days for C diff  4. Sodium bicarb infusion per renal service. 5. Hold off on inpatient sleep study for now.   We can perform this as an outpatient    Electronically signed by Kareem Silveira MD on 10/14/2021 at 9:05 PM       Electronically signed by Kareem Silveira MD on 10/14/2021 at 9:01 PM

## 2021-10-15 NOTE — PROGRESS NOTES
[facility]  Nephrology Attending Progress Note  Zacarias Skiff. Jo Chaparro MD        SUBJECTIVE:   10/15/21- awake and alert. Feeling better, ate breakfast this am.     PROBLEM LIST:    Patient Active Problem List   Diagnosis    HTN (hypertension)    Diabetes ([facility] [address]    Hyperlipidemia    Renal insufficiency    Osteoarthritis, knee    Acute kidney injury ([facility] [address]    Hyponatremia    Metabolic acidosis    Acute respiratory distress        PAST MEDICAL HISTORY:    Past Medical History:   Diagnosis Date    Acute pancreatitis     Diabetes mellitus ([facility] [address]     History of snoring     HTN (hypertension)     Hyperlipidemia     Osteoarthritis     Preoperative clearance 8/29/2014    cardiac- Dr. Gerda Hope; note in epic for surgery 9/23/2014    Preoperative clearance 9-19-14    medical clearance for OR 9-23-14 from Dr. Allen Cavazos on paper chart    Prostate cancer [facility]) 1999    radiation, seed implant    Renal insufficiency        DIET:    ADULT DIET; Regular     PHYSICAL EXAM:     Patient Vitals for the past 24 hrs:   BP Temp Temp src Pulse Resp SpO2   10/15/21 0735 130/60 97.2 °F (36.2 °C) Temporal 76 16 96 %   10/15/21 0015 131/73 98.1 °F (36.7 °C) Temporal 65 19 98 %   10/14/21 2158 -- -- -- -- -- 98 %   10/14/21 2000 (!) 120/59 97.3 °F (36.3 °C) Temporal 79 17 95 %   10/14/21 1630 -- -- -- -- -- 99 %   @      Intake/Output Summary (Last 24 hours) at 10/15/2021 1035  Last data filed at 10/15/2021 1014  Gross per 24 hour   Intake 200 ml   Output 250 ml   Net -50 ml         Wt Readings from Last 3 Encounters:   10/11/21 280 lb (127 kg)   09/18/14 265 lb (120.2 kg)   08/28/14 265 lb 8 oz (120.4 kg)       Constitutional:  Pt is in no acute distress  Head: normocephalic, atraumatic  Neck: no JVD  Cardiovascular: S1 s2 no S3 or rub  Respiratory:  Clear and equal bilaterally   Gastrointestinal:  Soft, nontender, nondistended. Ext: no edema   Skin: dry, no rash  Neuro: awake and alert. MEDS (scheduled):     Fidaxomicin  200 mg Oral BID    lactobacillus  1 capsule Oral Daily    benzonatate  200 mg Oral TID    insulin lispro  0-12 Units SubCUTAneous TID     insulin lispro  0-6 Units SubCUTAneous Nightly    heparin (porcine)  5,000 Units SubCUTAneous Q8H    influenza virus vaccine  0.5 mL IntraMUSCular Prior to discharge    amLODIPine  10 mg Oral Daily    rosuvastatin  10 mg Oral QPM       MEDS (infusions):   sodium bicarbonate infusion 100 mL/hr at 10/13/21 1732    dextrose         MEDS (prn):  temazepam, racepinephrine HCl, sodium chloride nebulizer, glucose, dextrose, glucagon (rDNA), dextrose, perflutren lipid microspheres, ondansetron **OR** ondansetron, polyethylene glycol, acetaminophen **OR** acetaminophen    DATA:    Recent Labs     10/15/21  0620   WBC 7.8   HGB 11.6*   HCT 37.6   MCV 84.5        Recent Labs     10/13/21  0730 10/14/21  0624 10/15/21  0620    133 133   K 4.3 3.7 4.0    100 100   CO2 15* 19* 20*   BUN 35* 32* 28*   CREATININE 2.0* 1.6* 1.4*   LABGLOM 32 42 49   GLUCOSE 125* 203* 183*   CALCIUM 9.1 9.1 9.1   MG  --   --  2.3   PHOS  --   --  2.2*       Lab Results   Component Value Date    LABALBU 3.4 (L) 10/12/2021    LABALBU 3.5 10/11/2021    LABALBU 3.5 09/25/2014    LABALBU 2.8 (L) 09/25/2014     No results found for: TSH    Iron Studies  Lab Results   Component Value Date    IRON 9 (L) 09/25/2014    TIBC 334 09/25/2014    FERRITIN 205 09/25/2014     Vitamin B-12   Date Value Ref Range Status   09/25/2014 225 211 - 946 pg/mL Final     Folate   Date Value Ref Range Status   09/25/2014 13.0 7.3 - 26.1 ng/mL Final       Vit D, 25-Hydroxy   Date Value Ref Range Status   09/25/2014 9 (L) 30 - 100 ng/mL Final     Comment:     <20 ng/mL. ........... Hunter Morales Deficient  20-30 ng/mL. ......... Hunter Morales Insufficient   ng/mL. ........ Hunter Morales Sufficient  >100 ng/mL. .......... Hunter Morales Toxic       PTH   Date Value Ref Range Status   09/25/2014 42 15 - 65 pg/mL Final       No components found for: URIC    Lab Results Component Value Date    COLORU Yellow 10/12/2021    NITRU Negative 10/12/2021    GLUCOSEU Negative 10/12/2021    KETUA Negative 10/12/2021    UROBILINOGEN 0.2 10/12/2021    BILIRUBINUR SMALL 10/12/2021       No results found for: LIPIDPAN      IMPRESSION/RECOMMENDATIONS:         1. Hyponatremia  Na 129-->133     2. SAURABH  Baseline from 2014 of 1.5  Cr now 2.9>2.6.->2.0-->1.6-->1.4 with ivf  No hydro on shirley  Check daily bmp  Holding cozaar  Sp bactrim last week     3. Shortness of breath-  Pulmonology following also     4. Hypertension-  Controlled at goal<130/80  Off  cozaar  On norvasc     5.  Metabolic acidosis  In setting of ckd  Improving on IVF with HCO3       CHOCO Warner - CNP     Pt seen and examined agree with above  Cr improving with ivf  Diarrhea improved  Kesha Ferguson MD

## 2021-10-15 NOTE — PROGRESS NOTES
INPATIENT CARDIOLOGY Follow UP    Name: Nicole Hansen    Age: [de-identified] y.o. Date of Admission: 10/11/2021  7:11 PM    Date of Service: 10/15/2021      Referring Physician: Andreas Vu MD      Subjective: Lying in bed comfortably. Report breathing is improving. Wheezes also improving        Past Medical History:  Past Medical History:   Diagnosis Date    Acute pancreatitis     Diabetes mellitus ([address]     History of snoring     HTN (hypertension)     Hyperlipidemia     Osteoarthritis     Preoperative clearance 2014    cardiac- Dr. Brianna Suarez; note in epic for surgery 2014    Preoperative clearance 14    medical clearance for OR 14 from Dr. Davie Hansen on paper chart    Prostate cancer [facility])     radiation, seed implant    Renal insufficiency        Past Surgical History:  Past Surgical History:   Procedure Laterality Date    BELPHAROPTOSIS REPAIR Bilateral 2014    CATARACT REMOVAL WITH IMPLANT Right 10/2012    CATARACT REMOVAL WITH IMPLANT Left 2012    CHEST SURGERY  1985    benign lumpectomy    CHOLECYSTECTOMY  1995    KNEE SURGERY Left     meniscus repair    KNEE SURGERY Right 1992    meniscus repair    SPINE SURGERY  2007    lower spine fx, disc repair, [facility], [place].  Dr. Nanda Garsia       Family History:  Family History   Problem Relation Age of Onset    Heart Disease Father          age 72       Social History:  Social History     Socioeconomic History    Marital status:      Spouse name: Not on file    Number of children: Not on file    Years of education: Not on file    Highest education level: Not on file   Occupational History    Not on file   Tobacco Use    Smoking status: Never Smoker    Smokeless tobacco: Never Used   Substance and Sexual Activity    Alcohol use: Yes     Comment: occasional    Drug use: No    Sexual activity: Not on file   Other Topics Concern    Not on file   Social History Narrative    Not on file Social Determinants of Health     Financial Resource Strain:     Difficulty of Paying Living Expenses:    Food Insecurity:     Worried About Running Out of Food in the Last Year:     [address] in the Last Year:    Transportation Needs:     Lack of Transportation (Medical):  Lack of Transportation (Non-Medical):    Physical Activity:     Days of Exercise per Week:     Minutes of Exercise per Session:    Stress:     Feeling of Stress :    Social Connections:     Frequency of Communication with Friends and Family:     Frequency of Social Gatherings with Friends and Family:     Attends Sabianist Services:     Active Member of Clubs or Organizations:     Attends Club or Organization Meetings:     Marital Status:    Intimate Partner Violence:     Fear of Current or Ex-Partner:     Emotionally Abused:     Physically Abused:     Sexually Abused: Allergies:  No Known Allergies    Home Medications:  Prior to Admission medications    Medication Sig Start Date End Date Taking? Authorizing Provider   oxybutynin (DITROPAN) 5 MG tablet Take 5 mg by mouth daily   Yes Historical Provider, MD   atorvastatin (LIPITOR) 20 MG tablet Take 20 mg by mouth daily   Yes Historical Provider, MD   losartan (COZAAR) 50 MG tablet Take 50 mg by mouth daily   Yes Historical Provider, MD   fenofibrate (TRIGLIDE) 160 MG tablet Take 160 mg by mouth daily   Yes Historical Provider, MD   tamsulosin (FLOMAX) 0.4 MG capsule Take 0.4 mg by mouth nightly   Yes Historical Provider, MD   insulin detemir (LEVEMIR FLEXTOUCH) 100 UNIT/ML injection pen Inject 25 Units into the skin 2 times daily   Yes Historical Provider, MD   insulin aspart (NOVOLOG FLEXPEN) 100 UNIT/ML injection pen Inject 0-32 Units into the skin 3 times daily (before meals) *Per Sliding Scale*   Yes Historical Provider, MD   amLODIPine (NORVASC) 10 MG tablet Take 10 mg by mouth daily.  Instructed to take morning of surgery with a sip of water 7/31/14  Yes Historical Provider, MD       Current Medications:  Current Facility-Administered Medications   Medication Dose Route Frequency Provider Last Rate Last Admin    Fidaxomicin (DIFICID) tablet 200 mg  200 mg Oral BID Lalo Andrews MD   200 mg at 10/15/21 0850    temazepam (RESTORIL) capsule 15 mg  15 mg Oral Nightly PRN Triston Deluca MD        lactobacillus (CULTURELLE) capsule 1 capsule  1 capsule Oral Daily Madelyn Tolliver MD   1 capsule at 10/15/21 0850    racepinephrine HCl (VAPONEFPRIN) 2.25 % nebulizer solution NEBU 11.25 mg  11.25 mg Nebulization Q4H PRN Madelyn Tolliver MD        sodium chloride nebulizer 0.9 % solution 3 mL  3 mL Nebulization Q4H PRN Madelyn Tolliver MD        benzonatate (TESSALON) capsule 200 mg  200 mg Oral TID Madelyn Tolliver MD   200 mg at 10/15/21 0849    sodium bicarbonate 50 mEq in dextrose 5 % 1,000 mL infusion   IntraVENous Continuous Madelyn Tolliver  mL/hr at 10/13/21 1732 New Bag at 10/13/21 1732    insulin lispro (HUMALOG) injection vial 0-12 Units  0-12 Units SubCUTAneous TID WC Triston Deluca MD   2 Units at 10/15/21 0850    insulin lispro (HUMALOG) injection vial 0-6 Units  0-6 Units SubCUTAneous Nightly Triston Deluca MD   1 Units at 10/14/21 2008    glucose (GLUTOSE) 40 % oral gel 15 g  15 g Oral PRN Triston Deluca MD        dextrose 50 % IV solution  12.5 g IntraVENous PRN Triston Deluca MD        glucagon (rDNA) injection 1 mg  1 mg IntraMUSCular PRN Triston Deluca MD        dextrose 5 % solution  100 mL/hr IntraVENous PRN Triston Deluca MD        perflutren lipid microspheres (DEFINITY) injection 1.65 mg  1.5 mL IntraVENous ONCE PRN Madelyn Tolliver MD        heparin (porcine) injection 5,000 Units  5,000 Units SubCUTAneous Irven MD Juan Carlos   5,000 Units at 10/15/21 0607    influenza quadrivalent split vaccine (FLUZONE;FLUARIX;FLULAVAL;AFLURIA) injection 0.5 mL  0.5 mL IntraMUSCular Prior to discharge Triston Deluca MD        amLODIPine (NORVASC) tablet 10 mg  10 mg Oral Daily Ernst Oconnell MD   10 mg at 10/15/21 0850    rosuvastatin (CRESTOR) tablet 10 mg  10 mg Oral QPM Ernst Oconnell MD   10 mg at 10/14/21 1845    ondansetron (ZOFRAN-ODT) disintegrating tablet 4 mg  4 mg Oral Q8H PRN Ernst Oconnell MD        Or    [facility]etron [facility]) injection 4 mg  4 mg IntraVENous Q6H PRN Ernst Oconnell MD        polyethylene glycol [facility]) packet 17 g  17 g Oral Daily PRN Ernst Oconnell MD        acetaminophen (TYLENOL) tablet 650 mg  650 mg Oral Q6H PRN Ernst Oconnell MD        Or    acetaminophen (TYLENOL) suppository 650 mg  650 mg Rectal Q6H PRN Ernst Oconnell MD          sodium bicarbonate infusion 100 mL/hr at 10/13/21 1732    dextrose         Physical Exam:  /60   Pulse 76   Temp 97.2 °F (36.2 °C) (Temporal)   Resp 16   Ht 5' 7\" (1.702 m)   Wt 280 lb (127 kg)   SpO2 96%   BMI 43.85 kg/m²   Wt Readings from Last 3 Encounters:   10/11/21 280 lb (127 kg)   09/18/14 265 lb (120.2 kg)   08/28/14 265 lb 8 oz (120.4 kg)       Appearance: Alert and oriented x3 not in acute distress.   Skin: Dry skin  Head: Atraumatic  Eyes: Intact extraocular muscles   ENMT: Mucous membranes are moist  Neck: Supple  Lungs: Wheezes are are improving   Cardiac: Normal S1 and S2  Abdomen: Protuberant  Extremities: Intact range of motion  Neurologic: No focal neurological deficits  Peripheral Pulses: 2+ peripheral pulses    Intake/Output:    Intake/Output Summary (Last 24 hours) at 10/15/2021 1057  Last data filed at 10/15/2021 1014  Gross per 24 hour   Intake 200 ml   Output 250 ml   Net -50 ml     I/O this shift:  In: 200 [P.O.:200]  Out: 250 [Urine:250]    Laboratory Tests:  Recent Labs     10/13/21  0730 10/14/21  0624 10/15/21  0620    133 133   K 4.3 3.7 4.0    100 100   CO2 15* 19* 20*   BUN 35* 32* 28*   CREATININE 2.0* 1.6* 1.4*   GLUCOSE 125* 203* 183*   CALCIUM 9.1 9.1 9.1     Lab Results   Component Value Date    MG 2.3 10/15/2021    MG 2.1 10/11/2021 MG 1.7 09/26/2014     No results for input(s): ALKPHOS, ALT, AST, PROT, BILITOT, BILIDIR, LABALBU in the last 72 hours. Recent Labs     10/15/21  0620   WBC 7.8   RBC 4.45   HGB 11.6*   HCT 37.6   MCV 84.5   MCH 26.1   MCHC 30.9*   RDW 13.9      MPV 12.0     No results found for: CKTOTAL, CKMB, CKMBINDEX, TROPONINI  No results for input(s): CKTOTAL, CKMB, CKMBINDEX, TROPHS in the last 72 hours. Lab Results   Component Value Date    INR 2.2 09/26/2014    INR 1.8 09/25/2014    INR 1.2 09/24/2014    PROTIME 24.1 (H) 09/26/2014    PROTIME 19.9 (H) 09/25/2014    PROTIME 12.4 (H) 09/24/2014     No results found for: TSHFT4, TSH  Lab Results   Component Value Date    LABA1C 6.4 (H) 10/12/2021    LABA1C 7.7 (H) 09/24/2014     No results found for: EAG  Lab Results   Component Value Date    CHOL 97 10/13/2021     Lab Results   Component Value Date    TRIG 281 (H) 10/13/2021     Lab Results   Component Value Date    HDL 11 10/13/2021     Lab Results   Component Value Date    LDLCALC 30 10/13/2021     Lab Results   Component Value Date    LABVLDL 56 10/13/2021     No results found for: CHOLHDLRATIO  Recent Labs     10/13/21  0730   PROBNP 11,016*       Cardiac Tests:      Echocardiogram reviewed:     Stress test reviewed:      Cardiac catheterization reviewed:     CXR reviewed: The ASCVD Risk score (Shamika Bey et al., 2013) failed to calculate for the following reasons: The 2013 ASCVD risk score is only valid for ages 36 to 78    ASSESSMENT / PLAN:    1. Acute kidney injury ([facility] [address]  Likely from dehydration  Continue rehydration  Nephrology on board    2. Elevated troponin  Currently denies chest pain  Monitor closely and once diarrhea and COPD improved, he will benefit from pharmacologic myocardial perfusion stress test.  Echocardiogram to guide therapy  Ischemic evaluation with Lexiscan pharmacologic myocardial perfusion stress test once COPD exacerbation has improved    3.   Elevated BNP  Likely from acute kidney injury and COPD  Monitor closely  Awaiting echo to guide therapy    4. Diarrhea  Management per primary service    5. Hypertension    6. Diabetes    7. Obesity    8. Suspected sleep apnea  Pulmonology on board and appreciate your input    9. Osteoarthritis                Thank you for allowing me to participate in your patient's care. Please feel free to contact me if you have any questions or concerns.     Rico Rudolph, APRN - [phone] [facility]

## 2021-10-15 NOTE — PROGRESS NOTES
significant aortic stenosis is present. Mild tricuspid regurgitation. Pertinent labs reviewed and noted:  Lab Results   Component Value Date    WBC 7.8 10/15/2021    HGB 11.6 10/15/2021    HCT 37.6 10/15/2021    MCV 84.5 10/15/2021    MCH 26.1 10/15/2021    MCHC 30.9 10/15/2021    RDW 13.9 10/15/2021     10/15/2021    MPV 12.0 10/15/2021     Lab Results   Component Value Date     10/15/2021    K 4.0 10/15/2021    K 4.6 10/12/2021     10/15/2021    CO2 20 10/15/2021    BUN 28 10/15/2021    CREATININE 1.4 10/15/2021    LABALBU 3.4 10/12/2021    CALCIUM 9.1 10/15/2021    GFRAA 59 10/15/2021    LABGLOM 49 10/15/2021     Lab Results   Component Value Date    PROTIME 24.1 09/26/2014    INR 2.2 09/26/2014          Assessment:  1. Generalized weakness and fatigue  2. Elevated D dimer   3. Intermittent dyspnea on exertion  4. Acute on chronic kidney disease, improving   5. Dehydration  6. Diabetes  7. Prostate cancer s/p radiation  8. Obesity  9. Cough and wheezing likely secondary to URI  10. Diarrhea secondary to C diff colitis   11. Recent UTI for which he received bactrim   12. Atrial fibrillation  13. Cardiomyopathy with EF 40%    Plan:  1. Continue bronchodilators but change to xopenex  2. Echocardiogram and EKG reviewed - await cardiology opinion   3. Fidaxomicin 200mg BID x 10 days for C diff  4. Sodium bicarb infusion per renal service - rate decreased today  5. Hold off on inpatient sleep study until he is feeling better, however, I would definitely recommend this outpatient  6. Culture sputum    Patient was very tearful today. Support provided.        Electronically signed by Kylie Pena MD on 10/15/2021 at 3:13 PM

## 2021-10-15 NOTE — PROGRESS NOTES
Subjective: The patient is awake and alert. No problems overnight. Denies chest pain, angina, and dyspnea. Denies abdominal pain. Tolerating diet. No nausea or vomiting. No diarrhea    Objective:    /60   Pulse 76   Temp 97.2 °F (36.2 °C) (Temporal)   Resp 16   Ht 5' 7\" (1.702 m)   Wt 280 lb (127 kg)   SpO2 96%   BMI 43.85 kg/m²     Heart:  IRRR, no murmurs, gallops, or rubs. Lungs:  CTA bilaterally, no wheeze, rales or rhonchi  Abd: bowel sounds present, nontender, nondistended, no masses  Extrem:  No clubbing, cyanosis, or edema    CBC with Differential:    Lab Results   Component Value Date    WBC 7.8 10/15/2021    RBC 4.45 10/15/2021    HGB 11.6 10/15/2021    HCT 37.6 10/15/2021     10/15/2021    MCV 84.5 10/15/2021    MCH 26.1 10/15/2021    MCHC 30.9 10/15/2021    RDW 13.9 10/15/2021    BANDSPCT 1 09/26/2014    LYMPHOPCT 13.0 10/12/2021    MONOPCT 8.0 10/12/2021    BASOPCT 0.6 10/12/2021    MONOSABS 0.78 10/12/2021    LYMPHSABS 1.28 10/12/2021    EOSABS 0.01 10/12/2021    BASOSABS 0.06 10/12/2021     CMP:    Lab Results   Component Value Date     10/15/2021    K 4.0 10/15/2021    K 4.6 10/12/2021     10/15/2021    CO2 20 10/15/2021    BUN 28 10/15/2021    CREATININE 1.4 10/15/2021    GFRAA 59 10/15/2021    LABGLOM 49 10/15/2021    GLUCOSE 183 10/15/2021    PROT 7.4 10/12/2021    LABALBU 3.4 10/12/2021    CALCIUM 9.1 10/15/2021    BILITOT 0.4 10/12/2021    ALKPHOS 110 10/12/2021    AST 89 10/12/2021    ALT 29 10/12/2021    C. Dif +    Assessment:    Patient Active Problem List   Diagnosis    HTN (hypertension)    Diabetes ([facility] [address]    Hyperlipidemia    Renal insufficiency    Osteoarthritis, knee    Acute kidney injury ([facility]    Hyponatremia    Metabolic acidosis    Acute respiratory distress   C.  Dif  A Fib    Plan:  Continue current care  Defer Afib RX to cardiology  Await 2DECHO results        Tyra Merlos MD  8:21 AM  10/15/2021

## 2021-10-16 LAB
ANION GAP SERPL CALCULATED.3IONS-SCNC: 13 MMOL/L (ref 7–16)
APTT: 31.9 SEC (ref 24.5–35.1)
APTT: 32.2 SEC (ref 24.5–35.1)
APTT: 42.8 SEC (ref 24.5–35.1)
BUN BLDV-MCNC: 24 MG/DL (ref 6–23)
CALCIUM SERPL-MCNC: 9.5 MG/DL (ref 8.6–10.2)
CHLORIDE BLD-SCNC: 98 MMOL/L (ref 98–107)
CO2: 21 MMOL/L (ref 22–29)
CREAT SERPL-MCNC: 1.3 MG/DL (ref 0.7–1.2)
GFR AFRICAN AMERICAN: >60
GFR NON-AFRICAN AMERICAN: 53 ML/MIN/1.73
GLUCOSE BLD-MCNC: 181 MG/DL (ref 74–99)
HCT VFR BLD CALC: 38.4 % (ref 37–54)
HEMOGLOBIN: 12.2 G/DL (ref 12.5–16.5)
MAGNESIUM: 2.3 MG/DL (ref 1.6–2.6)
MCH RBC QN AUTO: 26.3 PG (ref 26–35)
MCHC RBC AUTO-ENTMCNC: 31.8 % (ref 32–34.5)
MCV RBC AUTO: 82.9 FL (ref 80–99.9)
METER GLUCOSE: 129 MG/DL (ref 74–99)
METER GLUCOSE: 137 MG/DL (ref 74–99)
METER GLUCOSE: 152 MG/DL (ref 74–99)
METER GLUCOSE: 155 MG/DL (ref 74–99)
PDW BLD-RTO: 13.9 FL (ref 11.5–15)
PHOSPHORUS: 1.9 MG/DL (ref 2.5–4.5)
PLATELET # BLD: 239 E9/L (ref 130–450)
PMV BLD AUTO: 11.8 FL (ref 7–12)
POTASSIUM SERPL-SCNC: 3.7 MMOL/L (ref 3.5–5)
PRO-BNP: ABNORMAL PG/ML (ref 0–450)
RBC # BLD: 4.63 E12/L (ref 3.8–5.8)
SODIUM BLD-SCNC: 132 MMOL/L (ref 132–146)
WBC # BLD: 8.7 E9/L (ref 4.5–11.5)

## 2021-10-16 PROCEDURE — 82962 GLUCOSE BLOOD TEST: CPT

## 2021-10-16 PROCEDURE — 2060000000 HC ICU INTERMEDIATE R&B

## 2021-10-16 PROCEDURE — 85027 COMPLETE CBC AUTOMATED: CPT

## 2021-10-16 PROCEDURE — 84100 ASSAY OF PHOSPHORUS: CPT

## 2021-10-16 PROCEDURE — 6370000000 HC RX 637 (ALT 250 FOR IP): Performed by: NURSE PRACTITIONER

## 2021-10-16 PROCEDURE — 6360000002 HC RX W HCPCS: Performed by: NURSE PRACTITIONER

## 2021-10-16 PROCEDURE — 80048 BASIC METABOLIC PNL TOTAL CA: CPT

## 2021-10-16 PROCEDURE — 94640 AIRWAY INHALATION TREATMENT: CPT

## 2021-10-16 PROCEDURE — 6370000000 HC RX 637 (ALT 250 FOR IP): Performed by: INTERNAL MEDICINE

## 2021-10-16 PROCEDURE — 83880 ASSAY OF NATRIURETIC PEPTIDE: CPT

## 2021-10-16 PROCEDURE — 85730 THROMBOPLASTIN TIME PARTIAL: CPT

## 2021-10-16 PROCEDURE — 6360000002 HC RX W HCPCS: Performed by: INTERNAL MEDICINE

## 2021-10-16 PROCEDURE — 83735 ASSAY OF MAGNESIUM: CPT

## 2021-10-16 PROCEDURE — 6370000000 HC RX 637 (ALT 250 FOR IP): Performed by: FAMILY MEDICINE

## 2021-10-16 PROCEDURE — 36415 COLL VENOUS BLD VENIPUNCTURE: CPT

## 2021-10-16 RX ORDER — PANTOPRAZOLE SODIUM 40 MG/1
40 TABLET, DELAYED RELEASE ORAL
Status: DISCONTINUED | OUTPATIENT
Start: 2021-10-17 | End: 2021-10-25 | Stop reason: HOSPADM

## 2021-10-16 RX ADMIN — Medication 1 CAPSULE: at 09:19

## 2021-10-16 RX ADMIN — BENZONATATE 200 MG: 100 CAPSULE ORAL at 20:37

## 2021-10-16 RX ADMIN — METOPROLOL SUCCINATE 25 MG: 25 TABLET, EXTENDED RELEASE ORAL at 09:19

## 2021-10-16 RX ADMIN — BENZONATATE 200 MG: 100 CAPSULE ORAL at 13:57

## 2021-10-16 RX ADMIN — TEMAZEPAM 15 MG: 15 CAPSULE ORAL at 23:56

## 2021-10-16 RX ADMIN — HEPARIN SODIUM 11.8 UNITS/KG/HR: 10000 INJECTION, SOLUTION INTRAVENOUS at 13:53

## 2021-10-16 RX ADMIN — FIDAXOMICIN 200 MG: 200 TABLET, FILM COATED ORAL at 09:19

## 2021-10-16 RX ADMIN — INSULIN LISPRO 2 UNITS: 100 INJECTION, SOLUTION INTRAVENOUS; SUBCUTANEOUS at 13:51

## 2021-10-16 RX ADMIN — ROSUVASTATIN 10 MG: 10 TABLET, FILM COATED ORAL at 17:09

## 2021-10-16 RX ADMIN — FIDAXOMICIN 200 MG: 200 TABLET, FILM COATED ORAL at 20:38

## 2021-10-16 RX ADMIN — INSULIN LISPRO 2 UNITS: 100 INJECTION, SOLUTION INTRAVENOUS; SUBCUTANEOUS at 09:22

## 2021-10-16 RX ADMIN — AMLODIPINE BESYLATE 5 MG: 5 TABLET ORAL at 09:19

## 2021-10-16 RX ADMIN — BENZONATATE 200 MG: 100 CAPSULE ORAL at 09:19

## 2021-10-16 RX ADMIN — HEPARIN SODIUM 2000 UNITS: 1000 INJECTION INTRAVENOUS; SUBCUTANEOUS at 19:02

## 2021-10-16 RX ADMIN — HEPARIN SODIUM 11.8 UNITS/KG/HR: 10000 INJECTION, SOLUTION INTRAVENOUS at 15:17

## 2021-10-16 RX ADMIN — LEVALBUTEROL HYDROCHLORIDE 0.63 MG: 0.63 SOLUTION RESPIRATORY (INHALATION) at 10:33

## 2021-10-16 RX ADMIN — HEPARIN SODIUM 2000 UNITS: 1000 INJECTION INTRAVENOUS; SUBCUTANEOUS at 13:51

## 2021-10-16 RX ADMIN — LEVALBUTEROL HYDROCHLORIDE 0.63 MG: 0.63 SOLUTION RESPIRATORY (INHALATION) at 21:08

## 2021-10-16 ASSESSMENT — PAIN SCALES - GENERAL: PAINLEVEL_OUTOF10: 0

## 2021-10-16 NOTE — PROGRESS NOTES
Subjective: The patient is awake and alert. No problems overnight. Still coughing. No diarrhea. Denies chest pain, angina, and dyspnea. Denies abdominal pain. Tolerating diet. No nausea or vomiting. For cardiac cath Monday. Objective:    BP (!) 112/56   Pulse 97   Temp 97 °F (36.1 °C) (Temporal)   Resp 19   Ht 5' 7\" (1.702 m)   Wt 280 lb (127 kg)   SpO2 94%   BMI 43.85 kg/m²     Heart:  IRRR, no murmurs, gallops, or rubs.   Lungs:  CTA bilaterally, no wheeze, rales or rhonchi  Abd: bowel sounds present, nontender, nondistended, no masses  Extrem:  No clubbing, cyanosis, or edema    CBC with Differential:    Lab Results   Component Value Date    WBC 8.3 10/15/2021    RBC 4.55 10/15/2021    HGB 11.9 10/15/2021    HCT 37.0 10/15/2021     10/15/2021    MCV 81.3 10/15/2021    MCH 26.2 10/15/2021    MCHC 32.2 10/15/2021    RDW 13.9 10/15/2021    BANDSPCT 1 09/26/2014    LYMPHOPCT 13.0 10/12/2021    MONOPCT 8.0 10/12/2021    BASOPCT 0.6 10/12/2021    MONOSABS 0.78 10/12/2021    LYMPHSABS 1.28 10/12/2021    EOSABS 0.01 10/12/2021    BASOSABS 0.06 10/12/2021     CMP:    Lab Results   Component Value Date     10/15/2021    K 4.0 10/15/2021    K 4.6 10/12/2021     10/15/2021    CO2 20 10/15/2021    BUN 28 10/15/2021    CREATININE 1.4 10/15/2021    GFRAA 59 10/15/2021    LABGLOM 49 10/15/2021    GLUCOSE 183 10/15/2021    PROT 7.4 10/12/2021    LABALBU 3.4 10/12/2021    CALCIUM 9.1 10/15/2021    BILITOT 0.4 10/12/2021    ALKPHOS 110 10/12/2021    AST 89 10/12/2021    ALT 29 10/12/2021        Assessment:    Patient Active Problem List   Diagnosis    HTN (hypertension)    Diabetes ([facility] [address]    Hyperlipidemia    Renal insufficiency    Osteoarthritis, knee    Acute kidney injury ([facility] [address] [facility]    Hyponatremia    Metabolic acidosis    Acute respiratory distress   C.dif better  A Fib  Decreased EF    Plan:  Cardiac Cath Monday        Mamta Donaldson MD  5:49 AM  10/16/2021

## 2021-10-16 NOTE — PROGRESS NOTES
INPATIENT CARDIOLOGY Follow UP    Name: Shon Junior    Age: [de-identified] y.o. Date of Admission: 10/11/2021  7:11 PM    Date of Service: 10/15/2021      Referring Physician: Kiran Telles MD      Subjective: I discussed goal of care with patient. I also talked to patient's son-in-law (Dr. Ramona Pretty). Patient developed new onset atrial fibrillation today probably due to underlying unevaluated sleep apnea. He is going to be on anticoagulation. Patient has mild depression systolic function and mild global hypokinesis and will benefit from invasive coronary angiogram.  Dr. Essence Philippe prefer patient heart catheterization to be performed by Dr. Andreas Meigs and subsequently arrangement has been made and Dr. Andreas Meigs notified. [facility]) interventional cardiology also notified and agree with plan. Patient was updated with this plan and he report he will discussed the plan with his children as well who are also physicians. Past Medical History:  Past Medical History:   Diagnosis Date    Acute pancreatitis     Diabetes mellitus ([facility] [address]     History of snoring     HTN (hypertension)     Hyperlipidemia     Osteoarthritis     Preoperative clearance 8/29/2014    cardiac- Dr. Karma Feng; note in epic for surgery 9/23/2014    Preoperative clearance 9-19-14    medical clearance for OR 9-23-14 from Dr. Robin Strange on paper chart    Prostate cancer [facility]) 1999    radiation, seed implant    Renal insufficiency        Past Surgical History:  Past Surgical History:   Procedure Laterality Date    BELPHAROPTOSIS REPAIR Bilateral 01/23/2014    CATARACT REMOVAL WITH IMPLANT Right 10/2012    CATARACT REMOVAL WITH IMPLANT Left 11/2012    CHEST SURGERY  5/1985    benign lumpectomy    CHOLECYSTECTOMY  1995    KNEE SURGERY Left 1995    meniscus repair    KNEE SURGERY Right 03/1992    meniscus repair    SPINE SURGERY  2007    lower spine fx, disc repair, [facility].  Dr. Zak Dhaliwal       Family History:  Family History Problem Relation Age of Onset    Heart Disease Father          age 72       Social History:  Social History     Socioeconomic History    Marital status:      Spouse name: Not on file    Number of children: Not on file    Years of education: Not on file    Highest education level: Not on file   Occupational History    Not on file   Tobacco Use    Smoking status: Never Smoker    Smokeless tobacco: Never Used   Substance and Sexual Activity    Alcohol use: Yes     Comment: occasional    Drug use: No    Sexual activity: Not on file   Other Topics Concern    Not on file   Social History Narrative    Not on file     Social Determinants of Health     Financial Resource Strain:     Difficulty of Paying Living Expenses:    Food Insecurity:     Worried About Running Out of Food in the Last Year:     [address] in the Last Year:    Transportation Needs:     Lack of Transportation (Medical):  Lack of Transportation (Non-Medical):    Physical Activity:     Days of Exercise per Week:     Minutes of Exercise per Session:    Stress:     Feeling of Stress :    Social Connections:     Frequency of Communication with Friends and Family:     Frequency of Social Gatherings with Friends and Family:     Attends Baptism Services:     Active Member of Clubs or Organizations:     Attends Club or Organization Meetings:     Marital Status:    Intimate Partner Violence:     Fear of Current or Ex-Partner:     Emotionally Abused:     Physically Abused:     Sexually Abused: Allergies:  No Known Allergies    Home Medications:  Prior to Admission medications    Medication Sig Start Date End Date Taking?  Authorizing Provider   oxybutynin (DITROPAN) 5 MG tablet Take 5 mg by mouth daily   Yes Historical Provider, MD   atorvastatin (LIPITOR) 20 MG tablet Take 20 mg by mouth daily   Yes Historical Provider, MD   losartan (COZAAR) 50 MG tablet Take 50 mg by mouth daily   Yes Historical Provider, MD   fenofibrate (TRIGLIDE) 160 MG tablet Take 160 mg by mouth daily   Yes Historical Provider, MD   tamsulosin (FLOMAX) 0.4 MG capsule Take 0.4 mg by mouth nightly   Yes Historical Provider, MD   insulin detemir (LEVEMIR FLEXTOUCH) 100 UNIT/ML injection pen Inject 25 Units into the skin 2 times daily   Yes Historical Provider, MD   insulin aspart (NOVOLOG FLEXPEN) 100 UNIT/ML injection pen Inject 0-32 Units into the skin 3 times daily (before meals) *Per Sliding Scale*   Yes Historical Provider, MD   amLODIPine (NORVASC) 10 MG tablet Take 10 mg by mouth daily.  Instructed to take morning of surgery with a sip of water 7/31/14  Yes Historical Provider, MD       Current Medications:  Current Facility-Administered Medications   Medication Dose Route Frequency Provider Last Rate Last Admin    metoprolol succinate (TOPROL XL) extended release tablet 25 mg  25 mg Oral Daily Mariela Pantelis, APRN - CNP   25 mg at 10/15/21 1452    [START ON 10/16/2021] amLODIPine (NORVASC) tablet 5 mg  5 mg Oral Daily Mariela Pantelis, APRN - CNP        levalbuterol (XOPENEX) nebulization 0.63 mg  0.63 mg Nebulization TID Radha Diaz MD   0.63 mg at 10/15/21 1627    guaiFENesin-dextromethorphan (ROBITUSSIN DM) 100-10 MG/5ML syrup 5 mL  5 mL Oral Q4H PRN MD Paulina Casanovas Brown ON 10/16/2021] regadenoson (LEXISCAN) injection 0.4 mg  0.4 mg IntraVENous ONCE PRN Mariela Pantelis, APRN - CNP        heparin (porcine) injection 4,000 Units  4,000 Units IntraVENous PRN Mariela Pantelis, APRN - CNP        heparin (porcine) injection 2,000 Units  2,000 Units IntraVENous PRN Mariela Pantelis, APRN - CNP        heparin 25,000 units in dextrose 5% 250 mL (premix) infusion  7.8 Units/kg/hr IntraVENous Continuous Mariela Pantelis, APRN - CNP 9.9 mL/hr at 10/15/21 1647 7.8 Units/kg/hr at 10/15/21 1647    Fidaxomicin (DIFICID) tablet 200 mg  200 mg Oral BID Radha Diaz MD   200 mg at 10/15/21 2019    temazepam (RESTORIL) capsule 15 mg  15 mg Oral Nightly PRN Quique Escobar MD        lactobacillus (CULTURELLE) capsule 1 capsule  1 capsule Oral Daily Adan Fowler MD   1 capsule at 10/15/21 0850    racepinephrine HCl (VAPONEFPRIN) 2.25 % nebulizer solution NEBU 11.25 mg  11.25 mg Nebulization Q4H PRN Adan Fowler MD        sodium chloride nebulizer 0.9 % solution 3 mL  3 mL Nebulization Q4H PRN Adan Fowler MD        benzonatate (TESSALON) capsule 200 mg  200 mg Oral TID Adan Fowler MD   200 mg at 10/15/21 2019    sodium bicarbonate 50 mEq in dextrose 5 % 1,000 mL infusion   IntraVENous Continuous Palak Howard, APRN - CNP 50 mL/hr at 10/15/21 1345 Rate Change at 10/15/21 1345    insulin lispro (HUMALOG) injection vial 0-12 Units  0-12 Units SubCUTAneous TID WC Quique Escobar MD   4 Units at 10/15/21 1156    insulin lispro (HUMALOG) injection vial 0-6 Units  0-6 Units SubCUTAneous Nightly Quique Escobar MD   2 Units at 10/15/21 2019    glucose (GLUTOSE) 40 % oral gel 15 g  15 g Oral PRN Quique Escobar MD        dextrose 50 % IV solution  12.5 g IntraVENous PRN Quique Escobar MD        glucagon (rDNA) injection 1 mg  1 mg IntraMUSCular PRN Quique Escobar MD        dextrose 5 % solution  100 mL/hr IntraVENous PRN Quique Escobar MD        perflutren lipid microspheres (DEFINITY) injection 1.65 mg  1.5 mL IntraVENous ONCE PRN Adan Fowler MD        influenza quadrivalent split vaccine (FLUZONE;FLUARIX;FLULAVAL;AFLURIA) injection 0.5 mL  0.5 mL IntraMUSCular Prior to discharge Quique Escobar MD        rosuvastatin (CRESTOR) tablet 10 mg  10 mg Oral QPM Quique Escobar MD   10 mg at 10/15/21 1733    ondansetron (ZOFRAN-ODT) disintegrating tablet 4 mg  4 mg Oral Q8H PRN Quique Escobar MD        Or    ondansetron [facility]) injection 4 mg  4 mg IntraVENous Q6H PRN Quique Escobar MD        polyethylene glycol [facility]) packet 17 g  17 g Oral Daily PRSUMIT Escobar MD        acetaminophen (TYLENOL) tablet 650 mg  650 mg Oral Q6H PRN Quique Escobar, MD        Or    acetaminophen (TYLENOL) suppository 650 mg  650 mg Rectal Q6H PRN Grayson Foreman MD          heparin (PORCINE) Infusion 7.8 Units/kg/hr (10/15/21 1647)    sodium bicarbonate infusion 50 mL/hr at 10/15/21 1345    dextrose         Physical Exam:  BP (!) 112/56   Pulse 97   Temp 97 °F (36.1 °C) (Temporal)   Resp 19   Ht 5' 7\" (1.702 m)   Wt 280 lb (127 kg)   SpO2 94%   BMI 43.85 kg/m²   Wt Readings from Last 3 Encounters:   10/11/21 280 lb (127 kg)   09/18/14 265 lb (120.2 kg)   08/28/14 265 lb 8 oz (120.4 kg)       Appearance: Alert and oriented x3 not in acute distress. Skin: Dry skin  Head: Atraumatic  Eyes: Intact extraocular muscles   ENMT: Mucous membranes are moist  Neck: Supple  Lungs: Wheezes are are improving   Cardiac: Normal S1 and S2  Abdomen: Protuberant  Extremities: Intact range of motion  Neurologic: No focal neurological deficits  Peripheral Pulses: 2+ peripheral pulses    Intake/Output:    Intake/Output Summary (Last 24 hours) at 10/15/2021 2054  Last data filed at 10/15/2021 1826  Gross per 24 hour   Intake 1050 ml   Output 790 ml   Net 260 ml     I/O this shift:  In: 125 [P.O.:125]  Out: 320 [Urine:320]    Laboratory Tests:  Recent Labs     10/13/21  0730 10/14/21  0624 10/15/21  0620    133 133   K 4.3 3.7 4.0    100 100   CO2 15* 19* 20*   BUN 35* 32* 28*   CREATININE 2.0* 1.6* 1.4*   GLUCOSE 125* 203* 183*   CALCIUM 9.1 9.1 9.1     Lab Results   Component Value Date    MG 2.3 10/15/2021    MG 2.1 10/11/2021    MG 1.7 09/26/2014     No results for input(s): ALKPHOS, ALT, AST, PROT, BILITOT, BILIDIR, LABALBU in the last 72 hours.   Recent Labs     10/15/21  0620 10/15/21  1635   WBC 7.8 8.3   RBC 4.45 4.55   HGB 11.6* 11.9*   HCT 37.6 37.0   MCV 84.5 81.3   MCH 26.1 26.2   MCHC 30.9* 32.2   RDW 13.9 13.9    233   MPV 12.0 11.6     No results found for: CKTOTAL, CKMB, CKMBINDEX, TROPONINI  No results for input(s): CKTOTAL, CKMB, CKMBINDEX, TROPHS in the last 72 hours. Lab Results   Component Value Date    INR 2.2 09/26/2014    INR 1.8 09/25/2014    INR 1.2 09/24/2014    PROTIME 24.1 (H) 09/26/2014    PROTIME 19.9 (H) 09/25/2014    PROTIME 12.4 (H) 09/24/2014     No results found for: TSHFT4, TSH  Lab Results   Component Value Date    LABA1C 6.4 (H) 10/12/2021    LABA1C 7.7 (H) 09/24/2014     No results found for: EAG  Lab Results   Component Value Date    CHOL 97 10/13/2021     Lab Results   Component Value Date    TRIG 281 (H) 10/13/2021     Lab Results   Component Value Date    HDL 11 10/13/2021     Lab Results   Component Value Date    LDLCALC 30 10/13/2021     Lab Results   Component Value Date    LABVLDL 56 10/13/2021     No results found for: [facility]  Recent Labs     10/13/21  0730   PROBNP 11,016*       Cardiac Tests:      Echocardiogram reviewed: October 2021  Moderate asymmetric septal hypertrophy septal wall 1.6cm. Mild global LV hypokineses   Ejection fraction is visually estimated at 40 to 45%. Normal right ventricular size and function. Mildly enlarged right atrium size. Mild mitral regurgitation is present. The aortic valve appears mildly sclerotic. No hemodynamically significant aortic stenosis is present. Mild tricuspid regurgitation. Stress test reviewed:      Cardiac catheterization reviewed:     CXR reviewed: The ASCVD Risk score (Everette Chan et al., 2013) failed to calculate for the following reasons: The 2013 ASCVD risk score is only valid for ages 36 to 78    ASSESSMENT / PLAN:    1.  New onset atrial fibrillation  Continue anticoagulation and rate control  After invasive coronary angiogram consider cardioversion if patient prefers to be maintained in sinus rhythm and at that time perhaps amiodarone could be utilized. 2. Acute kidney injury [facility])  Improving  Nephrology on board    2.  Elevated troponin/intermittent dyspnea on exertion/cardiomyopathy  Echocardiogram revealed mildly depressed systolic function and mild global hypokinesis  Invasive coronary angiogram is being arranged as mentioned above and patient son-in-law Dr. rTinity Valdivia preferred Dr. Lilliana Wayne to perform the procedure and subsequently Dr. Lilliana Wayne and nursing staff identified. Interventional cardiology for [facility]) also identified and agree with plan. Patient reports he will discussed the plan with his children who are also physicians    Regarding his new onset cardiomyopathy if invasive coronary angiogram is unrevealing consider elective cardiac MRI to rule out infiltrative cardiomyopathy. 3.  Elevated BNP  Patient denies orthopnea or shortness of breath while laying flat  I tried of diuretic worsening the kidney function and subsequently diuresis was discontinued and patient doing fine. 4.  Diarrhea  From C. Difficile  Management per primary service    5. Hypertension    6. Diabetes    7. Obesity    8. Suspected sleep apnea  Pulmonology on board and appreciate your input    9. Jeremías Ribeiro [address] cardiology will sign off for now. Please call with questions. Thank you for allowing me to participate in your patient's care. Please feel free to contact me if you have any questions or concerns.     Cam Gatica MD  [facility] ([facility]) Cardiology

## 2021-10-16 NOTE — PROGRESS NOTES
[facility]  Nephrology Attending Progress Note          SUBJECTIVE:   10/16/21- awake and alert. Feeling better, no complaints, wife present. PROBLEM LIST:    Patient Active Problem List   Diagnosis    HTN (hypertension)    Diabetes ([facility] [address]    Hyperlipidemia    Renal insufficiency    Osteoarthritis, knee    Acute kidney injury ([facility] [address]    Hyponatremia    Metabolic acidosis    Acute respiratory distress        PAST MEDICAL HISTORY:    Past Medical History:   Diagnosis Date    Acute pancreatitis     Diabetes mellitus ([facility] [address]     History of snoring     HTN (hypertension)     Hyperlipidemia     Osteoarthritis     Preoperative clearance 8/29/2014    cardiac- Dr. Tova Arshad; note in epic for surgery 9/23/2014    Preoperative clearance 9-19-14    medical clearance for OR 9-23-14 from Dr. Hemant Acosta on paper chart    Prostate cancer [facility]) 1999    radiation, seed implant    Renal insufficiency        DIET:    ADULT DIET;  Regular     PHYSICAL EXAM:     Patient Vitals for the past 24 hrs:   BP Temp Temp src Pulse Resp SpO2   10/16/21 1033 -- -- -- -- -- 93 %   10/16/21 0755 124/65 96.7 °F (35.9 °C) Temporal 64 18 95 %   10/16/21 0600 107/76 96.9 °F (36.1 °C) Temporal 71 17 94 %   10/15/21 2000 (!) 112/56 97 °F (36.1 °C) Temporal 97 19 94 %   10/15/21 1545 125/70 97 °F (36.1 °C) Temporal 74 16 95 %   @      Intake/Output Summary (Last 24 hours) at 10/16/2021 1402  Last data filed at 10/15/2021 1826  Gross per 24 hour   Intake 850 ml   Output 420 ml   Net 430 ml         Wt Readings from Last 3 Encounters:   10/11/21 280 lb (127 kg)   09/18/14 265 lb (120.2 kg)   08/28/14 265 lb 8 oz (120.4 kg)       General appearance: alert, in no acute distress  Skin: No rashes or lesions on exposed skin  Neck: No JVD  Lungs: Clear, no adventitious sounds  Heart: RRR, no rub  Abdomen: Soft, non-tender, + bowel sounds  Extremities: BLE edema  Neurologic: Mental status: Alert, oriented, thought content appropriate        MEDS (scheduled):    [START ON 10/17/2021] pantoprazole  40 mg Oral QAM AC    metoprolol succinate  25 mg Oral Daily    amLODIPine  5 mg Oral Daily    levalbuterol  0.63 mg Nebulization TID    Fidaxomicin  200 mg Oral BID    lactobacillus  1 capsule Oral Daily    benzonatate  200 mg Oral TID    insulin lispro  0-12 Units SubCUTAneous TID WC    insulin lispro  0-6 Units SubCUTAneous Nightly    influenza virus vaccine  0.5 mL IntraMUSCular Prior to discharge    rosuvastatin  10 mg Oral QPM       MEDS (infusions):   heparin (PORCINE) Infusion 11.8 Units/kg/hr (10/16/21 1353)    sodium bicarbonate infusion 50 mL/hr at 10/15/21 1345    dextrose         MEDS (prn):  guaiFENesin-dextromethorphan, regadenoson, heparin (porcine), heparin (porcine), temazepam, racepinephrine HCl, sodium chloride nebulizer, glucose, dextrose, glucagon (rDNA), dextrose, perflutren lipid microspheres, ondansetron **OR** ondansetron, polyethylene glycol, acetaminophen **OR** acetaminophen    DATA:    Recent Labs     10/15/21  0620 10/15/21  1635 10/16/21  1157   WBC 7.8 8.3 8.7   HGB 11.6* 11.9* 12.2*   HCT 37.6 37.0 38.4   MCV 84.5 81.3 82.9    233 239     Recent Labs     10/14/21  0624 10/15/21  0620 10/16/21  1157    133 132   K 3.7 4.0 3.7    100 98   CO2 19* 20* 21*   BUN 32* 28* 24*   CREATININE 1.6* 1.4* 1.3*   LABGLOM 42 49 53   GLUCOSE 203* 183* 181*   CALCIUM 9.1 9.1 9.5   MG  --  2.3 2.3   PHOS  --  2.2* 1.9*       Lab Results   Component Value Date    LABALBU 3.4 (L) 10/12/2021    LABALBU 3.5 10/11/2021    LABALBU 3.5 09/25/2014    LABALBU 2.8 (L) 09/25/2014     No results found for: TSH    Iron Studies  Lab Results   Component Value Date    IRON 9 (L) 09/25/2014    TIBC 334 09/25/2014    FERRITIN 205 09/25/2014     Vitamin B-12   Date Value Ref Range Status   09/25/2014 225 211 - 946 pg/mL Final     Folate   Date Value Ref Range Status   09/25/2014 13.0 7.3 - 26.1 ng/mL Final       Vit D, 25-Hydroxy   Date Value Ref Range Status   09/25/2014 9 (L) 30 - 100 ng/mL Final     Comment:     <20 ng/mL. ........... Luis Armando Punt Deficient  20-30 ng/mL. ......... Luis Armando Punt Insufficient   ng/mL. ........ Luis Armando Punt Sufficient  >100 ng/mL. .......... Luis Armando Punt Toxic       PTH   Date Value Ref Range Status   09/25/2014 42 15 - 65 pg/mL Final       No components found for: URIC    Lab Results   Component Value Date    COLORU Yellow 10/12/2021    NITRU Negative 10/12/2021    GLUCOSEU Negative 10/12/2021    KETUA Negative 10/12/2021    UROBILINOGEN 0.2 10/12/2021    BILIRUBINUR SMALL 10/12/2021       No results found for: LIPIDPAN      IMPRESSION/RECOMMENDATIONS:         1. Hyponatremia  Na 129-->133-->132     2. SAURABH  Baseline from 2014 of 1.5  Cr now 2.9>2.6.->2.0-->1.6-->1.4 -->1.3 with ivf  No hydro on shirley  Check daily bmp  Holding cozaar  Sp bactrim last week     3. Shortness of breath-  Pulmonology following also     4. Hypertension-  Controlled at goal<130/80  Off  cozaar  On norvasc     5. Metabolic acidosis  In setting of ckd  Improving on IVF with [id number]     Patient seen and examined all key components of the physical performed independently , case discussed with NP, all pertinent labs and radiologic tests personally reviewed agree with above.       Kami Rodriguez MD

## 2021-10-16 NOTE — PROGRESS NOTES
Gabe Navarrete M.D.,[facility]  Lory Duran D.O., F.CANDACE.[email or web address]., Vernon Xavier M.D. Cristiano Reyes M.D. Medardo Velasco D.O. Daily Pulmonary Progress Note    Patient:  Murray Harvey [de-identified] y.o. male MRN: [id number]     Date of Service: 10/16/2021      Synopsis     We are following patient for cough/wheezing    \"CC\" cough    Code status: full code      Subjective      Patient was seen and examined sitting in a chair in no apparent distress. He is still coughing with occasional tan thick sputum. He says he has a wheeze when he lays down. He is still on heparin gtt. He is for heart cath on Monday. He has a harsh cough and sounds coarse on auscultation. He remains on fidaxomycin for cdiff. Review of Systems:  Constitutional: Denies fever, weight loss, night sweats, and fatigue  Skin: Denies pigmentation, dark lesions, and rashes   HEENT: Denies hearing loss, tinnitus, ear drainage, epistaxis, sore throat, and hoarseness. Cardiovascular: Denies palpitations, chest pain, and chest pressure. Respiratory: + cough, -dyspnea at rest, -hemoptysis, apnea, and choking.   Gastrointestinal: Denies nausea, vomiting, poor appetite, diarrhea, heartburn or reflux  Genitourinary: Denies dysuria, frequency, urgency or hematuria  Musculoskeletal: Denies myalgias, muscle weakness, and bone pain  Neurological: Denies dizziness, vertigo, headache, and focal weakness  Psychological: Denies anxiety and depression  Endocrine: Denies heat intolerance and cold intolerance  Hematopoietic/Lymphatic: Denies bleeding problems and blood transfusions    24-hour events:  none    Objective   Vitals: /65   Pulse 64   Temp 96.7 °F (35.9 °C) (Temporal)   Resp 18   Ht 5' 7\" (1.702 m)   Wt 280 lb (127 kg)   SpO2 93%   BMI 43.85 kg/m²     I/O:    Intake/Output Summary (Last 24 hours) at 10/16/2021 1050  Last data filed at 10/15/2021 1826  Gross per 24 hour   Intake 850 ml   Output 540 ml   Net 310 ml       Vent Information  SpO2: 93 %                CURRENT MEDS :  Scheduled Meds:   metoprolol succinate  25 mg Oral Daily    amLODIPine  5 mg Oral Daily    levalbuterol  0.63 mg Nebulization TID    Fidaxomicin  200 mg Oral BID    lactobacillus  1 capsule Oral Daily    benzonatate  200 mg Oral TID    insulin lispro  0-12 Units SubCUTAneous TID     insulin lispro  0-6 Units SubCUTAneous Nightly    influenza virus vaccine  0.5 mL IntraMUSCular Prior to discharge    rosuvastatin  10 mg Oral QPM       Physical Exam:  General Appearance: appears comfortable in no acute distress. HEENT: Normocephalic atraumatic without obvious abnormality   Neck: Lips, mucosa, and tongue normal.  Supple, symmetrical, trachea midline, no adenopathy;thyroid:  no enlargement/tenderness/nodules or JVD. Lung: Breath sounds CTA. Respirations   unlabored. Symmetrical expansion. Heart: RRR, normal S1, S2. No MRG  Abdomen: Soft, NT, ND. BS present x 4 quadrants. No bruit or organomegaly. Extremities: Pedal pulses 2+ symmetric b/l. Extremities normal, no cyanosis, clubbing, or edema. Musculokeletal: No joint swelling, no muscle tenderness. ROM normal in all joints of extremities. Neurologic: Mental status: Alert and Oriented X3 . Pertinent/ New Labs and Imaging Studies     Imaging Personally Reviewed:  CXR      Impression   Normal chest         VQ      Very low probability for pulmonary embolism.             DVT       No evidence of DVT in either lower extremity.             US kidneys      No hydronephrosis.  No renal calculus identified       Simple appearing cysts in bilateral kidneys.             Echo   Summary   Moderate asymmetric septal hypertrophy septal wall 1.6cm.    Mild global LV hypokineses   Ejection fraction is visually estimated at 40 to 45%.   Normal right ventricular size and function.   Mildly enlarged right atrium size.   Mild mitral regurgitation is present.   The aortic valve appears mildly sclerotic.   No hemodynamically significant aortic stenosis is present.   Mild tricuspid regurgitation. Labs:  Lab Results   Component Value Date    WBC 8.3 10/15/2021    HGB 11.9 10/15/2021    HCT 37.0 10/15/2021    MCV 81.3 10/15/2021    MCH 26.2 10/15/2021    MCHC 32.2 10/15/2021    RDW 13.9 10/15/2021     10/15/2021    MPV 11.6 10/15/2021     Lab Results   Component Value Date     10/15/2021    K 4.0 10/15/2021    K 4.6 10/12/2021     10/15/2021    CO2 20 10/15/2021    BUN 28 10/15/2021    CREATININE 1.4 10/15/2021    LABALBU 3.4 10/12/2021    CALCIUM 9.1 10/15/2021    GFRAA 59 10/15/2021    LABGLOM 49 10/15/2021     Lab Results   Component Value Date    PROTIME 24.1 09/26/2014    INR 2.2 09/26/2014     No results for input(s): PROBNP in the last 72 hours. No results for input(s): PROCAL in the last 72 hours. This SmartLink has not been configured with any valid records. Micro:  No results for input(s): CULTRESP in the last 72 hours. No results for input(s): LABGRAM in the last 72 hours. No results for input(s): LEGUR in the last 72 hours. Recent Labs     10/15/21  0940   STREPNEUMAGU Presumptive negative- suggests no current or recent  pneumococcal infection. Infection due to Strep pneumoniae cannot be  ruled out since the antigen present in the sample  may be below the detection limit of the test.  Normal Range:Presumptive Negative       Recent Labs     10/15/21  0940   LP1UAG Presumptive Negative -suggesting no recent or current infections  with Legionella pneumophila serogroup 1. Infection to Legionella cannot be ruled out since other serogroups  and species may cause infection, antigen may not be present in  early infection, or level of antigen may be below the  detection limit. Normal Range: Presumptive Negative            Assessment:    1. Generalized weakness and fatigue  2. Elevated D dimer   3. Intermittent dyspnea on exertion  4. Acute on chronic kidney disease, improving   5. Dehydration  6. Diabetes  7. Prostate cancer s/p radiation  8. Obesity  9. Cough and wheezing likely secondary to URI  10. Diarrhea secondary to C diff colitis   11. Recent UTI for which he received bactrim   12. Atrial fibrillation  13. Cardiomyopathy with EF 40%      Plan:   1. Continue bronchodilators but change to xopenex  2. Echocardiogram and EKG reviewed -plans for heart cath on Monday  3. Fidaxomicin 200mg BID x 10 days for C diff  4. Sodium bicarb infusion per renal service: continues at lower rate  5. Hold off on inpatient sleep study until he is feeling better, however, I would definitely recommend this outpatient  6. Culture sputum  7. proBNP 11,016 on 10/13, repeat today  8. Add Protonix for GI prophylaxis    This plan of care was reviewed in collaboration with Dr. Lorie Dunn  Electronically signed by CHOCO Mccollum CNP on 10/16/2021 at 10:50 AM      Addendum:  I have personally seen and examined patient. He feels much better today. Has had no further episodes of diarrhea. Still has a cough. We will check a respiratory culture. Awaiting input from Dr. Pranav Muniz. I personally saw, examined, and cared for the patient. Labs, medications, radiographs reviewed. I agree with history exam and plans detailed in NP note.   Kelsie Zaragoza MD

## 2021-10-17 LAB
ANION GAP SERPL CALCULATED.3IONS-SCNC: 13 MMOL/L (ref 7–16)
APTT: 125.5 SEC (ref 24.5–35.1)
APTT: 41.8 SEC (ref 24.5–35.1)
APTT: 56.3 SEC (ref 24.5–35.1)
BUN BLDV-MCNC: 21 MG/DL (ref 6–23)
CALCIUM SERPL-MCNC: 9.7 MG/DL (ref 8.6–10.2)
CHLORIDE BLD-SCNC: 98 MMOL/L (ref 98–107)
CO2: 22 MMOL/L (ref 22–29)
CREAT SERPL-MCNC: 1.3 MG/DL (ref 0.7–1.2)
GFR AFRICAN AMERICAN: >60
GFR NON-AFRICAN AMERICAN: 53 ML/MIN/1.73
GLUCOSE BLD-MCNC: 170 MG/DL (ref 74–99)
HCT VFR BLD CALC: 38 % (ref 37–54)
HEMOGLOBIN: 12 G/DL (ref 12.5–16.5)
MCH RBC QN AUTO: 26.5 PG (ref 26–35)
MCHC RBC AUTO-ENTMCNC: 31.6 % (ref 32–34.5)
MCV RBC AUTO: 84.1 FL (ref 80–99.9)
METER GLUCOSE: 140 MG/DL (ref 74–99)
METER GLUCOSE: 148 MG/DL (ref 74–99)
METER GLUCOSE: 149 MG/DL (ref 74–99)
METER GLUCOSE: 153 MG/DL (ref 74–99)
PDW BLD-RTO: 14.1 FL (ref 11.5–15)
PLATELET # BLD: 247 E9/L (ref 130–450)
PMV BLD AUTO: 12.8 FL (ref 7–12)
POTASSIUM SERPL-SCNC: 3.8 MMOL/L (ref 3.5–5)
RBC # BLD: 4.52 E12/L (ref 3.8–5.8)
SODIUM BLD-SCNC: 133 MMOL/L (ref 132–146)
VITAMIN D 25-HYDROXY: 12 NG/ML (ref 30–100)
WBC # BLD: 10 E9/L (ref 4.5–11.5)

## 2021-10-17 PROCEDURE — 6360000002 HC RX W HCPCS: Performed by: NURSE PRACTITIONER

## 2021-10-17 PROCEDURE — 80048 BASIC METABOLIC PNL TOTAL CA: CPT

## 2021-10-17 PROCEDURE — 6370000000 HC RX 637 (ALT 250 FOR IP): Performed by: NURSE PRACTITIONER

## 2021-10-17 PROCEDURE — 82306 VITAMIN D 25 HYDROXY: CPT

## 2021-10-17 PROCEDURE — 2060000000 HC ICU INTERMEDIATE R&B

## 2021-10-17 PROCEDURE — 6370000000 HC RX 637 (ALT 250 FOR IP): Performed by: INTERNAL MEDICINE

## 2021-10-17 PROCEDURE — 36415 COLL VENOUS BLD VENIPUNCTURE: CPT

## 2021-10-17 PROCEDURE — 87206 SMEAR FLUORESCENT/ACID STAI: CPT

## 2021-10-17 PROCEDURE — 94640 AIRWAY INHALATION TREATMENT: CPT

## 2021-10-17 PROCEDURE — 2580000003 HC RX 258: Performed by: NURSE PRACTITIONER

## 2021-10-17 PROCEDURE — 2500000003 HC RX 250 WO HCPCS: Performed by: NURSE PRACTITIONER

## 2021-10-17 PROCEDURE — 6360000002 HC RX W HCPCS: Performed by: INTERNAL MEDICINE

## 2021-10-17 PROCEDURE — 82962 GLUCOSE BLOOD TEST: CPT

## 2021-10-17 PROCEDURE — 85027 COMPLETE CBC AUTOMATED: CPT

## 2021-10-17 PROCEDURE — 6370000000 HC RX 637 (ALT 250 FOR IP): Performed by: FAMILY MEDICINE

## 2021-10-17 PROCEDURE — 87070 CULTURE OTHR SPECIMN AEROBIC: CPT

## 2021-10-17 PROCEDURE — 2580000003 HC RX 258: Performed by: INTERNAL MEDICINE

## 2021-10-17 PROCEDURE — 85730 THROMBOPLASTIN TIME PARTIAL: CPT

## 2021-10-17 RX ORDER — SODIUM CHLORIDE 0.9 % (FLUSH) 0.9 %
5-40 SYRINGE (ML) INJECTION EVERY 12 HOURS SCHEDULED
Status: DISCONTINUED | OUTPATIENT
Start: 2021-10-17 | End: 2021-10-25 | Stop reason: HOSPADM

## 2021-10-17 RX ORDER — SODIUM CHLORIDE 9 MG/ML
25 INJECTION, SOLUTION INTRAVENOUS PRN
Status: DISCONTINUED | OUTPATIENT
Start: 2021-10-17 | End: 2021-10-25 | Stop reason: HOSPADM

## 2021-10-17 RX ORDER — SODIUM CHLORIDE 9 MG/ML
INJECTION, SOLUTION INTRAVENOUS CONTINUOUS
Status: DISCONTINUED | OUTPATIENT
Start: 2021-10-17 | End: 2021-10-19

## 2021-10-17 RX ORDER — SODIUM CHLORIDE 0.9 % (FLUSH) 0.9 %
5-40 SYRINGE (ML) INJECTION PRN
Status: DISCONTINUED | OUTPATIENT
Start: 2021-10-17 | End: 2021-10-25 | Stop reason: HOSPADM

## 2021-10-17 RX ADMIN — SODIUM BICARBONATE: 84 INJECTION, SOLUTION INTRAVENOUS at 13:19

## 2021-10-17 RX ADMIN — INSULIN LISPRO 2 UNITS: 100 INJECTION, SOLUTION INTRAVENOUS; SUBCUTANEOUS at 16:57

## 2021-10-17 RX ADMIN — SODIUM CHLORIDE: 9 INJECTION, SOLUTION INTRAVENOUS at 22:06

## 2021-10-17 RX ADMIN — FIDAXOMICIN 200 MG: 200 TABLET, FILM COATED ORAL at 09:29

## 2021-10-17 RX ADMIN — LEVALBUTEROL HYDROCHLORIDE 0.63 MG: 0.63 SOLUTION RESPIRATORY (INHALATION) at 10:40

## 2021-10-17 RX ADMIN — INSULIN LISPRO 2 UNITS: 100 INJECTION, SOLUTION INTRAVENOUS; SUBCUTANEOUS at 11:39

## 2021-10-17 RX ADMIN — METOPROLOL SUCCINATE 25 MG: 25 TABLET, EXTENDED RELEASE ORAL at 09:31

## 2021-10-17 RX ADMIN — ROSUVASTATIN 10 MG: 10 TABLET, FILM COATED ORAL at 16:58

## 2021-10-17 RX ADMIN — INSULIN LISPRO 2 UNITS: 100 INJECTION, SOLUTION INTRAVENOUS; SUBCUTANEOUS at 09:30

## 2021-10-17 RX ADMIN — HEPARIN SODIUM 2000 UNITS: 1000 INJECTION INTRAVENOUS; SUBCUTANEOUS at 13:15

## 2021-10-17 RX ADMIN — Medication 1 CAPSULE: at 09:30

## 2021-10-17 RX ADMIN — FIDAXOMICIN 200 MG: 200 TABLET, FILM COATED ORAL at 22:03

## 2021-10-17 RX ADMIN — INSULIN LISPRO 1 UNITS: 100 INJECTION, SOLUTION INTRAVENOUS; SUBCUTANEOUS at 22:03

## 2021-10-17 RX ADMIN — LEVALBUTEROL HYDROCHLORIDE 0.63 MG: 0.63 SOLUTION RESPIRATORY (INHALATION) at 14:29

## 2021-10-17 RX ADMIN — BENZONATATE 200 MG: 100 CAPSULE ORAL at 22:03

## 2021-10-17 RX ADMIN — HEPARIN SODIUM 12.8 UNITS/KG/HR: 10000 INJECTION, SOLUTION INTRAVENOUS at 23:05

## 2021-10-17 RX ADMIN — Medication 5 ML: at 22:07

## 2021-10-17 RX ADMIN — BENZONATATE 200 MG: 100 CAPSULE ORAL at 09:29

## 2021-10-17 RX ADMIN — BENZONATATE 200 MG: 100 CAPSULE ORAL at 13:47

## 2021-10-17 RX ADMIN — PANTOPRAZOLE SODIUM 40 MG: 40 TABLET, DELAYED RELEASE ORAL at 06:37

## 2021-10-17 RX ADMIN — AMLODIPINE BESYLATE 5 MG: 5 TABLET ORAL at 09:29

## 2021-10-17 ASSESSMENT — PAIN SCALES - GENERAL
PAINLEVEL_OUTOF10: 0
PAINLEVEL_OUTOF10: 0

## 2021-10-17 NOTE — PLAN OF CARE
Problem: Falls - Risk of:  Goal: Will remain free from falls  Description: Will remain free from falls  10/17/2021 1217 by Radha Esteves RN  Outcome: Met This Shift  10/17/2021 0216 by Sergei Hinkle RN  Outcome: Met This Shift  Goal: Absence of physical injury  Description: Absence of physical injury  10/17/2021 1217 by Radha Esteves RN  Outcome: Met This Shift  10/17/2021 0216 by Sergei Hinkle RN  Outcome: Met This Shift     Problem: Musculor/Skeletal Functional Status  Goal: Highest potential functional level  10/17/2021 1217 by Radha Esteves RN  Outcome: Met This Shift  10/17/2021 0216 by Sergei Hinkle RN  Outcome: Ongoing  Goal: Absence of falls  10/17/2021 1217 by Radha Esteves RN  Outcome: Met This Shift  10/17/2021 0216 by Sergei Hinkle RN  Outcome: Met This Shift     Problem: Pain:  Goal: Pain level will decrease  Description: Pain level will decrease  10/17/2021 1217 by Radha Esteves RN  Outcome: Met This Shift  10/17/2021 0216 by Sergei Hinkle RN  Outcome: Met This Shift  Goal: Control of acute pain  Description: Control of acute pain  10/17/2021 0216 by Sergei Hinkle RN  Outcome: Met This Shift  Goal: Control of chronic pain  Description: Control of chronic pain  10/17/2021 1217 by Radha Esteves RN  Outcome: Met This Shift  10/17/2021 0216 by Sergei Hinkle RN  Outcome: Met This Shift     Problem: Skin Integrity:  Goal: Will show no infection signs and symptoms  Description: Will show no infection signs and symptoms  10/17/2021 1217 by Radha Esteves RN  Outcome: Met This Shift  10/17/2021 0216 by Sergei Hinkle RN  Outcome: Met This Shift  Goal: Absence of new skin breakdown  Description: Absence of new skin breakdown  10/17/2021 0216 by Sergei Hinkle RN  Outcome: Met This Shift

## 2021-10-17 NOTE — PLAN OF CARE
Problem: Falls - Risk of:  Goal: Will remain free from falls  Description: Will remain free from falls  Outcome: Met This Shift  Goal: Absence of physical injury  Description: Absence of physical injury  Outcome: Met This Shift     Problem: Musculor/Skeletal Functional Status  Goal: Highest potential functional level  Outcome: Ongoing  Goal: Absence of falls  Outcome: Met This Shift     Problem: Pain:  Goal: Pain level will decrease  Description: Pain level will decrease  Outcome: Met This Shift  Goal: Control of acute pain  Description: Control of acute pain  Outcome: Met This Shift  Goal: Control of chronic pain  Description: Control of chronic pain  Outcome: Met This Shift     Problem: Skin Integrity:  Goal: Will show no infection signs and symptoms  Description: Will show no infection signs and symptoms  Outcome: Met This Shift  Goal: Absence of new skin breakdown  Description: Absence of new skin breakdown  Outcome: Met This Shift

## 2021-10-17 NOTE — CONSULTS
[facility]  Second Opinion    Patient Name:  Tara August    :  1940    Reason for Consultation:   Suspected coronary artery disease and heart failure    History of Present Illness:   Tara August presents to [facility], following a 4-week history of progressive weakness and more recently a recurrent cough and shortness of breath. He denies any previous history of heart disease but following admission his proBNP was >7000 pg/mL and troponin at bedtime 50-  respectively. Likewise he had a significantly elevated plasma D-dimer but subsequent noninvasive studies including a VQ scan and lower extremity Doppler were negative for pulmonary embolism. Subsequently a two-dimensional echocardiogram had been obtained which did not demonstrate evidence for pulmonary hypertension but did suggest global left ventricular systolic dysfunction with an estimated left ventricular ejection fraction of 40%. He denied any chest heaviness nor jaw arm or intrascapular discomfort at rest or with exertion at any time during the past 1 month nor has he experienced palpitations or sudden lightheaded spells. .  He notes that he underwent a nuclear stress test in  when he underwent lumbar surgical intervention at [facility]. He does have multiple risk factors for coronary artery disease. Likewise he has a longstanding history of chronic renal disease. Finally, following his most recent urinary tract infection for which she was placed on Bactrim he is now developed C. difficile. For this reason it was recommended that he undergo diagnostic cardiac catheterization fully realizing his underlying renal dysfunction as well. At the request of his family I was asked to render a second opinion at this time.     Past Medical History:   has a past medical history of Acute pancreatitis, Diabetes mellitus ([address] History of snoring, HTN (hypertension), Hyperlipidemia, Osteoarthritis, Preoperative clearance, Preoperative clearance, Prostate cancer ([facility] [address] and Renal insufficiency. Surgical History:   has a past surgical history that includes Cholecystectomy (); Spine surgery (); Chest surgery (1985); Cataract removal with implant (Right, 10/2012); Cataract removal with implant (Left, 2012); Blepharoptosis Repair (Bilateral, 2014); knee surgery (Left, ); and knee surgery (Right, 1992). Social History:   reports that he has never smoked. He has never used smokeless tobacco. He reports current alcohol use. He reports that he does not use drugs. Family History:  family history includes Heart Disease in his father. Father  at age 72 secondary to undergoing heart surgery by Dr. Jonas Newman at [facility] for both valvular and coronary artery disease and mother  secondary to underlying carcinoma. Medications:  Prior to Admission medications    Medication Sig Start Date End Date Taking? Authorizing Provider   oxybutynin (DITROPAN) 5 MG tablet Take 5 mg by mouth daily   Yes Historical Provider, MD   atorvastatin (LIPITOR) 20 MG tablet Take 20 mg by mouth daily   Yes Historical Provider, MD   losartan (COZAAR) 50 MG tablet Take 50 mg by mouth daily   Yes Historical Provider, MD   fenofibrate (TRIGLIDE) 160 MG tablet Take 160 mg by mouth daily   Yes Historical Provider, MD   tamsulosin (FLOMAX) 0.4 MG capsule Take 0.4 mg by mouth nightly   Yes Historical Provider, MD   insulin detemir (LEVEMIR FLEXTOUCH) 100 UNIT/ML injection pen Inject 25 Units into the skin 2 times daily   Yes Historical Provider, MD   insulin aspart (NOVOLOG FLEXPEN) 100 UNIT/ML injection pen Inject 0-32 Units into the skin 3 times daily (before meals) *Per Sliding Scale*   Yes Historical Provider, MD   amLODIPine (NORVASC) 10 MG tablet Take 10 mg by mouth daily.  Instructed to take morning of surgery with a sip of water 14  Yes Historical Provider, MD       Allergies:  Patient has no known allergies. Review of Systems:   · Constitutional: there has been no unanticipated weight loss. There's been no significant change in energy level, sleep pattern or activity level. No fever chills or rigors. · Eyes: No visual changes or diplopia. No scleral icterus. · ENT: No Headaches, hearing loss or vertigo. No mouth sores or sore throat. No change in taste or smell. · Cardiovascular: No chest discomfort, dyspnea on exertion, palpitations, loss of consciousness, no phlebitis, no claudication. · Respiratory: No cough or wheezing, no sputum production. No hemoptysis, pleuritic pain. · Gastrointestinal: No abdominal pain, appetite loss, blood in stools. No change in bowel habits. No hematemesis  · Genitourinary: No dysuria, trouble voiding or hematuria. No nocturia or increased frequency. · Musculoskeletal:  No gait disturbance, weakness or joint complaints. · Integumentary: No rash or pruritis. · Neurological: No headache, diplopia, change in muscle strength, numbness or tingling. No change in gait, balance, coordination, mood, affect, memory, mentation, behavior. · Psychiatric: No anxiety or depression. · Endocrine: No temperature intolerance. No excessive thirst, fluid intake, or urination. No tremor. · Hematologic/Lymphatic: No abnormal bruising or bleeding, blood clots or swollen lymph nodes. · Allergic/Immunologic: No nasal congestion or hives. Physical Examination:    Vital Signs: BP (!) 149/81   Pulse 69   Temp 98 °F (36.7 °C) (Temporal)   Resp 18   Ht 5' 7\" (1.702 m)   Wt 280 lb (127 kg)   SpO2 97%   BMI 43.85 kg/m²   General appearance: Well preserved, mesomorphic body habitus, alert, no distress. Skin: Skin color, texture, turgor normal. No rashes or lesions. No induration or tightening palpated. Head: Normocephalic. No masses, lesions, tenderness or abnormalities  Eyes: Conjunctivae/corneas clear. PERRL, EOMs intact. Sclera non icteric.   Ears: External ears normal. Canals clear. TM's clear bilaterally. Hearing normal to finger rub. Nose/Sinuses: Nares normal. Septum midline. Mucosa normal. No drainage or sinus tenderness. Oropharynx: Lips, mucosa, and tongue normal. Oropharynx clear with no exudate seen. Neck: Neck supple and symmetric. No adenopathy. Thyroid symmetric, normal size, without nodules. Trachea is midline. Carotids brisk in upstroke without bruits, no abnormal JVP noted at 45°. Chest: Even excursion  Lungs: Lungs with few expiratory rhonchi to auscultation bilaterally. No retractions or use of accessory muscles. No tactile vocal fremitus. No late inspiratory crackles or rales. Heart:  S1 > S2. Heart sounds somewhat distant regular rhythm. No gallop or murmur. No rub, palpable thrill or heave noted. PMI 5th intercostal space midclavicular line. Abdomen: Abdomen soft, grossly protuberant, non-tender. BS normal. No masses, organomegaly. No hernia noted. Extremities: Extremities normal. No deformities, edema, or skin discoloration. No cyanosis or clubbing noted to the nails. Peripheral pulses present 1+ upper extremities and present 1+  lower extremities. Musculoskeletal: Spine ROM normal. Muscular strength intact. Neuro: Cranial nerves intact. Motor: Strength 5/5 in all extremities. Reflexes 2+ in all extremities. No focal weakness. Sensory: grossly normal to touch. Coordination intact. Pertinent Labs:  CBC:   Recent Labs     10/15/21  1635 10/16/21  1157 10/17/21  0900   WBC 8.3 8.7 10.0   HGB 11.9* 12.2* 12.0*    239 247     BMP:  Recent Labs     10/15/21  0620 10/15/21  0620 10/16/21  1157 10/16/21  1157 10/17/21  0900     --  132  --  133   K 4.0  --  3.7  --  3.8     --  98  --  98   CO2 20*  --  21*  --  22   BUN 28*  --  24*  --  21   CREATININE 1.4*  --  1.3*  --  1.3*   GLUCOSE 183*  --  181*  --  170*   LABGLOM 49   < > 53   < > 53    < > = values in this interval not displayed.      ABGs:   Lab Results   Component Value Date PH 7.410 10/13/2021    PO2 75.6 10/13/2021    PCO2 29.6 10/13/2021     INR: No results for input(s): INR in the last 72 hours. PRO-BNP:   Lab Results   Component Value Date    PROBNP 10,291 (H) 10/16/2021    PROBNP 11,016 (H) 10/13/2021      Cardiac Injury Profile: No results for input(s): CKTOTAL, CKMB, CKMBINDEX, TROPHS in the last 72 hours. Lipid Profile:   Lab Results   Component Value Date    TRIG 281 10/13/2021    HDL 11 10/13/2021    LDLCALC 30 10/13/2021    CHOL 97 10/13/2021      Thyroid: No results found for: TSHFT4, TSH   Hemoglobin A1C: No components found for: HGBA1C   ECG:  See report    Radiology:  Echo Complete    Result Date: 10/15/2021  Transthoracic Echocardiography Report (TTE)  Demographics   Patient Name    Jesse Cristobal Gender            Male                  A   Medical Record  [id number]     Room Number       8506  Number   Account #       [de-identified]    Procedure Date    10/14/2021   Corporate ID                 Ordering          Tania Stone MD                               Physician   Accession       [id number]   Referring  Number                       Physician   Date of Birth   1940   Sonographer       Uli Marin [facility]   Age             [de-identified] year(s)   Interpreting      [address] [place] [address]                               Physician         Physician Cardiology                                                 Tessie Gr MD                                Any Other  Procedure Type of Study   TTE procedure:Echo Complete W/Doppler & Color Flow. Procedure Date Date: 10/14/2021 Start: 03:36 PM Study Location: Portable Technical Quality: Adequate visualization Indications:Congestive heart failure. Patient Status: Routine Contrast Medium: Definity. Height: 67 inches Weight: 280 pounds BSA: 2.33 m^2 BMI: 43.85 kg/m^2 BP: 152/79 mmHg  Findings   Left Ventricle  Moderate asymmetric septal hypertrophy septal wall 1.6cm.   Mild global LV hypokineses  Ejection fraction is visually estimated at 40 to 45%.   Right Ventricle  Normal right ventricular size and function. Left Atrium  The left atrium is mildly dilated. Right Atrium  Mildly enlarged right atrium size. Mitral Valve  Mild mitral regurgitation is present. Tricuspid Valve  Mild tricuspid regurgitation. Aortic Valve  The aortic valve appears mildly sclerotic. No hemodynamically significant aortic stenosis is present. Pulmonic Valve  The pulmonic valve was not well visualized. Pericardial Effusion  Fat pad present. Conclusions   Summary  Moderate asymmetric septal hypertrophy septal wall 1.6cm. Mild global LV hypokineses  Ejection fraction is visually estimated at 40 to 45%. Normal right ventricular size and function. Mildly enlarged right atrium size. Mild mitral regurgitation is present. The aortic valve appears mildly sclerotic. No hemodynamically significant aortic stenosis is present. Mild tricuspid regurgitation.    Signature   ----------------------------------------------------------------  Electronically signed by Mellissa Zamora MD(Interpreting  physician) on 10/15/2021 12:36 PM  ----------------------------------------------------------------  M-Mode/2D Measurements & Calculations   LV Diastolic    LV Systolic Dimension: 3.9   AV Cusp Separation: 1.3 cmLA  Dimension: 4.9  cm                           Dimension: 4 cmAO Root  cm              LV Volume Diastolic: 502.8   Dimension: 3.6 cm  LV FS:20.4 %    ml  LV PW           LV Volume Systolic: 55.7 ml  Diastolic: 1.3  LV EDV/LV EDV Index: 113.9  cm              ml/49 ml/m^2LV ESV/LV ESV    RV Diastolic Dimension: 2.5  Septum          Index: 67.1 ml/29ml/ m^2     cm  Diastolic: 1.6  EF Calculated: 41.1 %  cm              LV Mass Index: 128 l/min*m^2 Ascending Aorta: 2.7 cm                  LV Length: 8.9 cm            LA volume/Index: 87.8 ml  LV Mass: 297.54                              /37.53ml/m^2  g               LVOT: 2.1 cm                 RA Area: 18.7 cm^2  Doppler Measurements & Calculations   MV Peak E-Wave: 1.12 AV Peak Velocity:     LVOT Peak Velocity: 0.94 m/s  m/s                  1.92 m/s              LVOT Mean Velocity: 0.71 m/s  MV Peak A-Wave: 0.29 AV Peak Gradient:     LVOT Peak Gradient: 3.5  m/s                  14.71 mmHg            mmHgLVOT Mean Gradient: 2.2  MV E/A Ratio: 3.89   AV Mean Velocity:     mmHg  MV Peak Gradient:    1.52 m/s              Estimated RVSP: 15.3 mmHg  4.8 mmHg             AV Mean Gradient: 9.8 Estimated RAP:3 mmHg  MV Mean Gradient:    mmHg  1.6 mmHg             AV VTI: 34.2 cm  MV Mean Velocity:    AV Area               TR Velocity:1.76 m/s  0.57 m/s             (Continuity):1.63     TR Gradient:12.35 mmHg  MV Deceleration      cm^2                  PV Peak Velocity: 1.31 m/s  Time: 178.2 msec                           PV Peak Gradient: 6.83 mmHg  MV P1/2t: 50.8 msec  LVOT VTI: 16.1 cm     PV Mean Velocity: 0.8 m/s  MVA by PHT:4.33 cm^2                       PV Mean Gradient: 2.9 mmHg  MV Area              Estimated PASP: 15.35  (continuity): 2.4    mmHg  cm^2  MV E' Septal  Velocity: 0.05 m/s  MV E' Lateral  Velocity: 10 m/s  [email or web address] [id number]    XR CHEST (2 VW)    Result Date: 10/11/2021  EXAMINATION: TWO XRAY VIEWS OF THE CHEST 10/11/2021 11:41 am COMPARISON: 09/18/2014 HISTORY: ORDERING SYSTEM PROVIDED HISTORY: cp TECHNOLOGIST PROVIDED HISTORY: Reason for exam:->cp What reading provider will be dictating this exam?->CRC FINDINGS: Heart size is normal.  There are no infiltrates or effusions. Normal chest     XR ABDOMEN (KUB) (SINGLE AP VIEW)    Result Date: 10/13/2021  EXAMINATION: ONE SUPINE XRAY VIEW(S) OF THE ABDOMEN 10/13/2021 8:41 pm COMPARISON: None.  HISTORY: ORDERING SYSTEM PROVIDED HISTORY: ileus TECHNOLOGIST PROVIDED HISTORY: Reason for exam:->ileus What reading provider will be dictating this exam?->CRC FINDINGS: The bowel gas pattern is nonobstructive. Status post cholecystectomy and posterior body fusion at L5-S1. Nonobstructive bowel gas pattern. No evidence of ileus. NM LUNG VENT/PERFUSION (VQ)    Result Date: 10/12/2021  EXAMINATION: NUCLEAR MEDICINE VENTILATION PERFUSION SCAN. 10/12/2021 TECHNIQUE: 63 millicuries aerosolized Tc99m DTPA was administered via mask prior to planar imaging of the lungs in multiple projections. Then, 7.5 millicuries of Tc 45A MAA was administered intravenously prior to planar imaging of the lungs in similar projections. COMPARISON: Chest radiograph 10/11/2021. HISTORY: ORDERING SYSTEM PROVIDED HISTORY: elevated d dimer exclude PE TECHNOLOGIST PROVIDED HISTORY: Reason for exam:->elevated d dimer exclude PE What reading provider will be dictating this exam?->CRC FINDINGS: PERFUSION: Mildly heterogeneous distribution of radiotracer. No unmatched segmental or subsegmental perfusion defects. VENTILATION: Heterogeneous distribution of radiotracer. There is some central airway deposition as can be seen with obstructive airways disease. CHEST RADIOGRAPH: No focal areas of consolidation or significant effusions on recent chest radiograph. Very low probability for pulmonary embolism. XR CHEST PORTABLE    Result Date: 10/13/2021  EXAMINATION: ONE XRAY VIEW OF THE CHEST 10/13/2021 2:23 pm COMPARISON: October 11, 2021 HISTORY: ORDERING SYSTEM PROVIDED HISTORY: wheezing TECHNOLOGIST PROVIDED HISTORY: Reason for exam:->wheezing What reading provider will be dictating this exam?->CRC FINDINGS: On today's study the heart silhouette appears to be larger than previous. There also appears to be mild increase in pulmonary vascularity which could suggest mild vascular congestion. Trachea is midline. No effusions are seen. No pneumothorax is identified. Visualized bony thorax shows no acute abnormality.      Cardiac silhouette appears somewhat larger although this is a portable image and there also appears to be slight increase in pulmonary vascularity. No other acute findings identified. US RETROPERITONEAL COMPLETE    Result Date: 10/12/2021  EXAMINATION: RETROPERITONEAL ULTRASOUND OF THE KIDNEYS AND URINARY BLADDER 10/12/2021 COMPARISON: None HISTORY: ORDERING SYSTEM PROVIDED HISTORY: acute kidney injury TECHNOLOGIST PROVIDED HISTORY: Reason for exam:->acute kidney injury What reading provider will be dictating this exam?->CRC FINDINGS: Kidneys: The right kidney measures 10.6 x 6.5 x 5.4 cm and the left kidney measures 12.5 x 4.6 x 6 cm Kidneys demonstrate normal cortical echogenicity. No evidence of hydronephrosis or intrarenal stones. 1.7 cm simple appearing cyst identified in the left and right midpole. No hydronephrosis. No renal calculus identified Simple appearing cysts in bilateral kidneys. US DUP LOWER EXTREMITIES BILATERAL VENOUS    Result Date: 10/12/2021  EXAMINATION: DUPLEX VENOUS ULTRASOUND OF THE BILATERAL LOWER VWLUTYDERCI92/12/2021 1:45 pm TECHNIQUE: Duplex ultrasound using B-mode/gray scaled imaging, Doppler spectral analysis and color flow Doppler was obtained of the deep venous structures of the lower bilateral extremities. COMPARISON: None. HISTORY: ORDERING SYSTEM PROVIDED HISTORY: elevated d dimer assess for DVT TECHNOLOGIST PROVIDED HISTORY: Reason for exam:->elevated d dimer assess for DVT What reading provider will be dictating this exam?->CRC FINDINGS: The visualized veins of the bilateral lower extremities are patent and free of echogenic thrombus. The veins demonstrate good compressibility with normal color flow study and spectral analysis. No evidence of DVT in either lower extremity. Assessment:    Active Problems:    Acute kidney injury ([address]    Hyponatremia    Metabolic acidosis    Acute respiratory distress  Resolved Problems:    * No resolved hospital problems. *      Plan:   With Mr. Fountain''s clinical presentation I would agree that he does warrant a cardiac catheterization considering his clinical history multiple risk factors as well as abnormal cardiac markers. I am concerned however as to the timing since he does not display any symptoms presently and the fact that he is being actively treated for C. difficile as well as an upper respiratory disorder which in part may be affected by his underlying heart disease. Nonetheless should he require surgery at this point would be extremely high risk and thus would consider conservative medical management followed by cardiac catheterization once fully stabilized. Would also obtain a repeat vitamin D and serum iron and ferritin levels considering his previous history and present left ventricular systolic dysfunction for which he may benefit by intravenous iron infusion if indeed he has low serum iron. Likewise it is important to know what his vitamin D level is presently considering how low it was approximately 7 years ago when undergoing evaluation for his right knee total arthroplasty. I have reviewed the potential cardiovascular risks of cardiac catheterization/PCI in detail with both the patient and his wife and he indicates that he fully understands and wishes to proceed. I have spent more than 50 minutes face to face with Farhana lCaudio reviewing notes and laboratory data with greater than 50% of this time instructing and counseling the patient and his wife regarding my findings and recommendations and I have answered all questions as posed to me by Mr. Leonel Reed and his family members. Valentín Longoria, DO , FACP, [address] [facility]    NOTE:  This report was transcribed using voice recognition software. Every effort was made to ensure accuracy; however, inadvertent computerized transcription errors may be present.

## 2021-10-17 NOTE — PROGRESS NOTES
Subjective: The patient is awake and alert. Sitting in chair eating breakfast.  No problems overnight. Denies chest pain, angina, and dyspnea. No diarrhea. Denies abdominal pain. Tolerating diet. No nausea or vomiting. Objective:    BP (!) 149/81   Pulse 69   Temp 98 °F (36.7 °C) (Temporal)   Resp 18   Ht 5' 7\" (1.702 m)   Wt 280 lb (127 kg)   SpO2 95%   BMI 43.85 kg/m²     Heart:  IRRR, no murmurs, gallops, or rubs. Lungs:  CTA bilaterally, no wheeze, rales or rhonchi  Abd: bowel sounds present, nontender, nondistended, no masses  Extrem:  No clubbing, cyanosis, or mild edema legs    CBC with Differential:    Lab Results   Component Value Date    WBC 8.7 10/16/2021    RBC 4.63 10/16/2021    HGB 12.2 10/16/2021    HCT 38.4 10/16/2021     10/16/2021    MCV 82.9 10/16/2021    MCH 26.3 10/16/2021    MCHC 31.8 10/16/2021    RDW 13.9 10/16/2021    BANDSPCT 1 09/26/2014    LYMPHOPCT 13.0 10/12/2021    MONOPCT 8.0 10/12/2021    BASOPCT 0.6 10/12/2021    MONOSABS 0.78 10/12/2021    LYMPHSABS 1.28 10/12/2021    EOSABS 0.01 10/12/2021    BASOSABS 0.06 10/12/2021     CMP:    Lab Results   Component Value Date     10/16/2021    K 3.7 10/16/2021    K 4.6 10/12/2021    CL 98 10/16/2021    CO2 21 10/16/2021    BUN 24 10/16/2021    CREATININE 1.3 10/16/2021    GFRAA >60 10/16/2021    LABGLOM 53 10/16/2021    GLUCOSE 181 10/16/2021    PROT 7.4 10/12/2021    LABALBU 3.4 10/12/2021    CALCIUM 9.5 10/16/2021    BILITOT 0.4 10/12/2021    ALKPHOS 110 10/12/2021    AST 89 10/12/2021    ALT 29 10/12/2021        Assessment:    Patient Active Problem List   Diagnosis    HTN (hypertension)    Diabetes ([facility]    Hyperlipidemia    Renal insufficiency    Osteoarthritis, knee    Acute kidney injury ([facility]    Hyponatremia    Metabolic acidosis    Acute respiratory distress       Plan:  Continue current care  Cath Monday?         Augie Padilla MD  9:39 AM  10/17/2021

## 2021-10-17 NOTE — PROGRESS NOTES
Jatin Noe M.D.,[facility]  Lisa Kitchen D.O., F.A.C.O.I., Ashli Carey M.D. Kwasi Ramírez M.D. Krystal Real D.O. Daily Pulmonary Progress Note    Patient:  Magdalena Emmanuel [de-identified] y.o. male MRN: [id number]     Date of Service: 10/17/2021      Synopsis     We are following patient for cough/wheezing    \"CC\" cough    Code status: full code      Subjective      Patient was seen and examined sitting in a chair in no apparent distress. He is still coughing with occasional tan thick sputum. He is upset after his conversation with cardiology d/t upcoming procedure. He is still on heparin gtt. He is for heart cath on Monday. He has a harsh cough but sounds more clear on auscultation. He remains on fidaxomycin for cdiff. Review of Systems:  Constitutional: Denies fever, weight loss, night sweats, and fatigue  Skin: Denies pigmentation, dark lesions, and rashes   HEENT: Denies hearing loss, tinnitus, ear drainage, epistaxis, sore throat, and hoarseness. Cardiovascular: Denies palpitations, chest pain, and chest pressure. Respiratory: + cough, -dyspnea at rest, -hemoptysis, apnea, and choking.   Gastrointestinal: Denies nausea, vomiting, poor appetite, diarrhea, heartburn or reflux  Genitourinary: Denies dysuria, frequency, urgency or hematuria  Musculoskeletal: Denies myalgias, muscle weakness, and bone pain  Neurological: Denies dizziness, vertigo, headache, and focal weakness  Psychological: Denies anxiety and depression  Endocrine: Denies heat intolerance and cold intolerance  Hematopoietic/Lymphatic: Denies bleeding problems and blood transfusions    24-hour events:  none    Objective   Vitals: BP (!) 149/81   Pulse 69   Temp 98 °F (36.7 °C) (Temporal)   Resp 18   Ht 5' 7\" (1.702 m)   Wt 280 lb (127 kg)   SpO2 95%   BMI 43.85 kg/m²     I/O:  No intake or output data in the 24 hours ending 10/17/21 1024    Vent Information  SpO2: 95 %                CURRENT MEDS :  Scheduled Meds:   is present.   Mild tricuspid regurgitation. Labs:  Lab Results   Component Value Date    WBC 10.0 10/17/2021    HGB 12.0 10/17/2021    HCT 38.0 10/17/2021    MCV 84.1 10/17/2021    MCH 26.5 10/17/2021    MCHC 31.6 10/17/2021    RDW 14.1 10/17/2021     10/17/2021    MPV 12.8 10/17/2021     Lab Results   Component Value Date     10/16/2021    K 3.7 10/16/2021    K 4.6 10/12/2021    CL 98 10/16/2021    CO2 21 10/16/2021    BUN 24 10/16/2021    CREATININE 1.3 10/16/2021    LABALBU 3.4 10/12/2021    CALCIUM 9.5 10/16/2021    GFRAA >60 10/16/2021    LABGLOM 53 10/16/2021     Lab Results   Component Value Date    PROTIME 24.1 09/26/2014    INR 2.2 09/26/2014     Recent Labs     10/16/21  1157   PROBNP 10,291*     No results for input(s): PROCAL in the last 72 hours. This SmartLink has not been configured with any valid records. Micro:  No results for input(s): CULTRESP in the last 72 hours. No results for input(s): LABGRAM in the last 72 hours. No results for input(s): LEGUR in the last 72 hours. Recent Labs     10/15/21  0940   STREPNEUMAGU Presumptive negative- suggests no current or recent  pneumococcal infection. Infection due to Strep pneumoniae cannot be  ruled out since the antigen present in the sample  may be below the detection limit of the test.  Normal Range:Presumptive Negative       Recent Labs     10/15/21  0940   LP1UAG Presumptive Negative -suggesting no recent or current infections  with Legionella pneumophila serogroup 1. Infection to Legionella cannot be ruled out since other serogroups  and species may cause infection, antigen may not be present in  early infection, or level of antigen may be below the  detection limit. Normal Range: Presumptive Negative            Assessment:    1. Generalized weakness and fatigue  2. Elevated D dimer   3. Intermittent dyspnea on exertion  4. Acute on chronic kidney disease, improving   5. Dehydration  6. Diabetes  7.  Prostate cancer s/p radiation  8. Obesity  9. Cough and wheezing likely secondary to URI  10. Diarrhea secondary to C diff colitis   11. Recent UTI for which he received bactrim   12. Atrial fibrillation  13. Cardiomyopathy with EF 40%      Plan:   1. Continue bronchodilators but change to xopenex  2. Echocardiogram and EKG reviewed -plans for heart cath on Monday  3. Fidaxomicin 200mg BID x 10 days for C diff  4. Sodium bicarb infusion per renal service: continues at lower rate  5. Hold off on inpatient sleep study until he is feeling better, however, I would definitely recommend this outpatient  6. Culture sputum  7. proBNP 11,016 on 10/13, repeat today  8. Add Protonix for GI prophylaxis    This plan of care was reviewed in collaboration with Dr. Ruthann Cabrera  Electronically signed by CHOCO Casarez CNP on 10/17/2021 at 10:24 AM      I personally saw, examined, and cared for the patient. Labs, medications, radiographs reviewed. I agree with history exam and plans detailed in NP note.   Celia Satnley MD

## 2021-10-17 NOTE — PROGRESS NOTES
[facility]  Nephrology Attending Progress Note          SUBJECTIVE:   10/17/21: States he feels great, daughter visiting. PROBLEM LIST:    Patient Active Problem List   Diagnosis    HTN (hypertension)    [facility] [address] [address]    Hyperlipidemia    Renal insufficiency    Osteoarthritis, knee    Acute kidney injury ([facility] [address]    Hyponatremia    Metabolic acidosis    Acute respiratory distress        PAST MEDICAL HISTORY:    Past Medical History:   Diagnosis Date    Acute pancreatitis     Diabetes mellitus ([facility] [address]     History of snoring     HTN (hypertension)     Hyperlipidemia     Osteoarthritis     Preoperative clearance 8/29/2014    cardiac- Dr. Huong Pisano; note in epic for surgery 9/23/2014    Preoperative clearance 9-19-14    medical clearance for OR 9-23-14 from Dr. Paul Rios on paper chart    Prostate cancer [facility]) 1999    radiation, seed implant    Renal insufficiency        DIET:    ADULT DIET;  Regular     PHYSICAL EXAM:     Patient Vitals for the past 24 hrs:   BP Temp Temp src Pulse Resp SpO2   10/17/21 1500 (!) 126/57 97.7 °F (36.5 °C) Oral 62 18 96 %   10/17/21 1429 -- -- -- -- -- 96 %   10/17/21 1040 -- -- -- -- -- 97 %   10/17/21 0931 -- -- -- 69 -- --   10/17/21 0929 (!) 149/81 98 °F (36.7 °C) Temporal 61 18 --   10/16/21 2345 132/73 97.2 °F (36.2 °C) Temporal 65 20 95 %   @    No intake or output data in the 24 hours ending 10/17/21 1620      Wt Readings from Last 3 Encounters:   10/11/21 280 lb (127 kg)   09/18/14 265 lb (120.2 kg)   08/28/14 265 lb 8 oz (120.4 kg)       General appearance: alert, in no acute distress  Skin: No rashes or lesions on exposed skin  Neck: No JVD  Lungs: Clear, no adventitious sounds  Heart: RRR, no rub  Abdomen: Soft, non-tender, + bowel sounds  Extremities: BLE edema  Neurologic: Mental status: Alert, oriented, thought content appropriate        MEDS (scheduled):    pantoprazole  40 mg Oral QAM AC    metoprolol succinate  25 mg Oral Daily    amLODIPine  5 mg Oral Daily    levalbuterol  0.63 mg Nebulization TID    Fidaxomicin  200 mg Oral BID    lactobacillus  1 capsule Oral Daily    benzonatate  200 mg Oral TID    insulin lispro  0-12 Units SubCUTAneous TID     insulin lispro  0-6 Units SubCUTAneous Nightly    influenza virus vaccine  0.5 mL IntraMUSCular Prior to discharge    rosuvastatin  10 mg Oral QPM       MEDS (infusions):   heparin (PORCINE) Infusion 15.8 Units/kg/hr (10/17/21 1316)    sodium bicarbonate infusion 50 mL/hr at 10/17/21 1319    dextrose         MEDS (prn):  guaiFENesin-dextromethorphan, regadenoson, heparin (porcine), heparin (porcine), temazepam, racepinephrine HCl, sodium chloride nebulizer, glucose, dextrose, glucagon (rDNA), dextrose, perflutren lipid microspheres, ondansetron **OR** ondansetron, polyethylene glycol, acetaminophen **OR** acetaminophen    DATA:    Recent Labs     10/15/21  1635 10/16/21  1157 10/17/21  0900   WBC 8.3 8.7 10.0   HGB 11.9* 12.2* 12.0*   HCT 37.0 38.4 38.0   MCV 81.3 82.9 84.1    239 247     Recent Labs     10/15/21  0620 10/16/21  1157 10/17/21  0900    132 133   K 4.0 3.7 3.8    98 98   CO2 20* 21* 22   BUN 28* 24* 21   CREATININE 1.4* 1.3* 1.3*   LABGLOM 49 53 53   GLUCOSE 183* 181* 170*   CALCIUM 9.1 9.5 9.7   MG 2.3 2.3  --    PHOS 2.2* 1.9*  --        Lab Results   Component Value Date    LABALBU 3.4 (L) 10/12/2021    LABALBU 3.5 10/11/2021    LABALBU 3.5 09/25/2014    LABALBU 2.8 (L) 09/25/2014     No results found for: TSH    Iron Studies  Lab Results   Component Value Date    IRON 9 (L) 09/25/2014    TIBC 334 09/25/2014    FERRITIN 205 09/25/2014     Vitamin B-12   Date Value Ref Range Status   09/25/2014 225 211 - 946 pg/mL Final     Folate   Date Value Ref Range Status   09/25/2014 13.0 7.3 - 26.1 ng/mL Final       Vit D, 25-Hydroxy   Date Value Ref Range Status   10/17/2021 12 (L) 30 - 100 ng/mL Final     Comment:     <20 ng/mL. ........... Elaine Rued Deficient  20-30 ng/mL.......... Elaine Rued Insufficient   ng/mL. ........ Elaine Rued Sufficient  >100 ng/mL. .......... Elaine Rued Toxic       PTH   Date Value Ref Range Status   09/25/2014 42 15 - 65 pg/mL Final       No components found for: URIC    Lab Results   Component Value Date    COLORU Yellow 10/12/2021    NITRU Negative 10/12/2021    GLUCOSEU Negative 10/12/2021    KETUA Negative 10/12/2021    UROBILINOGEN 0.2 10/12/2021    BILIRUBINUR SMALL 10/12/2021       No results found for: LIPIDPAN      IMPRESSION/RECOMMENDATIONS:         1. Hyponatremia  Na 129-->133-->132-->133     2. SAURABH  Baseline from 2014 of 1.5  Cr now 2.9>2.6.->2.0-->1.6-->1.4 -->1.3 under baseline  No hydro on shirley  Check daily bmp  Holding cozaar  Sp bactrim last week  Stop IVF     3. Shortness of breath-  Pulmonology following also     4. Hypertension-  Controlled at goal<130/80  Off  cozaar  On norvasc     5. Metabolic acidosis  In setting of ckd  Improving on IVF with HCO3  10/17: CO2 22 - stop IVF  [address], APRN - CNS       Patient seen and examined all key components of the physical performed independently , case discussed with NP, all pertinent labs and radiologic tests personally reviewed agree with above.       Kiran Jaime MD

## 2021-10-18 LAB
ANION GAP SERPL CALCULATED.3IONS-SCNC: 14 MMOL/L (ref 7–16)
APTT: 70.4 SEC (ref 24.5–35.1)
BUN BLDV-MCNC: 19 MG/DL (ref 6–23)
CALCIUM SERPL-MCNC: 9.5 MG/DL (ref 8.6–10.2)
CHLORIDE BLD-SCNC: 101 MMOL/L (ref 98–107)
CO2: 22 MMOL/L (ref 22–29)
CREAT SERPL-MCNC: 1.4 MG/DL (ref 0.7–1.2)
FERRITIN: 389 NG/ML
GFR AFRICAN AMERICAN: 59
GFR NON-AFRICAN AMERICAN: 49 ML/MIN/1.73
GLUCOSE BLD-MCNC: 152 MG/DL (ref 74–99)
IRON SATURATION: 15 % (ref 20–55)
IRON: 43 MCG/DL (ref 59–158)
METER GLUCOSE: 111 MG/DL (ref 74–99)
METER GLUCOSE: 119 MG/DL (ref 74–99)
METER GLUCOSE: 123 MG/DL (ref 74–99)
METER GLUCOSE: 164 MG/DL (ref 74–99)
POTASSIUM SERPL-SCNC: 4.1 MMOL/L (ref 3.5–5)
SODIUM BLD-SCNC: 137 MMOL/L (ref 132–146)
TOTAL IRON BINDING CAPACITY: 289 MCG/DL (ref 250–450)
TSH SERPL DL<=0.05 MIU/L-ACNC: 3.32 UIU/ML (ref 0.27–4.2)

## 2021-10-18 PROCEDURE — 85730 THROMBOPLASTIN TIME PARTIAL: CPT

## 2021-10-18 PROCEDURE — 6370000000 HC RX 637 (ALT 250 FOR IP): Performed by: INTERNAL MEDICINE

## 2021-10-18 PROCEDURE — 36415 COLL VENOUS BLD VENIPUNCTURE: CPT

## 2021-10-18 PROCEDURE — B2151ZZ FLUOROSCOPY OF LEFT HEART USING LOW OSMOLAR CONTRAST: ICD-10-PCS | Performed by: INTERNAL MEDICINE

## 2021-10-18 PROCEDURE — 2709999900 HC NON-CHARGEABLE SUPPLY

## 2021-10-18 PROCEDURE — 82728 ASSAY OF FERRITIN: CPT

## 2021-10-18 PROCEDURE — 80048 BASIC METABOLIC PNL TOTAL CA: CPT

## 2021-10-18 PROCEDURE — 93458 L HRT ARTERY/VENTRICLE ANGIO: CPT | Performed by: INTERNAL MEDICINE

## 2021-10-18 PROCEDURE — 2060000000 HC ICU INTERMEDIATE R&B

## 2021-10-18 PROCEDURE — 6360000002 HC RX W HCPCS: Performed by: INTERNAL MEDICINE

## 2021-10-18 PROCEDURE — 6370000000 HC RX 637 (ALT 250 FOR IP): Performed by: NURSE PRACTITIONER

## 2021-10-18 PROCEDURE — 82962 GLUCOSE BLOOD TEST: CPT

## 2021-10-18 PROCEDURE — C1769 GUIDE WIRE: HCPCS

## 2021-10-18 PROCEDURE — 6360000002 HC RX W HCPCS

## 2021-10-18 PROCEDURE — 2580000003 HC RX 258: Performed by: INTERNAL MEDICINE

## 2021-10-18 PROCEDURE — 83550 IRON BINDING TEST: CPT

## 2021-10-18 PROCEDURE — 2500000003 HC RX 250 WO HCPCS

## 2021-10-18 PROCEDURE — 84443 ASSAY THYROID STIM HORMONE: CPT

## 2021-10-18 PROCEDURE — 6370000000 HC RX 637 (ALT 250 FOR IP): Performed by: FAMILY MEDICINE

## 2021-10-18 PROCEDURE — 6360000002 HC RX W HCPCS: Performed by: NURSE PRACTITIONER

## 2021-10-18 PROCEDURE — 94640 AIRWAY INHALATION TREATMENT: CPT

## 2021-10-18 PROCEDURE — B2111ZZ FLUOROSCOPY OF MULTIPLE CORONARY ARTERIES USING LOW OSMOLAR CONTRAST: ICD-10-PCS | Performed by: INTERNAL MEDICINE

## 2021-10-18 PROCEDURE — 83540 ASSAY OF IRON: CPT

## 2021-10-18 PROCEDURE — 4A023N7 MEASUREMENT OF CARDIAC SAMPLING AND PRESSURE, LEFT HEART, PERCUTANEOUS APPROACH: ICD-10-PCS | Performed by: INTERNAL MEDICINE

## 2021-10-18 PROCEDURE — C1894 INTRO/SHEATH, NON-LASER: HCPCS

## 2021-10-18 RX ADMIN — METOPROLOL SUCCINATE 25 MG: 25 TABLET, EXTENDED RELEASE ORAL at 09:11

## 2021-10-18 RX ADMIN — FIDAXOMICIN 200 MG: 200 TABLET, FILM COATED ORAL at 09:11

## 2021-10-18 RX ADMIN — HEPARIN SODIUM 12.8 UNITS/KG/HR: 10000 INJECTION, SOLUTION INTRAVENOUS at 14:40

## 2021-10-18 RX ADMIN — ROSUVASTATIN 10 MG: 10 TABLET, FILM COATED ORAL at 17:58

## 2021-10-18 RX ADMIN — SODIUM CHLORIDE: 9 INJECTION, SOLUTION INTRAVENOUS at 17:53

## 2021-10-18 RX ADMIN — Medication 1 CAPSULE: at 09:11

## 2021-10-18 RX ADMIN — PANTOPRAZOLE SODIUM 40 MG: 40 TABLET, DELAYED RELEASE ORAL at 06:09

## 2021-10-18 RX ADMIN — LEVALBUTEROL HYDROCHLORIDE 0.63 MG: 0.63 SOLUTION RESPIRATORY (INHALATION) at 14:04

## 2021-10-18 RX ADMIN — AMLODIPINE BESYLATE 5 MG: 5 TABLET ORAL at 09:11

## 2021-10-18 RX ADMIN — BENZONATATE 200 MG: 100 CAPSULE ORAL at 09:11

## 2021-10-18 RX ADMIN — Medication 10 ML: at 22:41

## 2021-10-18 RX ADMIN — BENZONATATE 200 MG: 100 CAPSULE ORAL at 22:37

## 2021-10-18 ASSESSMENT — PAIN SCALES - GENERAL
PAINLEVEL_OUTOF10: 0

## 2021-10-18 NOTE — PROGRESS NOTES
Patient unable to void post cardiac cath procedure. Patient had a femoral artery puncture with closure and is on post bedrest.  16F reed catheter inserted using sterile technique without difficulty for return of clear justin urine. Patient tolerated procedure well.

## 2021-10-18 NOTE — PROGRESS NOTES
Dr. Marla Schmidt paged and message left with answering service regarding heparin gtt and heart cath in AM

## 2021-10-18 NOTE — PROGRESS NOTES
Nutrition Assessment     Type and Reason for Visit: Initial, RD Nutrition Re-Screen/LOS    Nutrition Recommendations/Plan: Recommend advancement to Carb Control 4 diet when medically appropriate. Nutrition Assessment:  Pt admitted w/ elevated troponin and SAURABH, PMH of prostate cancer and DM. Pt with improving renal function since admission. Noted C Diff. Fair intake with 50-75% intake of meals on average. NPO presently for cardiac cath today. No nutrition intervention needed at this time, will continue to monitor. Nutrition Related Findings: Pt A/Ox4, abd distended soft, diarrhea, +BS, +2.2L I/O, +1 BLE edema      Current Nutrition Therapies:    Diet NPO Exceptions are: Sips of Water with Meds    Anthropometric Measures:  · Height: 5' 7\" (170.2 cm)  · Current Body Wt: 280 lb (127 kg) (10/11 stated)   · BMI: 43.8    Nutrition Diagnosis:   No nutrition diagnosis at this time    Nutrition Interventions:   Food and/or Nutrient Delivery:  Modify Current Diet (Recommend advancement to Carb Control 4 diet when medically appropriate.)  Nutrition Education/Counseling:  Education not indicated   Coordination of Nutrition Care:  Continue to monitor while inpatient    Goals:  Pt is to consume >50% of most meals       Nutrition Monitoring and Evaluation:   Behavioral-Environmental Outcomes:  None Identified   Food/Nutrient Intake Outcomes:  Diet Advancement/Tolerance  Physical Signs/Symptoms Outcomes:  Biochemical Data, GI Status, Fluid Status or Edema, Nutrition Focused Physical Findings, Skin, Weight     Discharge Planning:     Too soon to determine     Electronically signed by Briana Johnson RD, LD on 10/18/21 at 3:58 PM EDT    Contact: [phone]

## 2021-10-18 NOTE — PROGRESS NOTES
[facility]  Nephrology Attending Progress Note          SUBJECTIVE:   10/18/21: awake and alert. Up to the BR for BM this am, feels less bloated.  Anxious regarding cardiac cath    PROBLEM LIST:    Patient Active Problem List   Diagnosis    HTN (hypertension)    Diabetes ([address]    Hyperlipidemia    Renal insufficiency    Osteoarthritis, knee    Acute kidney injury ([address]    Hyponatremia    Metabolic acidosis    Acute respiratory distress        PAST MEDICAL HISTORY:    Past Medical History:   Diagnosis Date    Acute pancreatitis     Diabetes mellitus ([address]     History of snoring     HTN (hypertension)     Hyperlipidemia     Osteoarthritis     Preoperative clearance 8/29/2014    cardiac- Dr. Nitish Haywood; note in epic for surgery 9/23/2014    Preoperative clearance 9-19-14    medical clearance for OR 9-23-14 from Dr. Nancy Donaldson on paper chart    Prostate cancer [facility]) 1999    radiation, seed implant    Renal insufficiency        DIET:    Diet NPO Exceptions are: Sips of Water with Meds     PHYSICAL EXAM:     Patient Vitals for the past 24 hrs:   BP Temp Temp src Pulse Resp SpO2   10/18/21 0720 (!) 136/58 96.4 °F (35.8 °C) Temporal 60 18 95 %   10/17/21 2315 (!) 141/59 97.9 °F (36.6 °C) Temporal 65 16 96 %   10/17/21 1500 (!) 126/57 97.7 °F (36.5 °C) Oral 62 18 96 %   10/17/21 1429 -- -- -- -- -- 96 %   10/17/21 1040 -- -- -- -- -- 97 %   @      Intake/Output Summary (Last 24 hours) at 10/18/2021 0950  Last data filed at 10/17/2021 2154  Gross per 24 hour   Intake 240 ml   Output 600 ml   Net -360 ml         Wt Readings from Last 3 Encounters:   10/11/21 280 lb (127 kg)   09/18/14 265 lb (120.2 kg)   08/28/14 265 lb 8 oz (120.4 kg)       General appearance: alert, in no acute distress  Skin: No rashes or lesions on exposed skin  Neck: No JVD  Lungs: Clear upper, diminished in bases  Heart: irregularly irregular  Abdomen: Soft, non-tender, + bowel sounds  Extremities: BLE edema  Neurologic: Mental status: Alert, oriented, thought content appropriate        MEDS (scheduled):    sodium chloride flush  5-40 mL IntraVENous 2 times per day    pantoprazole  40 mg Oral QAM AC    metoprolol succinate  25 mg Oral Daily    amLODIPine  5 mg Oral Daily    levalbuterol  0.63 mg Nebulization TID    Fidaxomicin  200 mg Oral BID    lactobacillus  1 capsule Oral Daily    benzonatate  200 mg Oral TID    insulin lispro  0-12 Units SubCUTAneous TID WC    insulin lispro  0-6 Units SubCUTAneous Nightly    influenza virus vaccine  0.5 mL IntraMUSCular Prior to discharge    rosuvastatin  10 mg Oral QPM       MEDS (infusions):   sodium chloride 50 mL/hr at 10/17/21 2206    sodium chloride      heparin (PORCINE) Infusion 12.8 Units/kg/hr (10/17/21 2305)    dextrose         MEDS (prn):  sodium chloride flush, sodium chloride, guaiFENesin-dextromethorphan, regadenoson, heparin (porcine), heparin (porcine), temazepam, racepinephrine HCl, sodium chloride nebulizer, glucose, dextrose, glucagon (rDNA), dextrose, perflutren lipid microspheres, ondansetron **OR** ondansetron, polyethylene glycol, acetaminophen **OR** acetaminophen    DATA:    Recent Labs     10/15/21  1635 10/16/21  1157 10/17/21  0900   WBC 8.3 8.7 10.0   HGB 11.9* 12.2* 12.0*   HCT 37.0 38.4 38.0   MCV 81.3 82.9 84.1    239 247     Recent Labs     10/16/21  1157 10/17/21  0900    133   K 3.7 3.8   CL 98 98   CO2 21* 22   BUN 24* 21   CREATININE 1.3* 1.3*   LABGLOM 53 53   GLUCOSE 181* 170*   CALCIUM 9.5 9.7   MG 2.3  --    PHOS 1.9*  --        Lab Results   Component Value Date    LABALBU 3.4 (L) 10/12/2021    LABALBU 3.5 10/11/2021    LABALBU 3.5 09/25/2014    LABALBU 2.8 (L) 09/25/2014     No results found for: TSH    Iron Studies  Lab Results   Component Value Date    IRON 9 (L) 09/25/2014    TIBC 334 09/25/2014    FERRITIN 205 09/25/2014     Vitamin B-12   Date Value Ref Range Status   09/25/2014 225 211 - 946 pg/mL Final     Folate   Date Value Ref Range Status   09/25/2014 13.0 7.3 - 26.1 ng/mL Final       Vit D, 25-Hydroxy   Date Value Ref Range Status   10/17/2021 12 (L) 30 - 100 ng/mL Final     Comment:     <20 ng/mL. ........... Luis Armando Punt Deficient  20-30 ng/mL. ......... Luis Armando Punt Insufficient   ng/mL. ........ Luis Armando Punt Sufficient  >100 ng/mL. .......... Luis Armando Punt Toxic       PTH   Date Value Ref Range Status   09/25/2014 42 15 - 65 pg/mL Final       No components found for: URIC    Lab Results   Component Value Date    COLORU Yellow 10/12/2021    NITRU Negative 10/12/2021    GLUCOSEU Negative 10/12/2021    KETUA Negative 10/12/2021    UROBILINOGEN 0.2 10/12/2021    BILIRUBINUR SMALL 10/12/2021       No results found for: LIPIDPAN      IMPRESSION/RECOMMENDATIONS:         1. Hyponatremia  Na 129-->133-->132-->133     2. SAURABH  Baseline from 2014 of 1.5  Cr now 2.9>2.6.->2.0-->1.6-->1.4 -->1.3 under baseline  No hydro on shirley  Check daily bmp  Holding cozaar  Sp bactrim last week  Stop IVF     3. Shortness of breath-  Pulmonology following also     4. Hypertension-  Controlled at goal<130/80  Off  cozaar  On norvasc     5. Metabolic acidosis  In setting of ckd  Improving on IVF with HCO3  10/17: CO2 22 - stop IVF     No labs today, will follow in the am post cardiac cath    CHOCO Leger - CNP     Patient seen and examined all key components of the physical performed independently , case discussed with NP, all pertinent labs and radiologic tests personally reviewed agree with above.       Kami Rodriguez MD

## 2021-10-18 NOTE — PROGRESS NOTES
Subjective: The patient is awake and alert. No problems overnight. Denies chest pain, angina, and dyspnea. Denies abdominal pain. Tolerating diet. No nausea or vomiting. For cardiac cath today    Objective:    BP (!) 141/59   Pulse 65   Temp 97.9 °F (36.6 °C) (Temporal)   Resp 16   Ht 5' 7\" (1.702 m)   Wt 280 lb (127 kg)   SpO2 96%   BMI 43.85 kg/m²     Heart:  IRRR, no murmurs, gallops, or rubs.   Lungs:  CTA bilaterally, no wheeze, rales or rhonchi  Abd: bowel sounds present, nontender, nondistended, no masses  Extrem:  No clubbing, cyanosis, or mild edema legs    CBC with Differential:    Lab Results   Component Value Date    WBC 10.0 10/17/2021    RBC 4.52 10/17/2021    HGB 12.0 10/17/2021    HCT 38.0 10/17/2021     10/17/2021    MCV 84.1 10/17/2021    MCH 26.5 10/17/2021    MCHC 31.6 10/17/2021    RDW 14.1 10/17/2021    BANDSPCT 1 09/26/2014    LYMPHOPCT 13.0 10/12/2021    MONOPCT 8.0 10/12/2021    BASOPCT 0.6 10/12/2021    MONOSABS 0.78 10/12/2021    LYMPHSABS 1.28 10/12/2021    EOSABS 0.01 10/12/2021    BASOSABS 0.06 10/12/2021     CMP:    Lab Results   Component Value Date     10/17/2021    K 3.8 10/17/2021    K 4.6 10/12/2021    CL 98 10/17/2021    CO2 22 10/17/2021    BUN 21 10/17/2021    CREATININE 1.3 10/17/2021    GFRAA >60 10/17/2021    LABGLOM 53 10/17/2021    GLUCOSE 170 10/17/2021    PROT 7.4 10/12/2021    LABALBU 3.4 10/12/2021    CALCIUM 9.7 10/17/2021    BILITOT 0.4 10/12/2021    ALKPHOS 110 10/12/2021    AST 89 10/12/2021    ALT 29 10/12/2021        Assessment:    Patient Active Problem List   Diagnosis    HTN (hypertension)    [facility] [address]    Hyperlipidemia    Renal insufficiency    Osteoarthritis, knee    Acute kidney injury ([facility] [address]    Hyponatremia    Metabolic acidosis    Acute respiratory distress       Plan:  Rose Foreman MD  8:25 AM  10/18/2021

## 2021-10-19 LAB
ANION GAP SERPL CALCULATED.3IONS-SCNC: 13 MMOL/L (ref 7–16)
ANISOCYTOSIS: ABNORMAL
APTT: 32.1 SEC (ref 24.5–35.1)
ATYPICAL LYMPHOCYTE RELATIVE PERCENT: 2.6 % (ref 0–4)
BASOPHILS ABSOLUTE: 0.22 E9/L (ref 0–0.2)
BASOPHILS RELATIVE PERCENT: 2.6 % (ref 0–2)
BUN BLDV-MCNC: 17 MG/DL (ref 6–23)
CALCIUM SERPL-MCNC: 9.7 MG/DL (ref 8.6–10.2)
CHLORIDE BLD-SCNC: 107 MMOL/L (ref 98–107)
CO2: 22 MMOL/L (ref 22–29)
CREAT SERPL-MCNC: 1.3 MG/DL (ref 0.7–1.2)
CULTURE, RESPIRATORY: NORMAL
EOSINOPHILS ABSOLUTE: 0 E9/L (ref 0.05–0.5)
EOSINOPHILS RELATIVE PERCENT: 1.4 % (ref 0–6)
GFR AFRICAN AMERICAN: >60
GFR NON-AFRICAN AMERICAN: 53 ML/MIN/1.73
GLUCOSE BLD-MCNC: 107 MG/DL (ref 74–99)
HCT VFR BLD CALC: 37.1 % (ref 37–54)
HEMOGLOBIN: 11.5 G/DL (ref 12.5–16.5)
HYPOCHROMIA: ABNORMAL
LYMPHOCYTES ABSOLUTE: 1.29 E9/L (ref 1.5–4)
LYMPHOCYTES RELATIVE PERCENT: 12.2 % (ref 20–42)
MAGNESIUM: 2.1 MG/DL (ref 1.6–2.6)
MCH RBC QN AUTO: 26.4 PG (ref 26–35)
MCHC RBC AUTO-ENTMCNC: 31 % (ref 32–34.5)
MCV RBC AUTO: 85.3 FL (ref 80–99.9)
METAMYELOCYTES RELATIVE PERCENT: 1.7 % (ref 0–1)
METER GLUCOSE: 115 MG/DL (ref 74–99)
METER GLUCOSE: 124 MG/DL (ref 74–99)
METER GLUCOSE: 148 MG/DL (ref 74–99)
METER GLUCOSE: 169 MG/DL (ref 74–99)
MONOCYTES ABSOLUTE: 0.77 E9/L (ref 0.1–0.95)
MONOCYTES RELATIVE PERCENT: 8.7 % (ref 2–12)
MYELOCYTE PERCENT: 0.9 % (ref 0–0)
NEUTROPHILS ABSOLUTE: 6.36 E9/L (ref 1.8–7.3)
NEUTROPHILS RELATIVE PERCENT: 71.3 % (ref 43–80)
PDW BLD-RTO: 14.6 FL (ref 11.5–15)
PHOSPHORUS: 3.1 MG/DL (ref 2.5–4.5)
PLATELET # BLD: 322 E9/L (ref 130–450)
PMV BLD AUTO: 11.2 FL (ref 7–12)
POLYCHROMASIA: ABNORMAL
POTASSIUM SERPL-SCNC: 4.4 MMOL/L (ref 3.5–5)
RBC # BLD: 4.35 E12/L (ref 3.8–5.8)
REASON FOR REJECTION: NORMAL
REASON FOR REJECTION: NORMAL
REJECTED TEST: NORMAL
REJECTED TEST: NORMAL
SMEAR, RESPIRATORY: NORMAL
SODIUM BLD-SCNC: 142 MMOL/L (ref 132–146)
STOMATOCYTES: ABNORMAL
TARGET CELLS: ABNORMAL
WBC # BLD: 8.6 E9/L (ref 4.5–11.5)

## 2021-10-19 PROCEDURE — 83735 ASSAY OF MAGNESIUM: CPT

## 2021-10-19 PROCEDURE — 2060000000 HC ICU INTERMEDIATE R&B

## 2021-10-19 PROCEDURE — 94640 AIRWAY INHALATION TREATMENT: CPT

## 2021-10-19 PROCEDURE — 82962 GLUCOSE BLOOD TEST: CPT

## 2021-10-19 PROCEDURE — 2580000003 HC RX 258: Performed by: INTERNAL MEDICINE

## 2021-10-19 PROCEDURE — 36415 COLL VENOUS BLD VENIPUNCTURE: CPT

## 2021-10-19 PROCEDURE — 6370000000 HC RX 637 (ALT 250 FOR IP): Performed by: NURSE PRACTITIONER

## 2021-10-19 PROCEDURE — 6370000000 HC RX 637 (ALT 250 FOR IP): Performed by: INTERNAL MEDICINE

## 2021-10-19 PROCEDURE — 97530 THERAPEUTIC ACTIVITIES: CPT

## 2021-10-19 PROCEDURE — 6360000002 HC RX W HCPCS: Performed by: INTERNAL MEDICINE

## 2021-10-19 PROCEDURE — 80048 BASIC METABOLIC PNL TOTAL CA: CPT

## 2021-10-19 PROCEDURE — 6370000000 HC RX 637 (ALT 250 FOR IP): Performed by: FAMILY MEDICINE

## 2021-10-19 PROCEDURE — 84100 ASSAY OF PHOSPHORUS: CPT

## 2021-10-19 PROCEDURE — 85025 COMPLETE CBC W/AUTO DIFF WBC: CPT

## 2021-10-19 PROCEDURE — 85730 THROMBOPLASTIN TIME PARTIAL: CPT

## 2021-10-19 RX ORDER — GUAIFENESIN 400 MG/1
400 TABLET ORAL 3 TIMES DAILY
Status: DISCONTINUED | OUTPATIENT
Start: 2021-10-19 | End: 2021-10-25 | Stop reason: HOSPADM

## 2021-10-19 RX ADMIN — PANTOPRAZOLE SODIUM 40 MG: 40 TABLET, DELAYED RELEASE ORAL at 06:50

## 2021-10-19 RX ADMIN — FIDAXOMICIN 200 MG: 200 TABLET, FILM COATED ORAL at 00:11

## 2021-10-19 RX ADMIN — GUAIFENESIN 400 MG: 400 TABLET ORAL at 20:13

## 2021-10-19 RX ADMIN — METOPROLOL SUCCINATE 25 MG: 25 TABLET, EXTENDED RELEASE ORAL at 08:18

## 2021-10-19 RX ADMIN — Medication 1 CAPSULE: at 08:15

## 2021-10-19 RX ADMIN — Medication 10 ML: at 08:18

## 2021-10-19 RX ADMIN — ROSUVASTATIN 10 MG: 10 TABLET, FILM COATED ORAL at 17:31

## 2021-10-19 RX ADMIN — APIXABAN 5 MG: 5 TABLET, FILM COATED ORAL at 08:16

## 2021-10-19 RX ADMIN — LEVALBUTEROL HYDROCHLORIDE 0.63 MG: 0.63 SOLUTION RESPIRATORY (INHALATION) at 13:28

## 2021-10-19 RX ADMIN — BENZONATATE 200 MG: 100 CAPSULE ORAL at 20:13

## 2021-10-19 RX ADMIN — INSULIN LISPRO 2 UNITS: 100 INJECTION, SOLUTION INTRAVENOUS; SUBCUTANEOUS at 17:31

## 2021-10-19 RX ADMIN — FIDAXOMICIN 200 MG: 200 TABLET, FILM COATED ORAL at 08:15

## 2021-10-19 RX ADMIN — LEVALBUTEROL HYDROCHLORIDE 0.63 MG: 0.63 SOLUTION RESPIRATORY (INHALATION) at 09:32

## 2021-10-19 RX ADMIN — BENZONATATE 200 MG: 100 CAPSULE ORAL at 14:57

## 2021-10-19 RX ADMIN — LEVALBUTEROL HYDROCHLORIDE 0.63 MG: 0.63 SOLUTION RESPIRATORY (INHALATION) at 20:43

## 2021-10-19 RX ADMIN — AMLODIPINE BESYLATE 5 MG: 5 TABLET ORAL at 08:16

## 2021-10-19 RX ADMIN — APIXABAN 5 MG: 5 TABLET, FILM COATED ORAL at 20:12

## 2021-10-19 RX ADMIN — FIDAXOMICIN 200 MG: 200 TABLET, FILM COATED ORAL at 20:13

## 2021-10-19 RX ADMIN — Medication 10 ML: at 20:15

## 2021-10-19 RX ADMIN — BENZONATATE 200 MG: 100 CAPSULE ORAL at 08:15

## 2021-10-19 ASSESSMENT — PAIN SCALES - GENERAL
PAINLEVEL_OUTOF10: 0
PAINLEVEL_OUTOF10: 0

## 2021-10-19 NOTE — CARE COORDINATION
10/19: Update CM Note: Left message for pt or his wife to call me. Pt had a cardiac cath yesterday and scheduled for a sleep study tonight. PT 17/24. Arcenio/MICKEY will continue to follow.  Electronically signed by Laine Menjivar RN on 10/19/2021 at 3:12 PM

## 2021-10-19 NOTE — PROGRESS NOTES
[facility]  Nephrology Attending Progress Note          SUBJECTIVE:   10/18/21: awake and alert. Up to the BR for BM this am, feels less bloated. Anxious regarding cardiac cath    PROBLEM LIST:    Patient Active Problem List   Diagnosis    HTN (hypertension)    Diabetes ([facility]    Hyperlipidemia    Renal insufficiency    Osteoarthritis, knee    Acute kidney injury ([facility]    Hyponatremia    Metabolic acidosis    Acute respiratory distress        PAST MEDICAL HISTORY:    Past Medical History:   Diagnosis Date    Acute pancreatitis     Diabetes mellitus ([facility]     History of snoring     HTN (hypertension)     Hyperlipidemia     Osteoarthritis     Preoperative clearance 8/29/2014    cardiac- Dr. Laura Lopez; note in epic for surgery 9/23/2014    Preoperative clearance 9-19-14    medical clearance for OR 9-23-14 from Dr. Hamlet Thomas on paper chart    Prostate cancer [facility]) 1999    radiation, seed implant    Renal insufficiency        DIET:    ADULT DIET;  Regular     PHYSICAL EXAM:     Patient Vitals for the past 24 hrs:   BP Temp Temp src Pulse Resp SpO2 Height   10/19/21 0745 (!) 114/90 97.4 °F (36.3 °C) Temporal 79 16 95 % --   10/18/21 2145 (!) 130/57 97.4 °F (36.3 °C) Temporal 61 16 93 % --   10/18/21 1745 138/62 97.2 °F (36.2 °C) Temporal 59 16 96 % --   10/18/21 1554 -- -- -- -- -- -- 5' 7\" (1.702 m)   10/18/21 1505 137/64 97.2 °F (36.2 °C) Temporal 72 16 97 % --   @      Intake/Output Summary (Last 24 hours) at 10/19/2021 1218  Last data filed at 10/19/2021 1108  Gross per 24 hour   Intake 1390 ml   Output 850 ml   Net 540 ml         Wt Readings from Last 3 Encounters:   10/11/21 280 lb (127 kg)   09/18/14 265 lb (120.2 kg)   08/28/14 265 lb 8 oz (120.4 kg)       General appearance: alert, in no acute distress  Skin: No rashes or lesions on exposed skin  Neck: No JVD  Lungs: Clear upper, diminished in bases  Heart: irregularly irregular  Abdomen: Soft, non-tender, + bowel sounds  Extremities: BLE edema  Neurologic: Mental status: Alert, oriented, thought content appropriate        MEDS (scheduled):    apixaban  5 mg Oral BID    sodium chloride flush  5-40 mL IntraVENous 2 times per day    pantoprazole  40 mg Oral QAM AC    metoprolol succinate  25 mg Oral Daily    amLODIPine  5 mg Oral Daily    levalbuterol  0.63 mg Nebulization TID    Fidaxomicin  200 mg Oral BID    lactobacillus  1 capsule Oral Daily    benzonatate  200 mg Oral TID    insulin lispro  0-12 Units SubCUTAneous TID WC    insulin lispro  0-6 Units SubCUTAneous Nightly    influenza virus vaccine  0.5 mL IntraMUSCular Prior to discharge    rosuvastatin  10 mg Oral QPM       MEDS (infusions):   sodium chloride      dextrose         MEDS (prn):  sodium chloride flush, sodium chloride, guaiFENesin-dextromethorphan, regadenoson, temazepam, racepinephrine HCl, sodium chloride nebulizer, glucose, dextrose, glucagon (rDNA), dextrose, perflutren lipid microspheres, ondansetron **OR** ondansetron, polyethylene glycol, acetaminophen **OR** acetaminophen    DATA:    Recent Labs     10/17/21  0900   WBC 10.0   HGB 12.0*   HCT 38.0   MCV 84.1        Recent Labs     10/17/21  0900 10/18/21  1343 10/19/21  0645    137 142   K 3.8 4.1 4.4   CL 98 101 107   CO2 22 22 22   BUN 21 19 17   CREATININE 1.3* 1.4* 1.3*   LABGLOM 53 49 53   GLUCOSE 170* 152* 107*   CALCIUM 9.7 9.5 9.7   MG  --   --  2.1   PHOS  --   --  3.1       Lab Results   Component Value Date    LABALBU 3.4 (L) 10/12/2021    LABALBU 3.5 10/11/2021    LABALBU 3.5 09/25/2014    LABALBU 2.8 (L) 09/25/2014     Lab Results   Component Value Date    TSH 3.320 10/18/2021       Iron Studies  Lab Results   Component Value Date    IRON 43 (L) 10/18/2021    TIBC 289 10/18/2021    FERRITIN 389 10/18/2021     Vitamin B-12   Date Value Ref Range Status   09/25/2014 225 211 - 946 pg/mL Final     Folate   Date Value Ref Range Status   09/25/2014 13.0 7.3 - 26.1 ng/mL Final       Vit D, 25-Hydroxy   Date Value Ref Range Status   10/17/2021 12 (L) 30 - 100 ng/mL Final     Comment:     <20 ng/mL. ........... Luis Alberto Pickles Deficient  20-30 ng/mL. ......... Luis Alberto Pickles Insufficient   ng/mL. ........ Luis Alberto Pickles Sufficient  >100 ng/mL. .......... Luis Alberto Pickles Toxic       PTH   Date Value Ref Range Status   09/25/2014 42 15 - 65 pg/mL Final       No components found for: URIC    Lab Results   Component Value Date    COLORU Yellow 10/12/2021    NITRU Negative 10/12/2021    GLUCOSEU Negative 10/12/2021    KETUA Negative 10/12/2021    UROBILINOGEN 0.2 10/12/2021    BILIRUBINUR SMALL 10/12/2021       No results found for: LIPIDPAN      IMPRESSION/RECOMMENDATIONS:         1. Hyponatremia  Na 129-->133-->132-->133-->137-->142     2. SAURABH  Baseline from 2014 of 1.5  Cr now 2.9>2.6.->2.0-->1.6-->1.4 -->1.3 under baseline  No hydro on shirley  Check daily bmp  Holding cozaar  Sp bactrim   Stop IVF     3. Shortness of breath-  Pulmonology following also  Cardiology following     4. Hypertension-  Controlled at goal<130/80  Off  cozaar  On norvasc     5. Metabolic acidosis  In setting of ckd  Stable  off IVF         CHOCO Warren - CNP     Patient seen and examined all key components of the physical performed independently , case discussed with NP, all pertinent labs and radiologic tests personally reviewed agree with above.       Xochitl Alfaro MD

## 2021-10-19 NOTE — PROGRESS NOTES
Sitting up eating breakfast.  States he is feeling well. Denies chest pain or shortness of breath. Do not have the specifics on his cardiac catheterization from yesterday but it appears he has multivessel disease. Awaiting more information from cardiology. Vital signs are stable  Renal function is stable  Chest is clear  Heart irregular  Assessment multivessel coronary disease  Reduced ejection fraction  C.  Difficile colitis resolved  Probable OMARI  Plan await further recommendations from cardiology  It appears that patient will go to [facility] if he needs to have bypass surgery

## 2021-10-19 NOTE — PROGRESS NOTES
PROGRESS NOTE       PATIENT PROBLEM LIST:  Active Problems:    Acute kidney injury ([address]    Hyponatremia    Metabolic acidosis    Acute respiratory distress  Resolved Problems:    * No resolved hospital problems. *      SUBJECTIVE:  Una Fuller states he feels comfortable following cardiac catheterization earlier today. He is anxious to get up out of bed but understands that we need to avoid the potential for hematoma. REVIEW OF SYSTEMS:  General ROS: negative for - fatigue, malaise,  weight gain or weight loss  Psychological ROS: positive for - anxiety and depression  Ophthalmic ROS: negative for - decreased vision or visual distortion. ENT ROS: negative  Allergy and Immunology ROS: negative  Hematological and Lymphatic ROS: negative  Endocrine: no heat or cold intolerance and no polyphagia, polydipsia, or polyuria  Respiratory ROS: positive for - cough and shortness of breath  Cardiovascular ROS: positive for - dyspnea on exertion, edema, irregular heartbeat, rapid heart rate and shortness of breath. Gastrointestinal ROS: no abdominal pain, change in bowel habits, or black or bloody stools  Genito-Urinary ROS: no nocturia, dysuria, trouble voiding, frequency or hematuria  Musculoskeletal ROS: negative for- myalgias, arthralgias, or claudication  Neurological ROS: no TIA or stroke symptoms otherwise no significant change in symptoms or problems since yesterday as documented in previous progress notes.     SCHEDULED MEDICATIONS:   sodium chloride flush  5-40 mL IntraVENous 2 times per day    pantoprazole  40 mg Oral QAM AC    metoprolol succinate  25 mg Oral Daily    amLODIPine  5 mg Oral Daily    levalbuterol  0.63 mg Nebulization TID    Fidaxomicin  200 mg Oral BID    lactobacillus  1 capsule Oral Daily    benzonatate  200 mg Oral TID    insulin lispro  0-12 Units SubCUTAneous TID WC    insulin lispro  0-6 Units SubCUTAneous Nightly    influenza virus vaccine  0.5 mL IntraMUSCular Prior to discharge    rosuvastatin  10 mg Oral QPM       VITAL SIGNS:                                                                                                                          BP (!) 130/57   Pulse 61   Temp 97.4 °F (36.3 °C) (Temporal)   Resp 16   Ht 5' 7\" (1.702 m)   Wt 280 lb (127 kg)   SpO2 93%   BMI 43.85 kg/m²   No data found. OBJECTIVE:    HEENT: PERRL, EOM  Intact; sclera non-icteric, conjunctiva pink. Carotids are brisk in upstroke with normal contour. No carotid bruits. Normal jugular venous pulsation at 45°. No palpable cervical nor supraclavicular nodes. Thyroid not palpable. Trachea midline. Chest: Even excursion  Lungs: CTA B, no expiratory wheezes or rhonchi, no decreased tactile fremitus without inspiratory rales. Heart: Regular  rhythm; S1 > S2, no gallop or murmur. No clicks, rub, palpable thrills   or heaves. PMI nondisplaced, 5th intercostal space MCL. Abdomen: Soft, nontender, nondistended,  grossly protuberant, no masses or organomegaly. Bowel sounds active. Extremities: Without clubbing, cyanosis or edema. Pulses present 3+ upper extermities bilaterally; present 1+ DP and present 1+ PT bilaterally.      Data:   Scheduled Meds: Reviewed  Continuous Infusions:    sodium chloride 50 mL/hr at 10/18/21 1753    sodium chloride      [Held by provider] heparin (PORCINE) Infusion Stopped (10/18/21 1530)    dextrose         Intake/Output Summary (Last 24 hours) at 10/18/2021 2344  Last data filed at 10/18/2021 2200  Gross per 24 hour   Intake 1110 ml   Output 850 ml   Net 260 ml     CBC:   Recent Labs     10/16/21  1157 10/17/21  0900   WBC 8.7 10.0   HGB 12.2* 12.0*   HCT 38.4 38.0    247     BMP:  Recent Labs     10/16/21  1157 10/16/21  1157 10/17/21  0900 10/17/21  0900 10/18/21  1343     --  133  --  137   K 3.7  --  3.8  --  4.1   CL 98  --  98  --  101   CO2 21*  --  22  --  22   BUN 24*  --  21  --  19   CREATININE 1.3*  --  1.3*  --  1.4*   LABGLOM 53   < > 53   < > 49    < > = values in this interval not displayed. ABGs:   Lab Results   Component Value Date    PH 7.410 10/13/2021    PO2 75.6 10/13/2021    PCO2 29.6 10/13/2021     INR: No results for input(s): INR in the last 72 hours. PRO-BNP:   Lab Results   Component Value Date    PROBNP 10,291 (H) 10/16/2021    PROBNP 11,016 (H) 10/13/2021      TSH:   Lab Results   Component Value Date    TSH 3.320 10/18/2021      Cardiac Injury Profile:   Lab Results   Component Value Date    TROPHS 165 (H) 10/12/2021      Lipid Profile:   Lab Results   Component Value Date    TRIG 281 10/13/2021    HDL 11 10/13/2021    LDLCALC 30 10/13/2021    CHOL 97 10/13/2021      Hemoglobin A1C: No components found for: HGBA1C      RAD:   Echo Complete    Result Date: 10/15/2021  Transthoracic Echocardiography Report (TTE)  Demographics   Patient Name    Verneda Borne Gender            Male                  A   Medical Record  [id number]     Room Number       8506  Number   Account #       [de-identified]    Procedure Date    10/14/2021   Corporate ID                 Ordering          Clari Cristobal MD                               Physician   Accession       [id number]   Referring  Number                       Physician   Date of Birth   1940   [address]   Age             [de-identified] year(s)   Interpreting      [address]                               Physician         Physician Cardiology                                                 Abhi Gr MD                                Any Other  Procedure Type of Study   TTE procedure:Echo Complete W/Doppler & Color Flow. Procedure Date Date: 10/14/2021 Start: 03:36 PM Study Location: Portable Technical Quality: Adequate visualization Indications:Congestive heart failure. Patient Status: Routine Contrast Medium: Definity.  Height: 67 inches Weight: 280 pounds BSA: 2.33 m^2 BMI: 43.85 kg/m^2 BP: 152/79 mmHg  Findings   Left Ventricle  Moderate asymmetric septal hypertrophy septal wall 1.6cm. Mild global LV hypokineses  Ejection fraction is visually estimated at 40 to 45%. Right Ventricle  Normal right ventricular size and function. Left Atrium  The left atrium is mildly dilated. Right Atrium  Mildly enlarged right atrium size. Mitral Valve  Mild mitral regurgitation is present. Tricuspid Valve  Mild tricuspid regurgitation. Aortic Valve  The aortic valve appears mildly sclerotic. No hemodynamically significant aortic stenosis is present. Pulmonic Valve  The pulmonic valve was not well visualized. Pericardial Effusion  Fat pad present. Conclusions   Summary  Moderate asymmetric septal hypertrophy septal wall 1.6cm. Mild global LV hypokineses  Ejection fraction is visually estimated at 40 to 45%. Normal right ventricular size and function. Mildly enlarged right atrium size. Mild mitral regurgitation is present. The aortic valve appears mildly sclerotic. No hemodynamically significant aortic stenosis is present. Mild tricuspid regurgitation.    Signature   ----------------------------------------------------------------  Electronically signed by Alisha Napier MD(Interpreting  physician) on 10/15/2021 12:36 PM  ----------------------------------------------------------------  M-Mode/2D Measurements & Calculations   LV Diastolic    LV Systolic Dimension: 3.9   AV Cusp Separation: 1.3 cmLA  Dimension: 4.9  cm                           Dimension: 4 cmAO Root  cm              LV Volume Diastolic: 208.7   Dimension: 3.6 cm  LV FS:20.4 %    ml  LV PW           LV Volume Systolic: 68.9 ml  Diastolic: 1.3  LV EDV/LV EDV Index: 113.9  cm              ml/49 ml/m^2LV ESV/LV ESV    RV Diastolic Dimension: 2.5  Septum          Index: 67.1 ml/29ml/ m^2     cm  Diastolic: 1.6  EF Calculated: 41.1 %  cm              LV Mass Index: 128 l/min*m^2 Ascending Aorta: 2.7 cm                  LV Length: 8.9 cm            LA volume/Index: 87.8 ml  LV Mass: 297.54 TECHNOLOGIST PROVIDED HISTORY: Reason for exam:->ileus What reading provider will be dictating this exam?->CRC FINDINGS: The bowel gas pattern is nonobstructive. Status post cholecystectomy and posterior body fusion at L5-S1. Nonobstructive bowel gas pattern. No evidence of ileus. NM LUNG VENT/PERFUSION (VQ)    Result Date: 10/12/2021  EXAMINATION: NUCLEAR MEDICINE VENTILATION PERFUSION SCAN. 10/12/2021 TECHNIQUE: 63 millicuries aerosolized Tc99m DTPA was administered via mask prior to planar imaging of the lungs in multiple projections. Then, 7.5 millicuries of Tc 14H MAA was administered intravenously prior to planar imaging of the lungs in similar projections. COMPARISON: Chest radiograph 10/11/2021. HISTORY: ORDERING SYSTEM PROVIDED HISTORY: elevated d dimer exclude PE TECHNOLOGIST PROVIDED HISTORY: Reason for exam:->elevated d dimer exclude PE What reading provider will be dictating this exam?->CRC FINDINGS: PERFUSION: Mildly heterogeneous distribution of radiotracer. No unmatched segmental or subsegmental perfusion defects. VENTILATION: Heterogeneous distribution of radiotracer. There is some central airway deposition as can be seen with obstructive airways disease. CHEST RADIOGRAPH: No focal areas of consolidation or significant effusions on recent chest radiograph. Very low probability for pulmonary embolism. XR CHEST PORTABLE    Result Date: 10/13/2021  EXAMINATION: ONE XRAY VIEW OF THE CHEST 10/13/2021 2:23 pm COMPARISON: October 11, 2021 HISTORY: ORDERING SYSTEM PROVIDED HISTORY: wheezing TECHNOLOGIST PROVIDED HISTORY: Reason for exam:->wheezing What reading provider will be dictating this exam?->CRC FINDINGS: On today's study the heart silhouette appears to be larger than previous. There also appears to be mild increase in pulmonary vascularity which could suggest mild vascular congestion. Trachea is midline. No effusions are seen. No pneumothorax is identified.   Visualized bony thorax shows no acute abnormality. Cardiac silhouette appears somewhat larger although this is a portable image and there also appears to be slight increase in pulmonary vascularity. No other acute findings identified. US RETROPERITONEAL COMPLETE    Result Date: 10/12/2021  EXAMINATION: RETROPERITONEAL ULTRASOUND OF THE KIDNEYS AND URINARY BLADDER 10/12/2021 COMPARISON: None HISTORY: ORDERING SYSTEM PROVIDED HISTORY: acute kidney injury TECHNOLOGIST PROVIDED HISTORY: Reason for exam:->acute kidney injury What reading provider will be dictating this exam?->CRC FINDINGS: Kidneys: The right kidney measures 10.6 x 6.5 x 5.4 cm and the left kidney measures 12.5 x 4.6 x 6 cm Kidneys demonstrate normal cortical echogenicity. No evidence of hydronephrosis or intrarenal stones. 1.7 cm simple appearing cyst identified in the left and right midpole. No hydronephrosis. No renal calculus identified Simple appearing cysts in bilateral kidneys. US DUP LOWER EXTREMITIES BILATERAL VENOUS    Result Date: 10/12/2021  EXAMINATION: DUPLEX VENOUS ULTRASOUND OF THE BILATERAL LOWER [id number]/12/2021 1:45 pm TECHNIQUE: Duplex ultrasound using B-mode/gray scaled imaging, Doppler spectral analysis and color flow Doppler was obtained of the deep venous structures of the lower bilateral extremities. COMPARISON: None. HISTORY: ORDERING SYSTEM PROVIDED HISTORY: elevated d dimer assess for DVT TECHNOLOGIST PROVIDED HISTORY: Reason for exam:->elevated d dimer assess for DVT What reading provider will be dictating this exam?->CRC FINDINGS: The visualized veins of the bilateral lower extremities are patent and free of echogenic thrombus. The veins demonstrate good compressibility with normal color flow study and spectral analysis. No evidence of DVT in either lower extremity.          EKG: See Report  Echo: See Report      IMPRESSIONS:  Active Problems:    Acute kidney injury ([address]    Hyponatremia    Metabolic acidosis Acute respiratory distress  Resolved Problems:    * No resolved hospital problems. *      RECOMMENDATIONS:  At this time it appears that Mr. David Kim indeed has severe triple-vessel coronary artery disease with a left preponderance system. He has significant calcification in the proximal aspect of his left anterior descending artery which also has an eccentric stenosis in its origin as well as a 75 percent segmental stenosis at the level of the first septal  and 75-80 percent ostial segmental diagonal branch which is a small to moderate luminal caliber. Additionally his marginal branch of the dominant circumflex is 100 percent occluded and visualize the intra-coronary collaterals. In the distal most segment of the circumflex artery before the takeoff of the posterior ventricular and posterior descending branches fortunately while his left ventricular systolic function remains near normal unfortunately he has significant diastolic dysfunction with an average left ventricular and diastolic pressure  04-48 mmHg. technically he should be a candidate for coronary bypass surgery however secondary to his body habitus and other comorbid pathologic findings he may be better off with salvage coronary artery angioplasty/stent deployment    I have spent more than 29 minutes face to face with Fredo Mckeon and reviewing notes and laboratory data, with greater than 50% of this time instructing and counseling the patient and family member at his bedside face to face regarding my findings and recommendations and I have answered all questions as posed to me by Jyoti Morales. Fredrick Mitchell, DO FACP,FACC,FSCAI      NOTE:  This report was transcribed using voice recognition software.   Every effort was made to ensure accuracy; however, inadvertent computerized transcription errors may be present

## 2021-10-19 NOTE — PROGRESS NOTES
Physical Therapy  Physical Therapy Daily Treatment Note      Name: Una Fuller  : 1940  MRN: [id number]      Date of Service: 10/19/2021    Evaluating PT:  Crispin Jay PT, ROBERT [id number]     Room #:  5709/3077-H  Diagnosis:  Elevated troponin [R77.8]  Acute kidney injury ([address] [N17.9]  Reason for admission: fatigue, SOB  Precautions:  Falls  Procedure/Surgery:  None   Equipment Recommendations:  FWW     SUBJECTIVE:  Pt lives with wife in a 1 floor condo with no steps to enter. Pt ambulated with no AD vs occasional FWW PTA. OBJECTIVE:   Initial Evaluation  Date: 10/13 Treatment  10/19   Short Term/ Long Term   Goals   AM-PAC 6 Clicks  42/    Was pt agreeable to Eval/treatment? Yes  Yes     Does pt have pain?  Denies  Denies     Bed Mobility  Rolling: NT  Supine to sit: NT  Sit to supine: NT  Scooting: NT Rolling: NT  Supine to sit: NT  Sit to supine: SBA  Scooting: Independent  Independent    Transfers Sit to stand: SBA  Stand to sit: SBA  Stand pivot: NT Sit to stand: SBA  Stand to sit: SBA  Stand pivot: SBA with FWW Independent    Ambulation    100 feet with [employer] supervision  And  10 ft with no AD [place]   150 feet with FWW SBA >200 feet with AAD Mod I vs independent    Stair negotiation: ascended and descended  NT NT NA    ROM BUE:  See OT eval   BLE:  WFL     Strength BUE:  See OT eval   BLE:  knee ext 5/5  Ankle DF 5/5  Increase by 1/3 MMT grade    Balance Sitting EOB:  independent  Dynamic Standing:  SBA Sitting EOB:  independent  Dynamic Standing:  SBA with FWW Sitting EOB:  indep  Dynamic Standing:  indep     -Pt is A & O x 3  -Sensation:  Pt denies numbness and tingling to extremities  -Edema:  unremarkable     Therapeutic Exercises:  Functional activity     Patient education  Pt educated on safety, sequencing of transfers, and role of PT    Patient response to education:   Pt verbalized understanding Pt demonstrated skill Pt requires further education in this area   Yes  Yes  Yes ASSESSMENT:  Conditions Requiring Skilled Therapeutic Intervention:  []Decreased strength     []Decreased ROM  [x]Decreased functional mobility  [x]Decreased balance   [x]Decreased endurance   []Decreased posture  []Decreased sensation  []Decreased coordination   []Decreased vision  []Decreased safety awareness   []Increased pain       Comments:  Pt sitting EOB on arrival.  Demonstrates good balance using FWW around room. Limited to ambulating laps around room d/t contact isolation. Pt reported fatigue so took seated rest in chair. Pt had been sitting in chair since 6 AM so requested back to bed. Performed STS and pivot transfer back to bed. Pt able to scoot himself up in bed. Pt left in chair position. Treatment:  Patient practiced and was instructed in the following treatment:    STS and pivot transfer training - pt educated on proper hand and foot placement, safety and sequencing, and use of FWW to safely complete sit<>stand and pivot transfers with hands on assistance to complete task safely    Gait training- pt was given verbal and tactile cues to facilitate safety/balance and FWW usage during ambulation as well as provided with physical assistance to complete task. PHYSICAL THERAPY PLAN OF CARE:    Pt is making progress towards established goals. Continue PT POC.      Specific instructions for next treatment:  Progress transfers and ambulation distance    Time in  1100  Time out  1130    Total Treatment Time  30 minutes     CPT codes:  [] Low Complexity PT evaluation 57437  [] Moderate Complexity PT evaluation 22159  [] High Complexity PT evaluation 94683  [] PT Re-evaluation 19612  [] Gait training 51933 - minutes  [] Manual therapy 41454 - minutes  [x] Therapeutic activities 85288 30 minutes  [] Therapeutic exercises 30500 - minutes  [] Neuromuscular reeducation 32984 - minutes     Nena Chaves PT, DPT  [id number]

## 2021-10-19 NOTE — PROGRESS NOTES
Ruben Carrillo M.D. Marquis Blood M.D. Edenilson Chaudhari M.D. MAURI KuO. Daily Pulmonary Progress Note    Patient:  Luke Salinas [de-identified] y.o. male MRN: [id number]     Date of Service: 10/19/2021        Subjective      Patient was seen and examined. Had heart catheterization yesterday. Overall feels better. Minimal cough with some sputum production. Objective   Vitals: BP (!) 147/79   Pulse 76   Temp 98 °F (36.7 °C) (Temporal)   Resp 14   Ht 5' 7\" (1.702 m)   Wt 280 lb (127 kg)   SpO2 96%   BMI 43.85 kg/m²     I/O:    Intake/Output Summary (Last 24 hours) at 10/19/2021 1721  Last data filed at 10/19/2021 1330  Gross per 24 hour   Intake 930 ml   Output 600 ml   Net 330 ml       CURRENT MEDS :  Scheduled Meds:   apixaban  5 mg Oral BID    sodium chloride flush  5-40 mL IntraVENous 2 times per day    pantoprazole  40 mg Oral QAM AC    metoprolol succinate  25 mg Oral Daily    amLODIPine  5 mg Oral Daily    levalbuterol  0.63 mg Nebulization TID    Fidaxomicin  200 mg Oral BID    lactobacillus  1 capsule Oral Daily    benzonatate  200 mg Oral TID    insulin lispro  0-12 Units SubCUTAneous TID WC    insulin lispro  0-6 Units SubCUTAneous Nightly    influenza virus vaccine  0.5 mL IntraMUSCular Prior to discharge    rosuvastatin  10 mg Oral QPM       Continuous Infusions:   sodium chloride      dextrose         PRN Meds:  sodium chloride flush, sodium chloride, guaiFENesin-dextromethorphan, regadenoson, temazepam, racepinephrine HCl, sodium chloride nebulizer, glucose, dextrose, glucagon (rDNA), dextrose, perflutren lipid microspheres, ondansetron **OR** ondansetron, polyethylene glycol, acetaminophen **OR** acetaminophen      Physical Exam:  Physical Exam  Constitutional:       General: He is not in acute distress. Appearance: He is obese. HENT:      Head: Normocephalic and atraumatic.       Mouth/Throat:      Pharynx: No oropharyngeal exudate. Eyes:      General: No scleral icterus. Conjunctiva/sclera: Conjunctivae normal.   Neck:      Trachea: No tracheal deviation. Cardiovascular:      Rate and Rhythm: Normal rate. Heart sounds: Normal heart sounds. Pulmonary:      Effort: Pulmonary effort is normal.      Breath sounds: Normal breath sounds. Abdominal:      Palpations: Abdomen is soft. Tenderness: There is no abdominal tenderness. Musculoskeletal:         General: No swelling or deformity. Cervical back: Neck supple. Right lower leg: Edema present. Left lower leg: Edema present. Lymphadenopathy:      Cervical: No cervical adenopathy. Skin:     General: Skin is warm. Findings: No rash. Neurological:      General: No focal deficit present. Mental Status: He is alert and oriented to person, place, and time. Psychiatric:         Behavior: Behavior normal.         Pertinent/ New Labs and Imaging Studies     Pulmonary Function Testing personally reviewed and interpreted. PERTINENT LAB RESULTS: Labs reviewed. DIAGNOSTICS: Pertinent imaging reviewed. Assessment:      1. Generalized weakness and fatigue  2. C. difficile infection -improved  3. SAURABH on CKD -improved  4. Cardiomyopathy with EF 40% -EF improved on catheterization  5. A. fib  6. MV CAD  7. Cough -improved, respiratory culture with NOF  8. Sleep-related breathing disorder suspect OMARI  9. Morbid obesity, BMI 43.85      Plan:      Await final respiratory culture results   Two-view chest x-ray   Mucolytic's, flutter valve   Antibiotic with Dificid for C. Difficile 10-14-day course   Overnight sleep study, pending results we will start Pap therapy   PPI/Eliquis      Thank you for allowing me to participate in the care of Ronit Barry. Please feel free to call with questions.      Electronically signed by Kaya Diego DO on 10/19/2021 at 5:21 PM

## 2021-10-20 ENCOUNTER — APPOINTMENT (OUTPATIENT)
Dept: GENERAL RADIOLOGY | Age: 81
DRG: 287 | End: 2021-10-20
Payer: MEDICARE

## 2021-10-20 LAB
ANION GAP SERPL CALCULATED.3IONS-SCNC: 10 MMOL/L (ref 7–16)
BUN BLDV-MCNC: 18 MG/DL (ref 6–23)
CALCIUM SERPL-MCNC: 9.8 MG/DL (ref 8.6–10.2)
CHLORIDE BLD-SCNC: 102 MMOL/L (ref 98–107)
CO2: 23 MMOL/L (ref 22–29)
CREAT SERPL-MCNC: 1.4 MG/DL (ref 0.7–1.2)
GFR AFRICAN AMERICAN: 59
GFR NON-AFRICAN AMERICAN: 49 ML/MIN/1.73
GLUCOSE BLD-MCNC: 116 MG/DL (ref 74–99)
MAGNESIUM: 2 MG/DL (ref 1.6–2.6)
METER GLUCOSE: 128 MG/DL (ref 74–99)
METER GLUCOSE: 128 MG/DL (ref 74–99)
METER GLUCOSE: 134 MG/DL (ref 74–99)
METER GLUCOSE: 147 MG/DL (ref 74–99)
PHOSPHORUS: 3.4 MG/DL (ref 2.5–4.5)
POTASSIUM SERPL-SCNC: 4 MMOL/L (ref 3.5–5)
SODIUM BLD-SCNC: 135 MMOL/L (ref 132–146)

## 2021-10-20 PROCEDURE — 6370000000 HC RX 637 (ALT 250 FOR IP): Performed by: FAMILY MEDICINE

## 2021-10-20 PROCEDURE — 84100 ASSAY OF PHOSPHORUS: CPT

## 2021-10-20 PROCEDURE — 71046 X-RAY EXAM CHEST 2 VIEWS: CPT

## 2021-10-20 PROCEDURE — 6370000000 HC RX 637 (ALT 250 FOR IP): Performed by: INTERNAL MEDICINE

## 2021-10-20 PROCEDURE — 36415 COLL VENOUS BLD VENIPUNCTURE: CPT

## 2021-10-20 PROCEDURE — 94640 AIRWAY INHALATION TREATMENT: CPT

## 2021-10-20 PROCEDURE — 97535 SELF CARE MNGMENT TRAINING: CPT

## 2021-10-20 PROCEDURE — 93005 ELECTROCARDIOGRAM TRACING: CPT | Performed by: INTERNAL MEDICINE

## 2021-10-20 PROCEDURE — 6370000000 HC RX 637 (ALT 250 FOR IP): Performed by: NURSE PRACTITIONER

## 2021-10-20 PROCEDURE — 95800 SLP STDY UNATTENDED: CPT

## 2021-10-20 PROCEDURE — 82962 GLUCOSE BLOOD TEST: CPT

## 2021-10-20 PROCEDURE — 2060000000 HC ICU INTERMEDIATE R&B

## 2021-10-20 PROCEDURE — 6360000002 HC RX W HCPCS: Performed by: INTERNAL MEDICINE

## 2021-10-20 PROCEDURE — 2580000003 HC RX 258: Performed by: INTERNAL MEDICINE

## 2021-10-20 PROCEDURE — 80048 BASIC METABOLIC PNL TOTAL CA: CPT

## 2021-10-20 PROCEDURE — 83735 ASSAY OF MAGNESIUM: CPT

## 2021-10-20 RX ORDER — BENZONATATE 100 MG/1
200 CAPSULE ORAL 2 TIMES DAILY
Status: DISCONTINUED | OUTPATIENT
Start: 2021-10-20 | End: 2021-10-20

## 2021-10-20 RX ORDER — BENZONATATE 100 MG/1
100 CAPSULE ORAL 3 TIMES DAILY PRN
Status: DISCONTINUED | OUTPATIENT
Start: 2021-10-20 | End: 2021-10-25 | Stop reason: HOSPADM

## 2021-10-20 RX ORDER — SOTALOL HYDROCHLORIDE 80 MG/1
40 TABLET ORAL EVERY 12 HOURS
Status: DISCONTINUED | OUTPATIENT
Start: 2021-10-20 | End: 2021-10-22

## 2021-10-20 RX ADMIN — INSULIN LISPRO 1 UNITS: 100 INJECTION, SOLUTION INTRAVENOUS; SUBCUTANEOUS at 21:56

## 2021-10-20 RX ADMIN — LEVALBUTEROL HYDROCHLORIDE 0.63 MG: 0.63 SOLUTION RESPIRATORY (INHALATION) at 20:09

## 2021-10-20 RX ADMIN — GUAIFENESIN 400 MG: 400 TABLET ORAL at 10:03

## 2021-10-20 RX ADMIN — GUAIFENESIN 400 MG: 400 TABLET ORAL at 20:55

## 2021-10-20 RX ADMIN — LEVALBUTEROL HYDROCHLORIDE 0.63 MG: 0.63 SOLUTION RESPIRATORY (INHALATION) at 08:10

## 2021-10-20 RX ADMIN — LEVALBUTEROL HYDROCHLORIDE 0.63 MG: 0.63 SOLUTION RESPIRATORY (INHALATION) at 13:31

## 2021-10-20 RX ADMIN — PANTOPRAZOLE SODIUM 40 MG: 40 TABLET, DELAYED RELEASE ORAL at 06:21

## 2021-10-20 RX ADMIN — Medication 10 ML: at 10:05

## 2021-10-20 RX ADMIN — Medication 1 CAPSULE: at 10:03

## 2021-10-20 RX ADMIN — SOTALOL HYDROCHLORIDE 40 MG: 80 TABLET ORAL at 15:34

## 2021-10-20 RX ADMIN — GUAIFENESIN 400 MG: 400 TABLET ORAL at 14:11

## 2021-10-20 RX ADMIN — FIDAXOMICIN 200 MG: 200 TABLET, FILM COATED ORAL at 10:02

## 2021-10-20 RX ADMIN — APIXABAN 5 MG: 5 TABLET, FILM COATED ORAL at 10:03

## 2021-10-20 RX ADMIN — ROSUVASTATIN 10 MG: 10 TABLET, FILM COATED ORAL at 18:53

## 2021-10-20 RX ADMIN — Medication 5 ML: at 21:57

## 2021-10-20 RX ADMIN — FIDAXOMICIN 200 MG: 200 TABLET, FILM COATED ORAL at 20:53

## 2021-10-20 RX ADMIN — BENZONATATE 200 MG: 100 CAPSULE ORAL at 10:03

## 2021-10-20 RX ADMIN — METOPROLOL SUCCINATE 25 MG: 25 TABLET, EXTENDED RELEASE ORAL at 10:02

## 2021-10-20 RX ADMIN — APIXABAN 5 MG: 5 TABLET, FILM COATED ORAL at 20:52

## 2021-10-20 RX ADMIN — AMLODIPINE BESYLATE 5 MG: 5 TABLET ORAL at 10:04

## 2021-10-20 ASSESSMENT — PAIN SCALES - GENERAL
PAINLEVEL_OUTOF10: 0
PAINLEVEL_OUTOF10: 0

## 2021-10-20 NOTE — PROGRESS NOTES
PROGRESS NOTE       PATIENT PROBLEM LIST:  Active Problems:    Acute kidney injury ([facility] [address]    Hyponatremia    Metabolic acidosis    Acute respiratory distress  Resolved Problems:    * No resolved hospital problems. *      SUBJECTIVE:  [address] states he is worried and wants to know what is next for him. He wants to know if he indeed continues to test positive for Clostridium difficile and how this will affect his cardiac status. He denies chest discomfort nor significant shortness of breath presently but has not ambulated much whatsoever. He denies any discomfort arising from his cardiac catheterization site. He denies any palpitations. He does remain in atrial fibrillation with a controlled response however. REVIEW OF SYSTEMS:  General ROS: negative for - fatigue, malaise,  weight gain or weight loss  Psychological ROS: positive for - anxiety and depression  Ophthalmic ROS: negative for - decreased vision or visual distortion. ENT ROS: negative  Allergy and Immunology ROS: negative  Hematological and Lymphatic ROS: negative  Endocrine: no heat or cold intolerance and no polyphagia, polydipsia, or polyuria  Respiratory ROS: positive for - cough and shortness of breath  Cardiovascular ROS: positive for - dyspnea on exertion, irregular heartbeat and shortness of breath. Gastrointestinal ROS: no abdominal pain, change in bowel habits, or black or bloody stools  Genito-Urinary ROS: no nocturia, dysuria, trouble voiding, frequency or hematuria  Musculoskeletal ROS: negative for- myalgias, arthralgias, or claudication  Neurological ROS: no TIA or stroke symptoms otherwise no significant change in symptoms or problems since yesterday as documented in previous progress notes.     SCHEDULED MEDICATIONS:   apixaban  5 mg Oral BID    guaiFENesin  400 mg Oral TID    sodium chloride flush  5-40 mL IntraVENous 2 times per day    pantoprazole  40 mg Oral QAM AC    metoprolol succinate  25 mg Oral Daily    amLODIPine  5 mg Oral Daily    levalbuterol  0.63 mg Nebulization TID    Fidaxomicin  200 mg Oral BID    lactobacillus  1 capsule Oral Daily    benzonatate  200 mg Oral TID    insulin lispro  0-12 Units SubCUTAneous TID WC    insulin lispro  0-6 Units SubCUTAneous Nightly    influenza virus vaccine  0.5 mL IntraMUSCular Prior to discharge    rosuvastatin  10 mg Oral QPM       VITAL SIGNS:                                                                                                                          /62   Pulse 74   Temp 98 °F (36.7 °C) (Oral)   Resp 18   Ht 5' 7\" (1.702 m)   Wt 280 lb (127 kg)   SpO2 96%   BMI 43.85 kg/m²   No data found. OBJECTIVE:    HEENT: PERRL, EOM  Intact; sclera non-icteric, conjunctiva pink. Carotids are brisk in upstroke with normal contour. No carotid bruits. Normal jugular venous pulsation at 45°. No palpable cervical nor supraclavicular nodes. Thyroid not palpable. Trachea midline. Chest: Even excursion  Lungs: CTA B, no expiratory wheezes or rhonchi, no decreased tactile fremitus without inspiratory rales. Heart: Regular  rhythm; S1 > S2, no gallop or murmur. No clicks, rub, palpable thrills   or heaves. PMI nondisplaced, 5th intercostal space MCL. Abdomen: Soft, nontender, nondistended, + + grossly protuberant, no masses or organomegaly. Bowel sounds active.right femoral catheterization site without hematoma  Extremities: Without clubbing, cyanosis or edema. Pulses present 3+ upper extermities bilaterally; present 1+ DP and present 1+ PT bilaterally.      Data:   Scheduled Meds: Reviewed  Continuous Infusions:    sodium chloride      dextrose         Intake/Output Summary (Last 24 hours) at 10/20/2021 1318  Last data filed at 10/19/2021 1846  Gross per 24 hour   Intake 380 ml   Output --   Net 380 ml     CBC:   Recent Labs     10/19/21  1559   WBC 8.6   HGB 11.5*   HCT 37.1        BMP:  Recent Labs     10/18/21  1343 10/18/21  1343 10/19/21  0645 10/19/21  0645 10/20/21  0606     --  142  --  135   K 4.1  --  4.4  --  4.0     --  107  --  102   CO2 22  --  22  --  23   BUN 19  --  17  --  18   CREATININE 1.4*  --  1.3*  --  1.4*   LABGLOM 49   < > 53   < > 49    < > = values in this interval not displayed. ABGs:   Lab Results   Component Value Date    PH 7.410 10/13/2021    PO2 75.6 10/13/2021    PCO2 29.6 10/13/2021     INR: No results for input(s): INR in the last 72 hours. PRO-BNP:   Lab Results   Component Value Date    PROBNP 10,291 (H) 10/16/2021    PROBNP 11,016 (H) 10/13/2021      TSH:   Lab Results   Component Value Date    TSH 3.320 10/18/2021      Cardiac Injury Profile:   Lab Results   Component Value Date    TROPHS 165 (H) 10/12/2021      Lipid Profile:   Lab Results   Component Value Date    TRIG 281 10/13/2021    HDL 11 10/13/2021    LDLCALC 30 10/13/2021    CHOL 97 10/13/2021      Hemoglobin A1C: No components found for: HGBA1C      RAD:   Echo Complete    Result Date: 10/15/2021  Transthoracic Echocardiography Report (TTE)  Demographics   Patient Name    Alma Rosa Tatum Gender            Male                  A   Medical Record  [id number]     Room Number       8506  Number   Account #       [de-identified]    Procedure Date    10/14/2021   Corporate ID                 Ordering          Carlitos Arreguin MD                               Physician   Accession       [id number]   Referring  Number                       Physician   Date of Birth   1940   [address]   Age             [de-identified] year(s)   Interpreting      [address]                               Physician         Physician Cardiology                                                 Amanda Gr MD                                Any Other  Procedure Type of Study   TTE procedure:Echo Complete W/Doppler & Color Flow.   Procedure Date Date: 10/14/2021 Start: 03:36 PM Study Location: Portable Technical Quality: Adequate visualization Indications:Congestive heart failure. Patient Status: Routine Contrast Medium: Definity. Height: 67 inches Weight: 280 pounds BSA: 2.33 m^2 BMI: 43.85 kg/m^2 BP: 152/79 mmHg  Findings   Left Ventricle  Moderate asymmetric septal hypertrophy septal wall 1.6cm. Mild global LV hypokineses  Ejection fraction is visually estimated at 40 to 45%. Right Ventricle  Normal right ventricular size and function. Left Atrium  The left atrium is mildly dilated. Right Atrium  Mildly enlarged right atrium size. Mitral Valve  Mild mitral regurgitation is present. Tricuspid Valve  Mild tricuspid regurgitation. Aortic Valve  The aortic valve appears mildly sclerotic. No hemodynamically significant aortic stenosis is present. Pulmonic Valve  The pulmonic valve was not well visualized. Pericardial Effusion  Fat pad present. Conclusions   Summary  Moderate asymmetric septal hypertrophy septal wall 1.6cm. Mild global LV hypokineses  Ejection fraction is visually estimated at 40 to 45%. Normal right ventricular size and function. Mildly enlarged right atrium size. Mild mitral regurgitation is present. The aortic valve appears mildly sclerotic. No hemodynamically significant aortic stenosis is present. Mild tricuspid regurgitation.    Signature   ----------------------------------------------------------------  Electronically signed by Bryan Olsen MD(Interpreting  physician) on 10/15/2021 12:36 PM  ----------------------------------------------------------------  M-Mode/2D Measurements & Calculations   LV Diastolic    LV Systolic Dimension: 3.9   AV Cusp Separation: 1.3 cmLA  Dimension: 4.9  cm                           Dimension: 4 cmAO Root  cm              LV Volume Diastolic: 471.4   Dimension: 3.6 cm  LV FS:20.4 %    ml  LV PW           LV Volume Systolic: 15.1 ml  Diastolic: 1.3  LV EDV/LV EDV Index: 113.9  cm              ml/49 ml/m^2LV ESV/LV ESV    RV Diastolic Dimension: 2.5  Septum          Index: 67.1 ml/29ml/ m^2     cm  Diastolic: 1.6  EF Calculated: 41.1 %  cm              LV Mass Index: 128 l/min*m^2 Ascending Aorta: 2.7 cm                  LV Length: 8.9 cm            LA volume/Index: 87.8 ml  LV Mass: 297.54                              /37.53ml/m^2  g               LVOT: 2.1 cm                 RA Area: 18.7 cm^2  Doppler Measurements & Calculations   MV Peak E-Wave: 1.12 AV Peak Velocity:     LVOT Peak Velocity: 0.94 m/s  m/s                  1.92 m/s              LVOT Mean Velocity: 0.71 m/s  MV Peak A-Wave: 0.29 AV Peak Gradient:     LVOT Peak Gradient: 3.5  m/s                  14.71 mmHg            mmHgLVOT Mean Gradient: 2.2  MV E/A Ratio: 3.89   AV Mean Velocity:     mmHg  MV Peak Gradient:    1.52 m/s              Estimated RVSP: 15.3 mmHg  4.8 mmHg             AV Mean Gradient: 9.8 Estimated RAP:3 mmHg  MV Mean Gradient:    mmHg  1.6 mmHg             AV VTI: 34.2 cm  MV Mean Velocity:    AV Area               TR Velocity:1.76 m/s  0.57 m/s             (Continuity):1.63     TR Gradient:12.35 mmHg  MV Deceleration      cm^2                  PV Peak Velocity: 1.31 m/s  Time: 178.2 msec                           PV Peak Gradient: 6.83 mmHg  MV P1/2t: 50.8 msec  LVOT VTI: 16.1 cm     PV Mean Velocity: 0.8 m/s  MVA by PHT:4.33 cm^2                       PV Mean Gradient: 2.9 mmHg  MV Area              Estimated PASP: 15.35  (continuity): 2.4    mmHg  cm^2  MV E' Septal  Velocity: 0.05 m/s  MV E' Lateral  Velocity: 10 m/s  [id number]    XR CHEST (2 VW)    Result Date: 10/20/2021  EXAMINATION: TWO XRAY VIEWS OF THE CHEST 10/20/2021 10:28 am COMPARISON: 10/13/2021 HISTORY: ORDERING SYSTEM PROVIDED HISTORY: cough TECHNOLOGIST PROVIDED HISTORY: Reason for exam:->cough What reading provider will be dictating this exam?->CRC FINDINGS: There is a right lung base infiltrate.   Right upper areas of consolidation or significant effusions on recent chest radiograph. Very low probability for pulmonary embolism. XR CHEST PORTABLE    Result Date: 10/13/2021  EXAMINATION: ONE XRAY VIEW OF THE CHEST 10/13/2021 2:23 pm COMPARISON: October 11, 2021 HISTORY: ORDERING SYSTEM PROVIDED HISTORY: wheezing TECHNOLOGIST PROVIDED HISTORY: Reason for exam:->wheezing What reading provider will be dictating this exam?->CRC FINDINGS: On today's study the heart silhouette appears to be larger than previous. There also appears to be mild increase in pulmonary vascularity which could suggest mild vascular congestion. Trachea is midline. No effusions are seen. No pneumothorax is identified. Visualized bony thorax shows no acute abnormality. Cardiac silhouette appears somewhat larger although this is a portable image and there also appears to be slight increase in pulmonary vascularity. No other acute findings identified. US RETROPERITONEAL COMPLETE    Result Date: 10/12/2021  EXAMINATION: RETROPERITONEAL ULTRASOUND OF THE KIDNEYS AND URINARY BLADDER 10/12/2021 COMPARISON: None HISTORY: ORDERING SYSTEM PROVIDED HISTORY: acute kidney injury TECHNOLOGIST PROVIDED HISTORY: Reason for exam:->acute kidney injury What reading provider will be dictating this exam?->CRC FINDINGS: Kidneys: The right kidney measures 10.6 x 6.5 x 5.4 cm and the left kidney measures 12.5 x 4.6 x 6 cm Kidneys demonstrate normal cortical echogenicity. No evidence of hydronephrosis or intrarenal stones. 1.7 cm simple appearing cyst identified in the left and right midpole. No hydronephrosis. No renal calculus identified Simple appearing cysts in bilateral kidneys.      US DUP LOWER EXTREMITIES BILATERAL VENOUS    Result Date: 10/12/2021  EXAMINATION: DUPLEX VENOUS ULTRASOUND OF THE BILATERAL LOWER ANZCVTOMCEY13/12/2021 1:45 pm TECHNIQUE: Duplex ultrasound using B-mode/gray scaled imaging, Doppler spectral analysis and color flow Doppler was obtained of the deep venous structures of the lower bilateral extremities. COMPARISON: None. HISTORY: ORDERING SYSTEM PROVIDED HISTORY: elevated d dimer assess for DVT TECHNOLOGIST PROVIDED HISTORY: Reason for exam:->elevated d dimer assess for DVT What reading provider will be dictating this exam?->CRC FINDINGS: The visualized veins of the bilateral lower extremities are patent and free of echogenic thrombus. The veins demonstrate good compressibility with normal color flow study and spectral analysis. No evidence of DVT in either lower extremity. EKG: See Report  Echo: See Report      IMPRESSIONS:  Active Problems:    Acute kidney injury ([address]    Hyponatremia    Metabolic acidosis    Acute respiratory distress  Resolved Problems:    * No resolved hospital problems. *      RECOMMENDATIONS:  I have attempted to coordinate efforts at the request of his family to have his films reviewed for a second opinion as to high risk angioplasty in the presence of a diabetic with triple vessel disease versus high risk surgery considering his body habitus and multisystem disease state. They requested transfer to the [facility] for further delineation of his care. A repeat C. Difficile laboratory tests will be obtained. I have spent more than 30 minutes face to face with Soumya Gastelum and reviewing notes and laboratory data, with greater than 50% of this time instructing and counseling the patient and his family members face to face regarding my findings and recommendations and I have answered all questions as posed to me by Mr. Simba Hall as well as his daughter, son and son-in-law. Colleen Boyd, DO FACP,FACC,FSCAI      NOTE:  This report was transcribed using voice recognition software.   Every effort was made to ensure accuracy; however, inadvertent computerized transcription errors may be present

## 2021-10-20 NOTE — PROGRESS NOTES
PROGRESS NOTE       PATIENT PROBLEM LIST:  Active Problems:    Acute kidney injury ([facility] [address]    Hyponatremia    Metabolic acidosis    Acute respiratory distress  Resolved Problems:    * No resolved hospital problems. *      SUBJECTIVE:  Caty Kelly states he feels somewhat better and less short of breath but had a large loose stool last evening. He denies any discomfort arising from his cardiac catheterization site nor chest discomfort presently and is not aware of any new side effects from his present medical regimen. REVIEW OF SYSTEMS:  General ROS: negative for - fatigue, malaise,  weight gain or weight loss  Psychological ROS: positive for - anxiety and depression  Ophthalmic ROS: negative for - decreased vision or visual distortion. ENT ROS: negative  Allergy and Immunology ROS: negative  Hematological and Lymphatic ROS: negative  Endocrine: no heat or cold intolerance and no polyphagia, polydipsia, or polyuria  Respiratory ROS: positive for - cough and shortness of breath  Cardiovascular ROS: positive for - irregular heartbeat and shortness of breath. Gastrointestinal ROS: no abdominal pain, change in bowel habits, or black or bloody stools  Genito-Urinary ROS: no nocturia, dysuria, trouble voiding, frequency or hematuria  Musculoskeletal ROS: negative for- myalgias, arthralgias, or claudication  Neurological ROS: no TIA or stroke symptoms otherwise no significant change in symptoms or problems since yesterday as documented in previous progress notes.     SCHEDULED MEDICATIONS:   apixaban  5 mg Oral BID    guaiFENesin  400 mg Oral TID    sodium chloride flush  5-40 mL IntraVENous 2 times per day    pantoprazole  40 mg Oral QAM AC    metoprolol succinate  25 mg Oral Daily    amLODIPine  5 mg Oral Daily    levalbuterol  0.63 mg Nebulization TID    Fidaxomicin  200 mg Oral BID    lactobacillus  1 capsule Oral Daily    benzonatate  200 mg Oral TID    insulin lispro  0-12 Units SubCUTAneous TID   insulin lispro  0-6 Units SubCUTAneous Nightly    influenza virus vaccine  0.5 mL IntraMUSCular Prior to discharge    rosuvastatin  10 mg Oral QPM       VITAL SIGNS:                                                                                                                          BP (!) 147/79   Pulse 76   Temp 98 °F (36.7 °C) (Temporal)   Resp 22   Ht 5' 7\" (1.702 m)   Wt 280 lb (127 kg)   SpO2 95%   BMI 43.85 kg/m²   No data found. OBJECTIVE:    HEENT: PERRL, EOM  Intact; sclera non-icteric, conjunctiva pink. Carotids are brisk in upstroke with normal contour. No carotid bruits. Normal jugular venous pulsation at 45°. No palpable cervical nor supraclavicular nodes. Thyroid not palpable. Trachea midline. Chest: Even excursion  Lungs: CTA B, no expiratory wheezes or rhonchi, no decreased tactile fremitus without inspiratory rales. Heart: Regular  rhythm; S1 > S2, no gallop or murmur. No clicks, rub, palpable thrills   or heaves. PMI nondisplaced, 5th intercostal space MCL. Abdomen: Soft, nontender, nondistended,  grossly protuberant, no masses or organomegaly. Bowel sounds active. Extremities: Without clubbing, cyanosis or edema. Pulses present 3+ upper extermities bilaterally; present 1+ DP and present 1+ PT bilaterally.      Data:   Scheduled Meds: Reviewed  Continuous Infusions:    sodium chloride      dextrose         Intake/Output Summary (Last 24 hours) at 10/19/2021 2339  Last data filed at 10/19/2021 1846  Gross per 24 hour   Intake 880 ml   Output --   Net 880 ml     CBC:   Recent Labs     10/17/21  0900 10/19/21  1559   WBC 10.0 8.6   HGB 12.0* 11.5*   HCT 38.0 37.1    322     BMP:  Recent Labs     10/17/21  0900 10/17/21  0900 10/18/21  1343 10/18/21  1343 10/19/21  0645     --  137  --  142   K 3.8  --  4.1  --  4.4   CL 98  --  101  --  107   CO2 22  --  22  --  22   BUN 21  --  19  --  17   CREATININE 1.3*  --  1.4*  --  1.3*   LABGLOM 53   < > 49   < > 53    < > = values in this interval not displayed. ABGs:   Lab Results   Component Value Date    PH 7.410 10/13/2021    PO2 75.6 10/13/2021    PCO2 29.6 10/13/2021     INR: No results for input(s): INR in the last 72 hours. PRO-BNP:   Lab Results   Component Value Date    PROBNP 10,291 (H) 10/16/2021    PROBNP 11,016 (H) 10/13/2021      TSH:   Lab Results   Component Value Date    TSH 3.320 10/18/2021      Cardiac Injury Profile:   Lab Results   Component Value Date    TROPHS 165 (H) 10/12/2021      Lipid Profile:   Lab Results   Component Value Date    TRIG 281 10/13/2021    HDL 11 10/13/2021    LDLCALC 30 10/13/2021    CHOL 97 10/13/2021      Hemoglobin A1C: No components found for: HGBA1C      RAD:   Echo Complete    Result Date: 10/15/2021  Transthoracic Echocardiography Report (TTE)  Demographics   Patient Name    [facility] Gender            Male                  A   Medical Record  [id number]     Room Number       8506  Number   Account #       [de-identified]    Procedure Date    10/14/2021   Corporate ID                 Ordering          Sabino Calvo MD                               Physician   Accession       [id number]   Referring  Number                       Physician   Date of Birth   1940   [address] [facility]   Age             [de-identified] year(s)   Interpreting      [address] [place] [address]                               Physician         Physician Cardiology                                                 Curt Gr MD                                Any Other  Procedure Type of Study   TTE procedure:Echo Complete W/Doppler & Color Flow. Procedure Date Date: 10/14/2021 Start: 03:36 PM Study Location: Portable Technical Quality: Adequate visualization Indications:Congestive heart failure. Patient Status: Routine Contrast Medium: Definity.  Height: 67 inches Weight: 280 pounds BSA: 2.33 m^2 BMI: 43.85 kg/m^2 BP: 152/79 mmHg  Findings   Left Ventricle  Moderate asymmetric septal hypertrophy septal wall 1.6cm.  Mild global LV hypokineses  Ejection fraction is visually estimated at 40 to 45%. Right Ventricle  Normal right ventricular size and function. Left Atrium  The left atrium is mildly dilated. Right Atrium  Mildly enlarged right atrium size. Mitral Valve  Mild mitral regurgitation is present. Tricuspid Valve  Mild tricuspid regurgitation. Aortic Valve  The aortic valve appears mildly sclerotic. No hemodynamically significant aortic stenosis is present. Pulmonic Valve  The pulmonic valve was not well visualized. Pericardial Effusion  Fat pad present. Conclusions   Summary  Moderate asymmetric septal hypertrophy septal wall 1.6cm. Mild global LV hypokineses  Ejection fraction is visually estimated at 40 to 45%. Normal right ventricular size and function. Mildly enlarged right atrium size. Mild mitral regurgitation is present. The aortic valve appears mildly sclerotic. No hemodynamically significant aortic stenosis is present. Mild tricuspid regurgitation.    Signature   ----------------------------------------------------------------  Electronically signed by Clari Cristobal MD(Interpreting  physician) on 10/15/2021 12:36 PM  ----------------------------------------------------------------  M-Mode/2D Measurements & Calculations   LV Diastolic    LV Systolic Dimension: 3.9   AV Cusp Separation: 1.3 cmLA  Dimension: 4.9  cm                           Dimension: 4 cmAO Root  cm              LV Volume Diastolic: 550.4   Dimension: 3.6 cm  LV FS:20.4 %    ml  LV PW           LV Volume Systolic: 32.2 ml  Diastolic: 1.3  LV EDV/LV EDV Index: 113.9  cm              ml/49 ml/m^2LV ESV/LV ESV    RV Diastolic Dimension: 2.5  Septum          Index: 67.1 ml/29ml/ m^2     cm  Diastolic: 1.6  EF Calculated: 41.1 %  cm              LV Mass Index: 128 l/min*m^2 Ascending Aorta: 2.7 cm                  LV Length: 8.9 cm            LA volume/Index: 87.8 ml  LV Mass: 297.54 /37.53ml/m^2  g               LVOT: 2.1 cm                 RA Area: 18.7 cm^2  Doppler Measurements & Calculations   MV Peak E-Wave: 1.12 AV Peak Velocity:     LVOT Peak Velocity: 0.94 m/s  m/s                  1.92 m/s              LVOT Mean Velocity: 0.71 m/s  MV Peak A-Wave: 0.29 AV Peak Gradient:     LVOT Peak Gradient: 3.5  m/s                  14.71 mmHg            mmHgLVOT Mean Gradient: 2.2  MV E/A Ratio: 3.89   AV Mean Velocity:     mmHg  MV Peak Gradient:    1.52 m/s              Estimated RVSP: 15.3 mmHg  4.8 mmHg             AV Mean Gradient: 9.8 Estimated RAP:3 mmHg  MV Mean Gradient:    mmHg  1.6 mmHg             AV VTI: 34.2 cm  MV Mean Velocity:    AV Area               TR Velocity:1.76 m/s  0.57 m/s             (Continuity):1.63     TR Gradient:12.35 mmHg  MV Deceleration      cm^2                  PV Peak Velocity: 1.31 m/s  Time: 178.2 msec                           PV Peak Gradient: 6.83 mmHg  MV P1/2t: 50.8 msec  LVOT VTI: 16.1 cm     PV Mean Velocity: 0.8 m/s  MVA by PHT:4.33 cm^2                       PV Mean Gradient: 2.9 mmHg  MV Area              Estimated PASP: 15.35  (continuity): 2.4    mmHg  cm^2  MV E' Septal  Velocity: 0.05 m/s  MV E' Lateral  Velocity: 10 m/s  [email or web address] [id number]    XR CHEST (2 VW)    Result Date: 10/11/2021  EXAMINATION: TWO XRAY VIEWS OF THE CHEST 10/11/2021 11:41 am COMPARISON: 09/18/2014 HISTORY: ORDERING SYSTEM PROVIDED HISTORY: cp TECHNOLOGIST PROVIDED HISTORY: Reason for exam:->cp What reading provider will be dictating this exam?->CRC FINDINGS: Heart size is normal.  There are no infiltrates or effusions. Normal chest     XR ABDOMEN (KUB) (SINGLE AP VIEW)    Result Date: 10/13/2021  EXAMINATION: ONE SUPINE XRAY VIEW(S) OF THE ABDOMEN 10/13/2021 8:41 pm COMPARISON: None.  HISTORY: ORDERING SYSTEM PROVIDED HISTORY: ileus TECHNOLOGIST PROVIDED HISTORY: Reason for exam:->ileus What reading provider will be dictating this exam?->CRC FINDINGS: The bowel gas pattern is nonobstructive. Status post cholecystectomy and posterior body fusion at L5-S1. Nonobstructive bowel gas pattern. No evidence of ileus. NM LUNG VENT/PERFUSION (VQ)    Result Date: 10/12/2021  EXAMINATION: NUCLEAR MEDICINE VENTILATION PERFUSION SCAN. 10/12/2021 TECHNIQUE: 63 millicuries aerosolized Tc99m DTPA was administered via mask prior to planar imaging of the lungs in multiple projections. Then, 7.5 millicuries of Tc 81H MAA was administered intravenously prior to planar imaging of the lungs in similar projections. COMPARISON: Chest radiograph 10/11/2021. HISTORY: ORDERING SYSTEM PROVIDED HISTORY: elevated d dimer exclude PE TECHNOLOGIST PROVIDED HISTORY: Reason for exam:->elevated d dimer exclude PE What reading provider will be dictating this exam?->CRC FINDINGS: PERFUSION: Mildly heterogeneous distribution of radiotracer. No unmatched segmental or subsegmental perfusion defects. VENTILATION: Heterogeneous distribution of radiotracer. There is some central airway deposition as can be seen with obstructive airways disease. CHEST RADIOGRAPH: No focal areas of consolidation or significant effusions on recent chest radiograph. Very low probability for pulmonary embolism. XR CHEST PORTABLE    Result Date: 10/13/2021  EXAMINATION: ONE XRAY VIEW OF THE CHEST 10/13/2021 2:23 pm COMPARISON: October 11, 2021 HISTORY: ORDERING SYSTEM PROVIDED HISTORY: wheezing TECHNOLOGIST PROVIDED HISTORY: Reason for exam:->wheezing What reading provider will be dictating this exam?->CRC FINDINGS: On today's study the heart silhouette appears to be larger than previous. There also appears to be mild increase in pulmonary vascularity which could suggest mild vascular congestion. Trachea is midline. No effusions are seen. No pneumothorax is identified.   Visualized bony thorax shows no acute abnormality. Cardiac silhouette appears somewhat larger although this is a portable image and there also appears to be slight increase in pulmonary vascularity. No other acute findings identified. US RETROPERITONEAL COMPLETE    Result Date: 10/12/2021  EXAMINATION: RETROPERITONEAL ULTRASOUND OF THE KIDNEYS AND URINARY BLADDER 10/12/2021 COMPARISON: None HISTORY: ORDERING SYSTEM PROVIDED HISTORY: acute kidney injury TECHNOLOGIST PROVIDED HISTORY: Reason for exam:->acute kidney injury What reading provider will be dictating this exam?->CRC FINDINGS: Kidneys: The right kidney measures 10.6 x 6.5 x 5.4 cm and the left kidney measures 12.5 x 4.6 x 6 cm Kidneys demonstrate normal cortical echogenicity. No evidence of hydronephrosis or intrarenal stones. 1.7 cm simple appearing cyst identified in the left and right midpole. No hydronephrosis. No renal calculus identified Simple appearing cysts in bilateral kidneys. US DUP LOWER EXTREMITIES BILATERAL VENOUS    Result Date: 10/12/2021  EXAMINATION: DUPLEX VENOUS ULTRASOUND OF THE BILATERAL LOWER [id number]/12/2021 1:45 pm TECHNIQUE: Duplex ultrasound using B-mode/gray scaled imaging, Doppler spectral analysis and color flow Doppler was obtained of the deep venous structures of the lower bilateral extremities. COMPARISON: None. HISTORY: ORDERING SYSTEM PROVIDED HISTORY: elevated d dimer assess for DVT TECHNOLOGIST PROVIDED HISTORY: Reason for exam:->elevated d dimer assess for DVT What reading provider will be dictating this exam?->CRC FINDINGS: The visualized veins of the bilateral lower extremities are patent and free of echogenic thrombus. The veins demonstrate good compressibility with normal color flow study and spectral analysis. No evidence of DVT in either lower extremity.          EKG: See Report  Echo: See Report      IMPRESSIONS:  Active Problems:    Acute kidney injury ([address]    Hyponatremia    Metabolic acidosis    Acute respiratory distress  Resolved Problems:    * No resolved hospital problems. *      RECOMMENDATIONS:  Awaiting delineation if C. Difficile infection is now resolved and then move ahead to address his significant cardiac issues. fortunately renal function remained stable following cardiac catheterization. The question  presently is whether he would be a better candidate with underlying severe triple vessel coronary artery disease and underlying insulin-dependent diabetes. I am concerned that he should wait for surgery until he is definitely clear of C. Difficile were technically undergo a high risk catheter-based multivessel coronary artery intervention. I have spent more than when he 25 minutes face to face with Karol Joyce and reviewing notes and laboratory data, with greater than 50% of this time instructing and counseling the patient  face to face regarding my findings and recommendations and I have answered all questions as posed to me by Mr. Meaghan Mcdonnell. Sylvia Enriquez, DO FACP,FACC,[facility]      NOTE:  This report was transcribed using voice recognition software.   Every effort was made to ensure accuracy; however, inadvertent computerized transcription errors may be present

## 2021-10-20 NOTE — PROGRESS NOTES
OT BEDSIDE TREATMENT NOTE     [address] [facility] [address] [place] [address]      APAD:  Patient Name: Antonette Barnard  MRN: [id number]  : 1940  Room: [address] [place] [address]     Per OT Eval:   Evaluating OT: Kami Garcia OTR/L #[id number]     Referring Provider and Specific Provider Orders/Date:      10/13/21 0945   OT eval and treat Start: 10/13/21 0945, End: 10/13/21 0945, ONE TIME, Standing Count: 1 Occurrences, R      Kelsie Zaragoza MD         Diagnosis: Elevated troponin [R77.8]  Acute kidney injury ([facility] [address] [N17.9]      Surgery: None this admission       Pertinent Medical History:  has a past medical history of Acute pancreatitis, Diabetes mellitus ([facility] [address] History of snoring, HTN (hypertension), Hyperlipidemia, Osteoarthritis, Preoperative clearance, Preoperative clearance, Prostate cancer ([facility] [address] and Renal insufficiency. Precautions:  Fall Risk      Assessment of current deficits   [x]? Functional mobility           [x]? ADLs           [x]? Strength                  []?Cognition   [x]? Functional transfers         [x]? IADLs         [x]? Safety Awareness   []? Endurance   []? Fine Coordination                         [x]? Balance      []? Vision/perception   []? Sensation     []? Gross Motor Coordination             []? ROM           []?  Delirium                   []? Motor Control      OT PLAN OF CARE   OT POC based on physician orders, patient diagnosis and results of clinical assessment     Frequency/Duration:  1-3 days/wk for 2 weeks PRN   Specific OT Treatment to include:   * Instruction/training on adapted ADL techniques and AE recommendations to increase functional independence within precautions       * Training on energy conservation strategies, correct breathing pattern and techniques to improve independence/tolerance for self-care routine  * Functional transfer/mobility training/DME recommendations for increased independence, safety, and fall prevention  * Patient/Family education to increase follow through with safety techniques and functional independence  * Recommendation of environmental modifications for increased safety with functional transfers/mobility and ADLs  * Cognitive retraining/development of therapeutic activities to improve problem solving, judgement, memory, and attention for increased safety/participation in ADL/IADL tasks  * Therapeutic exercise to improve motor endurance, ROM, and functional strength for ADLs/functional transfers  * Therapeutic activities to facilitate/challenge dynamic balance, stand tolerance for increased safety and independence with ADLs     Recommended Adaptive Equipment: TBD - pt would benefit from shower chair if plans to return home       Home Living: with wife; single family home, 1 story, No steps to enter.    Bathroom set-up: tub/shower ?          Equipment owned: cane, wheeled walker, 3:1 commode.      Prior Level of Function: Pt reports being independent with ADLs , spouse completes most IADLs; ambulated with both cane and walker as needed, per pt's report.      Driving: Yes   Occupation: Retired   Enjoys: Being with his grandchildren       Pain Level: Pt denied pain this date.                Cognition: A&O: 4/4; Follows 2 step directions             Memory: intact              Sequencing: good             Problem solving: good              Judgement/safety: good      Functional Assessment:  AM-PAC Daily Activity Raw Score: 20/24    Initial Eval Status  Date: 10/14/21 Treatment Status  Date:10/20/21 STGs = LTGs  Time frame: 10-14 days   Feeding Supervision     indep  Independent    Grooming Supervision   Pt completed oral hygiene and face wash while standing at bathroom sink with forearms resting on countertop for support.  indep Moderate [place]    UB Dressing Minimal Assist   Pt doffed/donned gown while seated in chair with assist for tele/IV management and to tie around neck.   SBA toileting, supervision all aspects    Mobility training-  Instruction/training on safety and improved independence with bed mobility/functional transfers  and functional mobility.      Comments: Upon arrival pt in bed. Wife present in room. Pt has made significant improvements with ADLs/functional transfers and mobility. . Pt educated on adapted techniques to complete  ADL tasks with improved independence, safety, prevent falls and allow pt to return to prior level of function. At end of session, pt sitting EOB with RN present, with all lines and tubes intact, call light within reach. · Pt has made good progress towards set goals.    · Continue with current plan of care      Treatment Time In:2:55            Treatment Time Out: 3:13               Treatment Charges: Mins Units   Ther Ex  22027     Manual Therapy [id number]     Thera Activities [id number]     ADL/Home t 58108 18 1   Neuro Re-ed [id number]     Group Therapy      Orthotic manage/training  [id number]     Non-Billable Time     Total Timed Treatment 18 [address] [place], [place] [address]

## 2021-10-20 NOTE — PROGRESS NOTES
LATE ENTRY: Attempted to apply bedside sleep study equipment on 10/19 at 2200. The patient was in the restroom/told technician he was not going to be done for some time. There were 2 out patients scheduled in [facility]. Tech had to leave to attend to arriving patients.

## 2021-10-20 NOTE — PROGRESS NOTES
Subjective: The patient is awake and alert. No problems overnight. Denies chest pain, angina, and dyspnea. Denies abdominal pain. Tolerating diet. No nausea or vomiting. awaiting possible transfer to [facility]. Objective:    BP (!) 158/61   Pulse 66   Temp 97 °F (36.1 °C) (Temporal)   Resp 16   Ht 5' 7\" (1.702 m)   Wt 280 lb (127 kg)   SpO2 96%   BMI 43.85 kg/m²     Heart:  IRRR, no murmurs, gallops, or rubs.   Lungs:  CTA bilaterally, no wheeze, rales or rhonchi  Abd: bowel sounds present, nontender, nondistended, no masses  Extrem:  No clubbing, cyanosis, or edema    CBC with Differential:    Lab Results   Component Value Date    WBC 8.6 10/19/2021    RBC 4.35 10/19/2021    HGB 11.5 10/19/2021    HCT 37.1 10/19/2021     10/19/2021    MCV 85.3 10/19/2021    MCH 26.4 10/19/2021    MCHC 31.0 10/19/2021    RDW 14.6 10/19/2021    BANDSPCT 1 09/26/2014    METASPCT 1.7 10/19/2021    LYMPHOPCT 12.2 10/19/2021    MONOPCT 8.7 10/19/2021    MYELOPCT 0.9 10/19/2021    BASOPCT 2.6 10/19/2021    MONOSABS 0.77 10/19/2021    LYMPHSABS 1.29 10/19/2021    EOSABS 0.00 10/19/2021    BASOSABS 0.22 10/19/2021     CMP:    Lab Results   Component Value Date     10/20/2021    K 4.0 10/20/2021    K 4.6 10/12/2021     10/20/2021    CO2 23 10/20/2021    BUN 18 10/20/2021    CREATININE 1.4 10/20/2021    GFRAA 59 10/20/2021    LABGLOM 49 10/20/2021    GLUCOSE 116 10/20/2021    PROT 7.4 10/12/2021    LABALBU 3.4 10/12/2021    CALCIUM 9.8 10/20/2021    BILITOT 0.4 10/12/2021    ALKPHOS 110 10/12/2021    AST 89 10/12/2021    ALT 29 10/12/2021    cardiac catheterization shows multivessel coronary disease and ejection fraction 55%    Assessment:    Patient Active Problem List   Diagnosis    HTN (hypertension)    Diabetes ([facility] [address]    Hyperlipidemia    Renal insufficiency    Osteoarthritis, knee    Acute kidney injury ([facility] [address]    Hyponatremia    Metabolic acidosis    Acute respiratory distress   coronary artery disease    Plan:  ?  Transfer to [facility] for CABG        Tyra Merlos MD  4:56 PM  10/20/2021

## 2021-10-20 NOTE — PROGRESS NOTES
[facility]  Nephrology Attending Progress Note          SUBJECTIVE:     10/20/21: Alert, talkative, denies sob. Expressing concerns over possible heart surgery. PROBLEM LIST:    Patient Active Problem List   Diagnosis    HTN (hypertension)    [facility] [address]    Hyperlipidemia    Renal insufficiency    Osteoarthritis, knee    Acute kidney injury ([facility] [address]    Hyponatremia    Metabolic acidosis    Acute respiratory distress        PAST MEDICAL HISTORY:    Past Medical History:   Diagnosis Date    Acute pancreatitis     Diabetes mellitus ([facility] [address]     History of snoring     HTN (hypertension)     Hyperlipidemia     Osteoarthritis     Preoperative clearance 8/29/2014    cardiac- Dr. Debra Fernandez; note in epic for surgery 9/23/2014    Preoperative clearance 9-19-14    medical clearance for OR 9-23-14 from Dr. Nneka Doss on paper chart    Prostate cancer [facility]) 1999    radiation, seed implant    Renal insufficiency        DIET:    ADULT DIET;  Regular     PHYSICAL EXAM:     Patient Vitals for the past 24 hrs:   BP Temp Temp src Pulse Resp SpO2   10/20/21 1331 -- -- -- -- 18 99 %   10/20/21 0811 -- -- -- -- 18 96 %   10/20/21 0800 136/62 98 °F (36.7 °C) Oral -- 21 95 %   10/19/21 2043 138/74 98 °F (36.7 °C) Oral 74 22 95 %   10/19/21 1500 (!) 147/79 98 °F (36.7 °C) Temporal 76 14 96 %   @      Intake/Output Summary (Last 24 hours) at 10/20/2021 1403  Last data filed at 10/19/2021 1846  Gross per 24 hour   Intake 200 ml   Output --   Net 200 ml         Wt Readings from Last 3 Encounters:   10/11/21 280 lb (127 kg)   09/18/14 265 lb (120.2 kg)   08/28/14 265 lb 8 oz (120.4 kg)       General appearance: alert, in no acute distress  Skin: No rashes or lesions on exposed skin  Neck: No JVD  Lungs: Clear upper, diminished in bases  Heart: irregularly irregular  Abdomen: Soft, non-tender, + bowel sounds  Extremities: BLE edema  Neurologic: Mental status: Alert, oriented, thought content appropriate        MEDS (scheduled):    benzonatate  200 mg Oral BID    sotalol  40 mg Oral Q12H    apixaban  5 mg Oral BID    guaiFENesin  400 mg Oral TID    sodium chloride flush  5-40 mL IntraVENous 2 times per day    pantoprazole  40 mg Oral QAM AC    amLODIPine  5 mg Oral Daily    levalbuterol  0.63 mg Nebulization TID    Fidaxomicin  200 mg Oral BID    lactobacillus  1 capsule Oral Daily    insulin lispro  0-12 Units SubCUTAneous TID WC    insulin lispro  0-6 Units SubCUTAneous Nightly    influenza virus vaccine  0.5 mL IntraMUSCular Prior to discharge    rosuvastatin  10 mg Oral QPM       MEDS (infusions):   sodium chloride      dextrose         MEDS (prn):  sodium chloride flush, sodium chloride, guaiFENesin-dextromethorphan, regadenoson, temazepam, racepinephrine HCl, sodium chloride nebulizer, glucose, dextrose, glucagon (rDNA), dextrose, perflutren lipid microspheres, ondansetron **OR** [DISCONTINUED] ondansetron, polyethylene glycol, acetaminophen **OR** acetaminophen    DATA:    Recent Labs     10/19/21  1559   WBC 8.6   HGB 11.5*   HCT 37.1   MCV 85.3        Recent Labs     10/18/21  1343 10/19/21  0645 10/20/21  0606    142 135   K 4.1 4.4 4.0    107 102   CO2 22 22 23   BUN 19 17 18   CREATININE 1.4* 1.3* 1.4*   LABGLOM 49 53 49   GLUCOSE 152* 107* 116*   CALCIUM 9.5 9.7 9.8   MG  --  2.1 2.0   PHOS  --  3.1 3.4       Lab Results   Component Value Date    LABALBU 3.4 (L) 10/12/2021    LABALBU 3.5 10/11/2021    LABALBU 3.5 09/25/2014    LABALBU 2.8 (L) 09/25/2014     Lab Results   Component Value Date    TSH 3.320 10/18/2021       Iron Studies  Lab Results   Component Value Date    IRON 43 (L) 10/18/2021    TIBC 289 10/18/2021    FERRITIN 389 10/18/2021     Vitamin B-12   Date Value Ref Range Status   09/25/2014 225 211 - 946 pg/mL Final     Folate   Date Value Ref Range Status   09/25/2014 13.0 7.3 - 26.1 ng/mL Final       Vit D, 25-Hydroxy   Date Value Ref Range Status   10/17/2021 12 (L) 30 - 100 ng/mL Final     Comment:     <20 ng/mL. ........... Juana Sharma Deficient  20-30 ng/mL. ......... Juana Sharma Insufficient   ng/mL. ........ Juana Sharma Sufficient  >100 ng/mL. .......... Juana Sharma Toxic       PTH   Date Value Ref Range Status   09/25/2014 42 15 - 65 pg/mL Final       No components found for: URIC    Lab Results   Component Value Date    COLORU Yellow 10/12/2021    NITRU Negative 10/12/2021    GLUCOSEU Negative 10/12/2021    KETUA Negative 10/12/2021    UROBILINOGEN 0.2 10/12/2021    BILIRUBINUR SMALL 10/12/2021       No results found for: LIPIDPAN      IMPRESSION/RECOMMENDATIONS:         1. Hyponatremia  Na 129-->133-->132-->133-->137-->142  Resolved      2. SAURABH  Baseline from 2014 of 1.5  Cr now 2.9>2.6.->2.0-->1.6-->1.4 -->1.3 under baseline  No hydro on shirley  Check daily bmp  Holding cozaar  Sp bactrim   Stop IVF     3. Shortness of breath-  Pulmonology following also  Cardiology following  S/p cardiac cath and is to have CABG     4. Hypertension-  Controlled at goal<130/80  Off  cozaar  On norvasc     5. Metabolic acidosis  In setting of ckd  Stable  off IVF  Resolved         Krystian Cardenas, APRN - CNS     Patient seen and examined all key components of the physical performed independently , case discussed with NP, all pertinent labs and radiologic tests personally reviewed agree with above.       Amauri Polanco MD

## 2021-10-20 NOTE — PROCEDURES
[address]                  [address] [facility] [place],  [facility]                            CARDIAC CATHETERIZATION    PATIENT NAME: Kristan Mariano                     :        1940  MED REC NO:   [id number]                            ROOM:       8506  ACCOUNT NO:   [de-identified]                           ADMIT DATE: 10/11/2021  PROVIDER:     Bandar Nevarez DO    DATE OF PROCEDURE:  10/18/2021    SCAI indicator 4, score of 8. PRESSURES:  /58, mean 92, /22. SUMMARY OF INJECTIONS:  II.  Selective coronary arteriograms. a. Right coronary artery nondominant, a congenitally small vessel with  multiple areas of 85% stenosis seen proximally in the mid third segment  before the takeoff of the right ventricular branches. The distal vessel  was of smaller luminal caliber, but patent. b.  Left coronary - preponderant. 1.  Left main trunk. 10%-15% distal tapering stenosis. 2.  Left anterior descending what appears to be a high-grade eccentric  stenosis in the origin best visualized in the caudal cranial CONCEPCION  Projection including evidence for moderate calcification followed by a 75%-80% stenosis at the level of the first  septal . Immediately prior to this, there is a takeoff of the  diagonal branch, which has 75%-80% stenosis followed by a second area in  the mid segment of 75%-80% stenosis. The distal vessel was  small-to-moderate, luminal caliber. The distal anterior descending  artery is of moderate luminal caliber and widely patent. III. Circumflex. a. Dominant vessel giving rise to the posterior descending and  posterior ventricular branches. In the first marginal branch, there was  subtotal occlusion with the distal vessel appearing to have a stenosis  at the junction of the proximal mid third segment. The distal vessel  was grossly under profuse.   In the distal circumflex, there was a  75%-80% stenosis leading into the bifurcation takeoff of the posterior  ventricular and posterior descending branches. In the posterior  ventricular branch, there appears to be moderate stenosis as well. III. Injection of left subclavian artery    The left subclavian artery is a large luminal caliber giving rise to a large left internal mammary artery. IV. Left ventricular angiogram.  Left ventricular cavity size is upper  limits of normal with global normal left ventricular systolic function. Estimated left ventricular ejection fraction 55%. CONCLUSIONS:  1. Severely stenotic native coronary atherosclerosis with coronary  artery calcification. 2.  Well-preserved left ventricular systolic function with abnormal  diastolic dysfunction. NB difficulty was encountered in cannulating and  maintaining the left coronary catheter in the os of the left main  coronary artery in addition to the patient's body habitus. 3.  Widely patent large lumen left subclavian and left internal mammary arteries. ESTIMATED BLOOD LOSS:  Less than 10 mL. CONTRAST UTILIZED:  134 mL. FLUOROSCOPY TIME:  9.4 minutes. CONSCIOUS SEDATION:  Completion of conscious sedation 16:01 and  initiation of conscious sedation 15:25.         COLUMBA HUYNH DO    D: 10/20/2021 10:21:34       T: 10/20/2021 10:24:41     [email or web address]  Job#: [id number]     Doc#: [id number]    CC:

## 2021-10-21 PROBLEM — K85.90 PANCREATITIS DUE TO BILIARY OBSTRUCTION: Status: ACTIVE | Noted: 2021-10-18

## 2021-10-21 PROBLEM — I25.10 MULTIPLE VESSEL CORONARY ARTERY DISEASE: Status: ACTIVE | Noted: 2021-10-19

## 2021-10-21 PROBLEM — K83.1 PANCREATITIS DUE TO BILIARY OBSTRUCTION: Status: ACTIVE | Noted: 2021-10-18

## 2021-10-21 PROBLEM — A04.72 CLOSTRIDIOIDES DIFFICILE DIARRHEA: Status: ACTIVE | Noted: 2021-10-15

## 2021-10-21 LAB
ANION GAP SERPL CALCULATED.3IONS-SCNC: 13 MMOL/L (ref 7–16)
BUN BLDV-MCNC: 17 MG/DL (ref 6–23)
CALCIUM SERPL-MCNC: 9.6 MG/DL (ref 8.6–10.2)
CHLORIDE BLD-SCNC: 103 MMOL/L (ref 98–107)
CO2: 22 MMOL/L (ref 22–29)
CREAT SERPL-MCNC: 1.3 MG/DL (ref 0.7–1.2)
GFR AFRICAN AMERICAN: >60
GFR NON-AFRICAN AMERICAN: 53 ML/MIN/1.73
GLUCOSE BLD-MCNC: 112 MG/DL (ref 74–99)
HCT VFR BLD CALC: 36.3 % (ref 37–54)
HEMOGLOBIN: 11.2 G/DL (ref 12.5–16.5)
MCH RBC QN AUTO: 26.2 PG (ref 26–35)
MCHC RBC AUTO-ENTMCNC: 30.9 % (ref 32–34.5)
MCV RBC AUTO: 85 FL (ref 80–99.9)
METER GLUCOSE: 110 MG/DL (ref 74–99)
METER GLUCOSE: 125 MG/DL (ref 74–99)
METER GLUCOSE: 141 MG/DL (ref 74–99)
METER GLUCOSE: 150 MG/DL (ref 74–99)
METER GLUCOSE: 178 MG/DL (ref 74–99)
MYCOPLASMA PNEUMONIAE IGM: NORMAL
PDW BLD-RTO: 14.6 FL (ref 11.5–15)
PLATELET # BLD: 297 E9/L (ref 130–450)
PMV BLD AUTO: 10.8 FL (ref 7–12)
POTASSIUM SERPL-SCNC: 4.2 MMOL/L (ref 3.5–5)
RBC # BLD: 4.27 E12/L (ref 3.8–5.8)
SODIUM BLD-SCNC: 138 MMOL/L (ref 132–146)
WBC # BLD: 7.7 E9/L (ref 4.5–11.5)

## 2021-10-21 PROCEDURE — 2580000003 HC RX 258: Performed by: INTERNAL MEDICINE

## 2021-10-21 PROCEDURE — 2060000000 HC ICU INTERMEDIATE R&B

## 2021-10-21 PROCEDURE — 6370000000 HC RX 637 (ALT 250 FOR IP): Performed by: NURSE PRACTITIONER

## 2021-10-21 PROCEDURE — 6370000000 HC RX 637 (ALT 250 FOR IP): Performed by: INTERNAL MEDICINE

## 2021-10-21 PROCEDURE — 36415 COLL VENOUS BLD VENIPUNCTURE: CPT

## 2021-10-21 PROCEDURE — 94640 AIRWAY INHALATION TREATMENT: CPT

## 2021-10-21 PROCEDURE — 99222 1ST HOSP IP/OBS MODERATE 55: CPT | Performed by: INTERNAL MEDICINE

## 2021-10-21 PROCEDURE — 94660 CPAP INITIATION&MGMT: CPT

## 2021-10-21 PROCEDURE — 85027 COMPLETE CBC AUTOMATED: CPT

## 2021-10-21 PROCEDURE — 6360000002 HC RX W HCPCS: Performed by: INTERNAL MEDICINE

## 2021-10-21 PROCEDURE — 80048 BASIC METABOLIC PNL TOTAL CA: CPT

## 2021-10-21 PROCEDURE — 82962 GLUCOSE BLOOD TEST: CPT

## 2021-10-21 PROCEDURE — 6370000000 HC RX 637 (ALT 250 FOR IP): Performed by: FAMILY MEDICINE

## 2021-10-21 RX ORDER — INSULIN GLARGINE 100 [IU]/ML
10 INJECTION, SOLUTION SUBCUTANEOUS NIGHTLY
Status: DISCONTINUED | OUTPATIENT
Start: 2021-10-21 | End: 2021-10-21

## 2021-10-21 RX ADMIN — LEVALBUTEROL HYDROCHLORIDE 0.63 MG: 0.63 SOLUTION RESPIRATORY (INHALATION) at 09:37

## 2021-10-21 RX ADMIN — APIXABAN 5 MG: 5 TABLET, FILM COATED ORAL at 09:18

## 2021-10-21 RX ADMIN — PANTOPRAZOLE SODIUM 40 MG: 40 TABLET, DELAYED RELEASE ORAL at 06:45

## 2021-10-21 RX ADMIN — INSULIN LISPRO 1 UNITS: 100 INJECTION, SOLUTION INTRAVENOUS; SUBCUTANEOUS at 20:57

## 2021-10-21 RX ADMIN — AMLODIPINE BESYLATE 5 MG: 5 TABLET ORAL at 09:18

## 2021-10-21 RX ADMIN — GUAIFENESIN 400 MG: 400 TABLET ORAL at 09:18

## 2021-10-21 RX ADMIN — GUAIFENESIN 400 MG: 400 TABLET ORAL at 20:52

## 2021-10-21 RX ADMIN — FIDAXOMICIN 200 MG: 200 TABLET, FILM COATED ORAL at 09:17

## 2021-10-21 RX ADMIN — ROSUVASTATIN 10 MG: 10 TABLET, FILM COATED ORAL at 17:40

## 2021-10-21 RX ADMIN — Medication 10 ML: at 09:18

## 2021-10-21 RX ADMIN — GUAIFENESIN 400 MG: 400 TABLET ORAL at 15:10

## 2021-10-21 RX ADMIN — LEVALBUTEROL HYDROCHLORIDE 0.63 MG: 0.63 SOLUTION RESPIRATORY (INHALATION) at 17:14

## 2021-10-21 RX ADMIN — Medication 1 CAPSULE: at 09:18

## 2021-10-21 RX ADMIN — Medication 5 ML: at 21:54

## 2021-10-21 RX ADMIN — LEVALBUTEROL HYDROCHLORIDE 0.63 MG: 0.63 SOLUTION RESPIRATORY (INHALATION) at 21:59

## 2021-10-21 RX ADMIN — APIXABAN 5 MG: 5 TABLET, FILM COATED ORAL at 20:52

## 2021-10-21 RX ADMIN — SOTALOL HYDROCHLORIDE 40 MG: 80 TABLET ORAL at 02:42

## 2021-10-21 RX ADMIN — FIDAXOMICIN 200 MG: 200 TABLET, FILM COATED ORAL at 20:52

## 2021-10-21 ASSESSMENT — PAIN SCALES - GENERAL
PAINLEVEL_OUTOF10: 0
PAINLEVEL_OUTOF10: 0

## 2021-10-21 NOTE — CONSULTS
ENDOCRINOLOGY INITIAL CONSULTATION NOTE      Date of admission: 10/11/2021  Date of service: 10/21/2021  Admitting physician: Romero Chen MD   Primary Care Physician: Romero Chen MD  Consultant physician: Arpit Avalos MD     Reason for the consultation:  Uncontrolled DM    History of Present Illness: The history is provided by the patient. Accuracy of the patient data is excellent    Tara August is a very pleasant [de-identified] y.o. old male with PMH DM type 2, pancreatitis , HLD, and other listed below admitted to [facility] on 10/11/2021 because of generalized weakness and found to have SAURABH and hyponatremia, endocrine service was consulted for diabetes management. Admission labs Na  129>132, Bicarb 17, Cr 2.9>2.6, Ca 9.5, WBC 9.8, Hb 13.3, Plt 199      Prior to admission  The patient was diagnosed with type 2 DM many years ago. Prior to admission patient was on Levemir 25 U BID, Novlog sliding scale . Patient has had no hypoglycemic episodes. Patient has not been eating consistent carbohydrate meals, self-blood glucose monitoring has been variable prior to admission.  The patient is due for yearly diabetic eye exam.   Lab Results   Component Value Date    LABA1C 6.4 10/12/2021       Inpatient diet:   Carb Restricted diet     Point of care glucose monitoring   (Independently reviewed)   Recent Labs     10/20/21  0536 10/20/21  1127 10/20/21  1736 10/20/21  1937 10/21/21  0643 10/21/21  1229 10/21/21  1740 10/21/21  1940   GLUMET 128* 134* 128* 147* 125* 150* 110* 178*       Past medical history:   Past Medical History:   Diagnosis Date    Acute pancreatitis     Diabetes mellitus ([facility] [address]     History of snoring     HTN (hypertension)     Hyperlipidemia     Osteoarthritis     Preoperative clearance 8/29/2014    cardiac- Dr. Jossie Garcia; note in epic for surgery 9/23/2014    Preoperative clearance 9-19-14    medical clearance for OR 9-23-14 from Dr. Lucille Kehr on paper chart    Prostate cancer [facility]) 1999    radiation, seed implant  Renal insufficiency        Past surgical history:  Past Surgical History:   Procedure Laterality Date    BELPHAROPTOSIS REPAIR Bilateral 2014    CATARACT REMOVAL WITH IMPLANT Right 10/2012    CATARACT REMOVAL WITH IMPLANT Left 2012    CHEST SURGERY  1985    benign lumpectomy    CHOLECYSTECTOMY      KNEE SURGERY Left     meniscus repair    KNEE SURGERY Right 1992    meniscus repair    SPINE SURGERY      lower spine fx, disc repair, [facility]. Dr. Tiny Rebollar       Social history:   Tobacco:   reports that he has never smoked. He has never used smokeless tobacco.  Alcohol:   reports current alcohol use. Drugs:   reports no history of drug use.     Family history:    Family History   Problem Relation Age of Onset    Heart Disease Father          age 72       Allergy and drug reactions:   No Known Allergies    Scheduled Meds:   insulin glargine  10 Units SubCUTAneous Nightly    [START ON 10/22/2021] insulin lispro  3 Units SubCUTAneous TID WC    [START ON 10/22/2021] insulin lispro  0-6 Units SubCUTAneous TID WC    insulin lispro  0-3 Units SubCUTAneous Nightly    sotalol  40 mg Oral Q12H    apixaban  5 mg Oral BID    guaiFENesin  400 mg Oral TID    sodium chloride flush  5-40 mL IntraVENous 2 times per day    pantoprazole  40 mg Oral QAM AC    amLODIPine  5 mg Oral Daily    levalbuterol  0.63 mg Nebulization TID    Fidaxomicin  200 mg Oral BID    lactobacillus  1 capsule Oral Daily    influenza virus vaccine  0.5 mL IntraMUSCular Prior to discharge    rosuvastatin  10 mg Oral QPM       PRN Meds:   benzonatate, 100 mg, TID PRN  sodium chloride flush, 5-40 mL, PRN  sodium chloride, 25 mL, PRN  guaiFENesin-dextromethorphan, 5 mL, Q4H PRN  regadenoson, 0.4 mg, ONCE PRN  temazepam, 15 mg, Nightly PRN  racepinephrine HCl, 11.25 mg, Q4H PRN  sodium chloride nebulizer, 3 mL, Q4H PRN  glucose, 15 g, PRN  dextrose, 12.5 g, PRN  glucagon (rDNA), 1 mg, PRN  dextrose, 100 mL/hr, PRN  perflutren lipid microspheres, 1.5 mL, ONCE PRN  ondansetron, 4 mg, Q8H PRN  polyethylene glycol, 17 g, Daily PRN  acetaminophen, 650 mg, Q6H PRN   Or  acetaminophen, 650 mg, Q6H PRN      Continuous Infusions:   sodium chloride      dextrose         Review of Systems  All systems reviewed. All negative except for symptoms mentioned in HPI     OBJECTIVE    BP (!) 122/90   Pulse 65   Temp 98.4 °F (36.9 °C) (Temporal)   Resp 19   Ht 5' 7\" (1.702 m)   Wt 280 lb (127 kg)   SpO2 97%   BMI 43.85 kg/m²     Intake/Output Summary (Last 24 hours) at 10/21/2021 1959  Last data filed at 10/21/2021 1200  Gross per 24 hour   Intake 240 ml   Output --   Net 240 ml     Physical examination:  Due to this being a TeleHealth encounter, evaluation of the following organ systems is limited: Vitals/Constitutional/EENT/Resp/CV/GI//MS/Neuro/Skin/Heme-Lymph-Imm. Modified physical exam through Telemedicine camera    General: Communicating well via camera   Neck: no obvious neck mass. No obvious neck deformity     CVS: no distress   Chest: no distress. Chest is moving with respiration    Extremities:  no visible tremor  Skin: No visible rashes as seen from camera   Musculoskeletal: no visible deformity  Neuro: Alert and oriented to person, place, and time. Psychiatric: Normal mood and affect.  Behavior is normal      Review of Laboratory Data:  I personally reviewed the following labs:   Recent Labs     10/19/21  1559 10/21/21  0720   WBC 8.6 7.7   RBC 4.35 4.27   HGB 11.5* 11.2*   HCT 37.1 36.3*   MCV 85.3 85.0   MCH 26.4 26.2   MCHC 31.0* 30.9*   RDW 14.6 14.6    297   MPV 11.2 10.8     Recent Labs     10/19/21  0645 10/20/21  0606 10/21/21  0720    135 138   K 4.4 4.0 4.2    102 103   CO2 22 23 22   BUN 17 18 17   CREATININE 1.3* 1.4* 1.3*   GLUCOSE 107* 116* 112*   CALCIUM 9.7 9.8 9.6     No results found for: BHYDRXBUT  Lab Results   Component Value Date    LABA1C 6.4 10/12/2021    LABA1C 7.7 09/24/2014     Lab Results   Component Value Date/Time    TSH 3.320 10/18/2021 01:43 PM     Lab Results   Component Value Date    LABA1C 6.4 10/12/2021    GLUCOSE 112 10/21/2021    MALBCR 28.4 09/24/2014    LABMICR 27.8 09/24/2014    LABCREA 249 10/13/2021     Lab Results   Component Value Date    TRIG 281 10/13/2021    HDL 11 10/13/2021    LDLCALC 30 10/13/2021    CHOL 97 10/13/2021       Blood culture   Lab Results   Component Value Date    BC 5 Days- no growth 09/25/2014       Radiology:  XR CHEST (2 VW)   Final Result   1. Right lower lobe infiltrate         XR ABDOMEN (KUB) (SINGLE AP VIEW)   Final Result   Nonobstructive bowel gas pattern. No evidence of ileus. XR CHEST PORTABLE   Final Result   Cardiac silhouette appears somewhat larger although this is a portable image   and there also appears to be slight increase in pulmonary vascularity. No   other acute findings identified. US DUP LOWER EXTREMITIES BILATERAL VENOUS   Final Result   No evidence of DVT in either lower extremity. NM LUNG VENT/PERFUSION (VQ)   Final Result   Very low probability for pulmonary embolism. US RETROPERITONEAL COMPLETE   Final Result   No hydronephrosis. No renal calculus identified      Simple appearing cysts in bilateral kidneys.          XR CHEST (2 VW)   Final Result   Normal chest             Medical Records/Labs/Images review:   I personally reviewed and summarized previous records   All labs and imaging were reviewed independently     [facility], a [de-identified] y.o.-old male seen today for inpatient diabetes management     Diabetes Mellitus type 2   · Currently require much less insulin due to SAURABH   · For now, will change diabetes regimen to:  · Low dose sliding scale with meals and at night   · Continue glucose check with meals and at bedtime   · Will titrate insulin dose based on the blood glucose trend & insulin requirement  · Pt will follow with us after discharge. Endocrine follow up visit, Thursday 11/11 at 12:45PM     SAURABH    Management per primary and nephrology    Patient is at risk for hypoglycemia while receiving insulin due to SAURABH    Continue to monitor blood glucose closely    Hyponatremia   · Improved     Evaluation for hypogonadism   · This will be better than as an outpatient   · Pt will follow with us few weeks after discharge. Endocrine follow up visit, Thursday 11/11 at 12:45PM        The above issues were reviewed with the patient who understood and agreed with the plan. Thank you for allowing us to participate in the care of this patient. Please do not hesitate to contact us with any additional questions. Tiny Vasquez MD  Endocrinologist, [facility] and Endocrinology   [address] [place] [address]   Phone: [phone]  Fax: [phone]  --------------------------------------------  An electronic signature was used to authenticate this note.  Ayan Jacobs MD on 10/21/2021 at 7:59 PM

## 2021-10-21 NOTE — PROGRESS NOTES
[facility]  Nephrology Attending Progress Note          SUBJECTIVE:     10/21/21: Feeling well earlier, now c/o room being too warm. PROBLEM LIST:    Patient Active Problem List   Diagnosis    HTN (hypertension)    Diabetes mellitus type 2, insulin dependent ([address]    Hyperlipidemia    Renal insufficiency    Osteoarthritis, knee    [facility] ([facility] [address]    Hyponatremia    Metabolic acidosis    Acute respiratory distress    Multiple vessel coronary artery disease    Clostridioides difficile diarrhea    history of  pancreatitis due to biliary obstruction        PAST MEDICAL HISTORY:    Past Medical History:   Diagnosis Date    Acute pancreatitis     Diabetes mellitus ([address]     History of snoring     HTN (hypertension)     Hyperlipidemia     Osteoarthritis     Preoperative clearance 8/29/2014    cardiac- Dr. Cristel Jean; note in epic for surgery 9/23/2014    Preoperative clearance 9-19-14    medical clearance for OR 9-23-14 from Dr. Latonia Arambula on paper chart    Prostate cancer [facility]) 1999    radiation, seed implant    Renal insufficiency        DIET:    ADULT DIET;  Regular     PHYSICAL EXAM:     Patient Vitals for the past 24 hrs:   BP Temp Temp src Pulse Resp SpO2   10/21/21 0937 -- -- -- -- 20 --   10/21/21 0814 (!) 140/68 98 °F (36.7 °C) Temporal 54 16 97 %   10/21/21 0230 134/69 97.2 °F (36.2 °C) Temporal 61 17 97 %   10/20/21 2130 135/67 98 °F (36.7 °C) Temporal 66 17 97 %   10/20/21 1530 -- -- -- 66 -- --   10/20/21 1515 (!) 158/61 97 °F (36.1 °C) Temporal 71 16 96 %   @      Intake/Output Summary (Last 24 hours) at 10/21/2021 1509  Last data filed at 10/21/2021 0800  Gross per 24 hour   Intake 120 ml   Output --   Net 120 ml         Wt Readings from Last 3 Encounters:   10/11/21 280 lb (127 kg)   09/18/14 265 lb (120.2 kg)   08/28/14 265 lb 8 oz (120.4 kg)       General appearance: alert, in no acute distress  Skin: No rashes or lesions on exposed skin  Neck: No JVD  Lungs: Clear B-12   Date Value Ref Range Status   09/25/2014 225 211 - 946 pg/mL Final     Folate   Date Value Ref Range Status   09/25/2014 13.0 7.3 - 26.1 ng/mL Final       Vit D, 25-Hydroxy   Date Value Ref Range Status   10/17/2021 12 (L) 30 - 100 ng/mL Final     Comment:     <20 ng/mL. ........... Dandy Lai Deficient  20-30 ng/mL. ......... Dandy Lai Insufficient   ng/mL. ........ Dandy Lai Sufficient  >100 ng/mL. .......... Dandy Lai Toxic       PTH   Date Value Ref Range Status   09/25/2014 42 15 - 65 pg/mL Final       No components found for: URIC    Lab Results   Component Value Date    COLORU Yellow 10/12/2021    NITRU Negative 10/12/2021    GLUCOSEU Negative 10/12/2021    KETUA Negative 10/12/2021    UROBILINOGEN 0.2 10/12/2021    BILIRUBINUR SMALL 10/12/2021       No results found for: LIPIDPAN      IMPRESSION/RECOMMENDATIONS:         1. Hyponatremia  Na 129-->133-->132-->133-->137-->142  Resolved      2. SAURABH  Baseline from 2014 of 1.5  Cr now 2.9>2.6.->2.0-->1.6-->1.4 -->1.3 under baseline  No hydro on shirley  Check daily bmp  Holding cozaar  Sp bactrim   Stop IVF     3. Shortness of breath-  Pulmonology following also  Cardiology following  S/p cardiac cath and is to have CABG     4. Hypertension-  Controlled at goal<130/80  Off  cozaar  On norvasc     5. Metabolic acidosis  In setting of ckd  Stable  off IVF  Resolved         CHOCO Begum - CNS     Patient seen and examined all key components of the physical performed independently , case discussed with NP, all pertinent labs and radiologic tests personally reviewed agree with above.     Awaiting bed fro [facility] transfer    Updated patients daughter( Pediatrician from out of town ) at [address], MD

## 2021-10-21 NOTE — PROGRESS NOTES
Dr Tommy Smyth notified pt HR in low 50s sustained with several 2 second pauses. Per MD okay to hold betapace.

## 2021-10-21 NOTE — PROGRESS NOTES
Subjective: The patient is awake and alert. No problems overnight. Denies chest pain, angina, and dyspnea. Denies abdominal pain. Tolerating diet. No nausea or vomiting. No diarrhea. Awaiting possible transfer to [facility]. Also Sotalol started yesterday. Objective:    BP (!) 140/68   Pulse 54   Temp 98 °F (36.7 °C) (Temporal)   Resp 16   Ht 5' 7\" (1.702 m)   Wt 280 lb (127 kg)   SpO2 97%   BMI 43.85 kg/m²     Heart:  IRRR, no murmurs, gallops, or rubs.   Lungs:  CTA bilaterally, no wheeze, rales or rhonchi  Abd: bowel sounds present, nontender, nondistended, no masses  Extrem:  No clubbing, cyanosis, or edema    CBC with Differential:    Lab Results   Component Value Date    WBC 7.7 10/21/2021    RBC 4.27 10/21/2021    HGB 11.2 10/21/2021    HCT 36.3 10/21/2021     10/21/2021    MCV 85.0 10/21/2021    MCH 26.2 10/21/2021    MCHC 30.9 10/21/2021    RDW 14.6 10/21/2021    BANDSPCT 1 09/26/2014    METASPCT 1.7 10/19/2021    LYMPHOPCT 12.2 10/19/2021    MONOPCT 8.7 10/19/2021    MYELOPCT 0.9 10/19/2021    BASOPCT 2.6 10/19/2021    MONOSABS 0.77 10/19/2021    LYMPHSABS 1.29 10/19/2021    EOSABS 0.00 10/19/2021    BASOSABS 0.22 10/19/2021     CMP:    Lab Results   Component Value Date     10/21/2021    K 4.2 10/21/2021    K 4.6 10/12/2021     10/21/2021    CO2 22 10/21/2021    BUN 17 10/21/2021    CREATININE 1.3 10/21/2021    GFRAA >60 10/21/2021    LABGLOM 53 10/21/2021    GLUCOSE 112 10/21/2021    PROT 7.4 10/12/2021    LABALBU 3.4 10/12/2021    CALCIUM 9.6 10/21/2021    BILITOT 0.4 10/12/2021    ALKPHOS 110 10/12/2021    AST 89 10/12/2021    ALT 29 10/12/2021        Assessment:    Patient Active Problem List   Diagnosis    HTN (hypertension)    Diabetes mellitus type 2, insulin dependent ([facility] [address]    Hyperlipidemia    Renal insufficiency    Osteoarthritis, knee    Acute kidney injury ([facility] [address]    Hyponatremia    Metabolic acidosis    Acute respiratory distress    Multiple vessel coronary artery disease    Clostridioides difficile diarrhea    history of  pancreatitis due to biliary obstruction   Stable    Plan:  ? Transfer to [facility] Wagner MD  8:57 AM  10/21/2021

## 2021-10-21 NOTE — PROGRESS NOTES
Norma Howard M.D. Radha Bustillo M.D. Louisa Deutscher, M.D. Renaldo Perez D.O. Daily Pulmonary Progress Note    Patient:  Medhat Aguirre [de-identified] y.o. male MRN: [id number]     Date of Service: 10/21/2021        Subjective      Patient was seen and examined. Overall feels better. Cough resolved. Sleep study overnight. AHI 30. Objective   Vitals: BP (!) 130/98   Pulse 54   Temp 97.8 °F (36.6 °C) (Temporal)   Resp 19   Ht 5' 7\" (1.702 m)   Wt 280 lb (127 kg)   SpO2 96%   BMI 43.85 kg/m²     I/O:    Intake/Output Summary (Last 24 hours) at 10/21/2021 1726  Last data filed at 10/21/2021 1200  Gross per 24 hour   Intake 240 ml   Output --   Net 240 ml       CURRENT MEDS :  Scheduled Meds:   sotalol  40 mg Oral Q12H    apixaban  5 mg Oral BID    guaiFENesin  400 mg Oral TID    sodium chloride flush  5-40 mL IntraVENous 2 times per day    pantoprazole  40 mg Oral QAM AC    amLODIPine  5 mg Oral Daily    levalbuterol  0.63 mg Nebulization TID    Fidaxomicin  200 mg Oral BID    lactobacillus  1 capsule Oral Daily    insulin lispro  0-12 Units SubCUTAneous TID WC    insulin lispro  0-6 Units SubCUTAneous Nightly    influenza virus vaccine  0.5 mL IntraMUSCular Prior to discharge    rosuvastatin  10 mg Oral QPM       Continuous Infusions:   sodium chloride      dextrose         PRN Meds:  benzonatate, sodium chloride flush, sodium chloride, guaiFENesin-dextromethorphan, regadenoson, temazepam, racepinephrine HCl, sodium chloride nebulizer, glucose, dextrose, glucagon (rDNA), dextrose, perflutren lipid microspheres, ondansetron **OR** [DISCONTINUED] ondansetron, polyethylene glycol, acetaminophen **OR** acetaminophen      Physical Exam:  Physical Exam  Constitutional:       General: He is not in acute distress. Appearance: He is obese. HENT:      Head: Normocephalic and atraumatic. Mouth/Throat:      Pharynx: No oropharyngeal exudate.    Eyes: General: No scleral icterus. Conjunctiva/sclera: Conjunctivae normal.   Neck:      Trachea: No tracheal deviation. Cardiovascular:      Rate and Rhythm: Normal rate. Rhythm irregular. Heart sounds: Normal heart sounds. Pulmonary:      Effort: Pulmonary effort is normal.      Breath sounds: Normal breath sounds. Abdominal:      Palpations: Abdomen is soft. Tenderness: There is no abdominal tenderness. Musculoskeletal:         General: No swelling or deformity. Cervical back: Neck supple. Right lower leg: Edema present. Left lower leg: Edema present. Lymphadenopathy:      Cervical: No cervical adenopathy. Skin:     General: Skin is warm. Findings: No rash. Neurological:      General: No focal deficit present. Mental Status: He is alert and oriented to person, place, and time. Psychiatric:         Behavior: Behavior normal.         Pertinent/ New Labs and Imaging Studies     Pulmonary Function Testing personally reviewed and interpreted. PERTINENT LAB RESULTS: Labs reviewed. DIAGNOSTICS: Pertinent imaging reviewed. Assessment:      1. Generalized weakness and fatigue - improved  2. Severe OMARI AHI 30  3. C. difficile infection -improved  4. SAURABH on CKD -improved  5. Cardiomyopathy with EF 40% -EF improved on catheterization  6. A. fib  7. MV CAD  8. Cough -improved, respiratory culture with NOF  9. Sleep-related breathing disorder suspect OMARI  10. Morbid obesity, BMI 43.85      Plan:      Mucolytic's, flutter valve   Doubt pna, hold on further abx   Antibiotic with Dificid for C. Difficile 10-14-day course   Start PAP qhs   Patient ordered Auto cpap 5-15. Unable to do pap titration at this time due to cardiac illness. Benefits of starting some therapy with Auto PAP outweigh risk of being untreated. If fails then can proceed to auto-bipap due to insurance requirements. Once recovered titration can be done at a later time.    PPI/Eliquis    Will likely transfer to [facility] for advanced cardiac eval and intervention given significant and severe MV CAD    Thank you for allowing me to participate in the care of Ronit Barry. Please feel free to call with questions.      Electronically signed by Aurelia Boswell DO on 10/21/2021 at 5:26 PM

## 2021-10-21 NOTE — PROGRESS NOTES
Kristin Gardner M.D. Lexy Johnston M.D. Venora Simpson, M.D. Femi Stovall D.O. Daily Pulmonary Progress Note    Patient:  Antonette Barnard [de-identified] y.o. male MRN: [id number]     Date of Service: 10/21/2021        Subjective      Patient was seen and examined. Overall feels better. Minimal cough. Sleep study not done overnight. Objective   Vitals: /69   Pulse 61   Temp 97.2 °F (36.2 °C) (Temporal)   Resp 17   Ht 5' 7\" (1.702 m)   Wt 280 lb (127 kg)   SpO2 97%   BMI 43.85 kg/m²     I/O:  No intake or output data in the 24 hours ending 10/21/21 0621    CURRENT MEDS :  Scheduled Meds:   sotalol  40 mg Oral Q12H    apixaban  5 mg Oral BID    guaiFENesin  400 mg Oral TID    sodium chloride flush  5-40 mL IntraVENous 2 times per day    pantoprazole  40 mg Oral QAM AC    amLODIPine  5 mg Oral Daily    levalbuterol  0.63 mg Nebulization TID    Fidaxomicin  200 mg Oral BID    lactobacillus  1 capsule Oral Daily    insulin lispro  0-12 Units SubCUTAneous TID WC    insulin lispro  0-6 Units SubCUTAneous Nightly    influenza virus vaccine  0.5 mL IntraMUSCular Prior to discharge    rosuvastatin  10 mg Oral QPM       Continuous Infusions:   sodium chloride      dextrose         PRN Meds:  benzonatate, sodium chloride flush, sodium chloride, guaiFENesin-dextromethorphan, regadenoson, temazepam, racepinephrine HCl, sodium chloride nebulizer, glucose, dextrose, glucagon (rDNA), dextrose, perflutren lipid microspheres, ondansetron **OR** [DISCONTINUED] ondansetron, polyethylene glycol, acetaminophen **OR** acetaminophen      Physical Exam:  Physical Exam  Constitutional:       General: He is not in acute distress. Appearance: He is obese. HENT:      Head: Normocephalic and atraumatic. Mouth/Throat:      Pharynx: No oropharyngeal exudate. Eyes:      General: No scleral icterus.      Conjunctiva/sclera: Conjunctivae normal.   Neck:      Trachea: No tracheal deviation. Cardiovascular:      Rate and Rhythm: Normal rate. Heart sounds: Normal heart sounds. Pulmonary:      Effort: Pulmonary effort is normal.      Breath sounds: Normal breath sounds. Abdominal:      Palpations: Abdomen is soft. Tenderness: There is no abdominal tenderness. Musculoskeletal:         General: No swelling or deformity. Cervical back: Neck supple. Right lower leg: Edema present. Left lower leg: Edema present. Lymphadenopathy:      Cervical: No cervical adenopathy. Skin:     General: Skin is warm. Findings: No rash. Neurological:      General: No focal deficit present. Mental Status: He is alert and oriented to person, place, and time. Psychiatric:         Behavior: Behavior normal.         Pertinent/ New Labs and Imaging Studies     Pulmonary Function Testing personally reviewed and interpreted. PERTINENT LAB RESULTS: Labs reviewed. DIAGNOSTICS: Pertinent imaging reviewed. Assessment:      1. Generalized weakness and fatigue  2. C. difficile infection -improved  3. SAURABH on CKD -improved  4. Cardiomyopathy with EF 40% -EF improved on catheterization  5. A. fib  6. MV CAD  7. Cough -improved, respiratory culture with NOF  8. Sleep-related breathing disorder suspect OMARI  9. Morbid obesity, BMI 43.85      Plan:      Mucolytic's, flutter valve   Doubt pna, hold on further abx   Antibiotic with Dificid for C. Difficile 10-14-day course   Sleep study when able   PPI/Eliquis    Will likely transfer to [facility] for advanced cardiac eval and intervention given significant and severe MV CAD    Thank you for allowing me to participate in the care of Ronit Barry. Please feel free to call with questions.      Electronically signed by Tiffanie Paul DO on 10/21/2021 at 6:21 AM

## 2021-10-22 LAB
ANION GAP SERPL CALCULATED.3IONS-SCNC: 14 MMOL/L (ref 7–16)
BUN BLDV-MCNC: 18 MG/DL (ref 6–23)
CALCIUM SERPL-MCNC: 9.7 MG/DL (ref 8.6–10.2)
CHLORIDE BLD-SCNC: 104 MMOL/L (ref 98–107)
CO2: 21 MMOL/L (ref 22–29)
CREAT SERPL-MCNC: 1.3 MG/DL (ref 0.7–1.2)
GFR AFRICAN AMERICAN: >60
GFR NON-AFRICAN AMERICAN: 53 ML/MIN/1.73
GLUCOSE BLD-MCNC: 115 MG/DL (ref 74–99)
METER GLUCOSE: 105 MG/DL (ref 74–99)
METER GLUCOSE: 116 MG/DL (ref 74–99)
METER GLUCOSE: 124 MG/DL (ref 74–99)
METER GLUCOSE: 169 MG/DL (ref 74–99)
POTASSIUM SERPL-SCNC: 4 MMOL/L (ref 3.5–5)
SODIUM BLD-SCNC: 139 MMOL/L (ref 132–146)

## 2021-10-22 PROCEDURE — 36415 COLL VENOUS BLD VENIPUNCTURE: CPT

## 2021-10-22 PROCEDURE — 94640 AIRWAY INHALATION TREATMENT: CPT

## 2021-10-22 PROCEDURE — 6370000000 HC RX 637 (ALT 250 FOR IP): Performed by: NURSE PRACTITIONER

## 2021-10-22 PROCEDURE — 82962 GLUCOSE BLOOD TEST: CPT

## 2021-10-22 PROCEDURE — 6370000000 HC RX 637 (ALT 250 FOR IP): Performed by: INTERNAL MEDICINE

## 2021-10-22 PROCEDURE — 99232 SBSQ HOSP IP/OBS MODERATE 35: CPT | Performed by: INTERNAL MEDICINE

## 2021-10-22 PROCEDURE — 2060000000 HC ICU INTERMEDIATE R&B

## 2021-10-22 PROCEDURE — 6370000000 HC RX 637 (ALT 250 FOR IP): Performed by: FAMILY MEDICINE

## 2021-10-22 PROCEDURE — 6360000002 HC RX W HCPCS: Performed by: INTERNAL MEDICINE

## 2021-10-22 PROCEDURE — 87449 NOS EACH ORGANISM AG IA: CPT

## 2021-10-22 PROCEDURE — 2580000003 HC RX 258: Performed by: INTERNAL MEDICINE

## 2021-10-22 PROCEDURE — 87324 CLOSTRIDIUM AG IA: CPT

## 2021-10-22 PROCEDURE — 80048 BASIC METABOLIC PNL TOTAL CA: CPT

## 2021-10-22 PROCEDURE — 94660 CPAP INITIATION&MGMT: CPT

## 2021-10-22 RX ORDER — BUMETANIDE 0.25 MG/ML
0.5 INJECTION, SOLUTION INTRAMUSCULAR; INTRAVENOUS ONCE
Status: COMPLETED | OUTPATIENT
Start: 2021-10-23 | End: 2021-10-23

## 2021-10-22 RX ADMIN — Medication 10 ML: at 09:13

## 2021-10-22 RX ADMIN — APIXABAN 5 MG: 5 TABLET, FILM COATED ORAL at 22:02

## 2021-10-22 RX ADMIN — FIDAXOMICIN 200 MG: 200 TABLET, FILM COATED ORAL at 09:13

## 2021-10-22 RX ADMIN — Medication 10 ML: at 22:03

## 2021-10-22 RX ADMIN — APIXABAN 5 MG: 5 TABLET, FILM COATED ORAL at 09:13

## 2021-10-22 RX ADMIN — LEVALBUTEROL HYDROCHLORIDE 0.63 MG: 0.63 SOLUTION RESPIRATORY (INHALATION) at 20:46

## 2021-10-22 RX ADMIN — PANTOPRAZOLE SODIUM 40 MG: 40 TABLET, DELAYED RELEASE ORAL at 06:17

## 2021-10-22 RX ADMIN — ROSUVASTATIN 10 MG: 10 TABLET, FILM COATED ORAL at 18:14

## 2021-10-22 RX ADMIN — Medication 1 CAPSULE: at 09:13

## 2021-10-22 RX ADMIN — INSULIN LISPRO 1 UNITS: 100 INJECTION, SOLUTION INTRAVENOUS; SUBCUTANEOUS at 12:19

## 2021-10-22 RX ADMIN — GUAIFENESIN 400 MG: 400 TABLET ORAL at 15:22

## 2021-10-22 RX ADMIN — LEVALBUTEROL HYDROCHLORIDE 0.63 MG: 0.63 SOLUTION RESPIRATORY (INHALATION) at 09:45

## 2021-10-22 RX ADMIN — GUAIFENESIN 400 MG: 400 TABLET ORAL at 22:02

## 2021-10-22 RX ADMIN — LEVALBUTEROL HYDROCHLORIDE 0.63 MG: 0.63 SOLUTION RESPIRATORY (INHALATION) at 13:31

## 2021-10-22 RX ADMIN — FIDAXOMICIN 200 MG: 200 TABLET, FILM COATED ORAL at 22:02

## 2021-10-22 RX ADMIN — GUAIFENESIN 400 MG: 400 TABLET ORAL at 09:13

## 2021-10-22 RX ADMIN — AMLODIPINE BESYLATE 5 MG: 5 TABLET ORAL at 09:13

## 2021-10-22 ASSESSMENT — PAIN SCALES - GENERAL
PAINLEVEL_OUTOF10: 0

## 2021-10-22 NOTE — CARE COORDINATION
Chart review completed. 10/18 cardiac cath showing: Severely stenotic native coronary atherosclerosis with coronary artery calcification. Patient started on Sotalol and Eliquis BID. HAs been bradycardic, 40s-50's, down to 30's over night per nursing. Patient on Room air with C-pap over night. Contact Isolation for C-diff continues. OT 20/24, PT 17/24. Referral made to [facility] for transfer. [facility] called for facesheet and negative COVID test to be faxed to them 10/21. Await bed assignment for transition of care. Will continue to follow.      Denver Peaks, RN.  Arnol Giles  [phone]

## 2021-10-22 NOTE — PROGRESS NOTES
[address]                  [address] [facility],  [facility]                               SLEEP STUDY REPORT    PATIENT NAME: Tasha Villalpando                     :        1940  MED REC NO:   [id number]                            ROOM:       8506  ACCOUNT NO:   [de-identified]                           ADMIT DATE: 10/11/2021  PROVIDER:     Raissa Flores MD    DATE OF STUDY:  10/20/2021    BEDSIDE FOUR-CHANNEL SLEEP STUDY    INDICATION FOR STUDY:  The patient with excessive daytime sleeping,  snoring, restless sleep, awakes with headaches, periods of fatigue,  struggles to stay awake during daytime. PAST MEDICAL HISTORY:  Significant for coronary artery disease,  diabetes, obesity, hypertension, SAURABH. The patient has a BMI of 45, has a neck circumference of 19, and an  [place] Sleepiness Scale of 5/24. FINDINGS:  As follows:  Evaluation started at 08:27 p.m., ended at 06:28  a.m. Total duration was 10 hours and 1 minute. During this time, the  patient had total of 99 apneas, which of those 83 were obstructive and  12 were central.  One was mixed. During the study, the patient had 202  hypopneas. The apnea/hypopnea index was 30. OXYGEN SATURATIONS:  The patient spent 177 minutes with oxygen  saturations below 88 and the lowest oxygen saturation being 73%. HEART RATE SUMMARY:  The patient's average heart rate was 59 beats per  minute. IMPRESSION:  1. Severe sleep apnea. 2.  Significant nocturnal oxygen desaturations. RECOMMENDATIONS:  Given the patient's history, would recommend an in-lab  CPAP titration.         Nellie Pyle MD      D: 10/21/2021 8:54:11       T: 10/21/2021 8:56:40     [id number]  Job#: [id number]     Doc#: [id number]

## 2021-10-22 NOTE — PROGRESS NOTES
Patient sitting up in bed eating breakfast in no distress. Denies any chest pain or shortness of breath. Awaiting transfer to [facility] clinic. No more diarrhea. Vital signs are stable  Chest is clear  Heart irregular rate controlled  Legs reveal no edema  Labs are stable  Assessment: Coronary artery disease  A. Fib  C.  Difficile resolved  Diabetes well controlled  Plan: Transfer to [facility]

## 2021-10-22 NOTE — PROGRESS NOTES
PROGRESS NOTE       PATIENT PROBLEM LIST:  Active Problems:    Diabetes mellitus type 2, insulin dependent ([facility] [address]    Hyperlipidemia    Acute kidney injury ([facility] [address]    Hyponatremia    Metabolic acidosis    Acute respiratory distress    Multiple vessel coronary artery disease    Clostridioides difficile diarrhea    history of  pancreatitis due to biliary obstruction  Resolved Problems:    * No resolved hospital problems. *      SUBJECTIVE:  Medhat Aguirre states he feels warm as his room is excessively warm. He denies any chest discomfort presently nor discomfort arising from his cardiac catheterization site. He denies palpitations but remains in atrial fibrillation with a slow-moderate response. REVIEW OF SYSTEMS:  General ROS: negative for - fatigue, malaise,  weight gain or weight loss  Psychological ROS: positive for - anxiety and depression  Ophthalmic ROS: negative for - decreased vision or visual distortion. ENT ROS: negative  Allergy and Immunology ROS: negative  Hematological and Lymphatic ROS: negative  Endocrine: no heat or cold intolerance and no polyphagia, polydipsia, or polyuria  Respiratory ROS: positive for - cough and shortness of breath  Cardiovascular ROS: positive for - dyspnea on exertion, irregular heartbeat and shortness of breath. Gastrointestinal ROS: no abdominal pain, change in bowel habits, or black or bloody stools  Genito-Urinary ROS: no nocturia, dysuria, trouble voiding, frequency or hematuria  Musculoskeletal ROS: negative for- myalgias, arthralgias, or claudication  Neurological ROS: no TIA or stroke symptoms otherwise no significant change in symptoms or problems since yesterday as documented in previous progress notes.     SCHEDULED MEDICATIONS:   insulin lispro  0-6 Units SubCUTAneous TID WC    insulin lispro  0-3 Units SubCUTAneous Nightly    sotalol  40 mg Oral Q12H    apixaban  5 mg Oral BID    guaiFENesin  400 mg Oral TID    sodium chloride flush  5-40 mL IntraVENous 2 times per day    pantoprazole  40 mg Oral QAM AC    amLODIPine  5 mg Oral Daily    levalbuterol  0.63 mg Nebulization TID    Fidaxomicin  200 mg Oral BID    lactobacillus  1 capsule Oral Daily    influenza virus vaccine  0.5 mL IntraMUSCular Prior to discharge    rosuvastatin  10 mg Oral QPM       VITAL SIGNS:                                                                                                                          BP (!) 122/90   Pulse 65   Temp 98.4 °F (36.9 °C) (Temporal)   Resp 19   Ht 5' 7\" (1.702 m)   Wt 280 lb (127 kg)   SpO2 97%   BMI 43.85 kg/m²   No data found. OBJECTIVE:    HEENT: PERRL, EOM  Intact; sclera non-icteric, conjunctiva pink. Carotids are brisk in upstroke with normal contour. No carotid bruits. Normal jugular venous pulsation at 45°. No palpable cervical nor supraclavicular nodes. Thyroid not palpable. Trachea midline. Chest: Even excursion  Lungs: CTA B, no expiratory wheezes or rhonchi, no decreased tactile fremitus without inspiratory rales. Heart: Regular  rhythm; S1 > S2, no gallop or murmur. No clicks, rub, palpable thrills   or heaves. PMI nondisplaced, 5th intercostal space MCL. Abdomen: Soft, nontender, nondistended, + + grossly protuberant, no masses or organomegaly. Bowel sounds active.right femoral catheterization site without hematoma  Extremities: Without clubbing, cyanosis or edema. Pulses present 3+ upper extermities bilaterally; present 1+ DP and present 1+ PT bilaterally.      Data:   Scheduled Meds: Reviewed  Continuous Infusions:    sodium chloride      dextrose         Intake/Output Summary (Last 24 hours) at 10/22/2021 0156  Last data filed at 10/21/2021 1200  Gross per 24 hour   Intake 240 ml   Output --   Net 240 ml     CBC:   Recent Labs     10/19/21  1559 10/21/21  0720   WBC 8.6 7.7   HGB 11.5* 11.2*   HCT 37.1 36.3*    297     BMP:  Recent Labs     10/19/21  0645 10/19/21  0645 10/20/21  0606 10/20/21  0606 10/21/21  0720   --  135  --  138   K 4.4  --  4.0  --  4.2     --  102  --  103   CO2 22  --  23  --  22   BUN 17  --  18  --  17   CREATININE 1.3*  --  1.4*  --  1.3*   LABGLOM 53   < > 49   < > 53    < > = values in this interval not displayed. ABGs:   Lab Results   Component Value Date    PH 7.410 10/13/2021    PO2 75.6 10/13/2021    PCO2 29.6 10/13/2021     INR: No results for input(s): INR in the last 72 hours. PRO-BNP:   Lab Results   Component Value Date    PROBNP 10,291 (H) 10/16/2021    PROBNP 11,016 (H) 10/13/2021      TSH:   Lab Results   Component Value Date    TSH 3.320 10/18/2021      Cardiac Injury Profile:   Lab Results   Component Value Date    TROPHS 165 (H) 10/12/2021      Lipid Profile:   Lab Results   Component Value Date    TRIG 281 10/13/2021    HDL 11 10/13/2021    LDLCALC 30 10/13/2021    CHOL 97 10/13/2021      Hemoglobin A1C: No components found for: HGBA1C      RAD:   Echo Complete    Result Date: 10/15/2021  Transthoracic Echocardiography Report (TTE)  Demographics   Patient Name    Lavaughn Crigler Gender            Male                  A   Medical Record  [id number]     Room Number       8506  Number   Account #       [de-identified]    Procedure Date    10/14/2021   Corporate ID                 Ordering          Rosa Hansen MD                               Physician   Accession       [id number]   Referring  Number                       Physician   Date of Birth   1940   [address] [facility]   Age             [de-identified] year(s)   Interpreting      [address]                               Physician         Physician Cardiology                                                 Levar Gr MD                                Any Other  Procedure Type of Study   TTE procedure:Echo Complete W/Doppler & Color Flow. Procedure Date Date: 10/14/2021 Start: 03:36 PM Study Location: Portable Technical Quality: Adequate visualization Indications:Congestive heart failure.  Patient Status: Routine Contrast Medium: Definity. Height: 67 inches Weight: 280 pounds BSA: 2.33 m^2 BMI: 43.85 kg/m^2 BP: 152/79 mmHg  Findings   Left Ventricle  Moderate asymmetric septal hypertrophy septal wall 1.6cm. Mild global LV hypokineses  Ejection fraction is visually estimated at 40 to 45%. Right Ventricle  Normal right ventricular size and function. Left Atrium  The left atrium is mildly dilated. Right Atrium  Mildly enlarged right atrium size. Mitral Valve  Mild mitral regurgitation is present. Tricuspid Valve  Mild tricuspid regurgitation. Aortic Valve  The aortic valve appears mildly sclerotic. No hemodynamically significant aortic stenosis is present. Pulmonic Valve  The pulmonic valve was not well visualized. Pericardial Effusion  Fat pad present. Conclusions   Summary  Moderate asymmetric septal hypertrophy septal wall 1.6cm. Mild global LV hypokineses  Ejection fraction is visually estimated at 40 to 45%. Normal right ventricular size and function. Mildly enlarged right atrium size. Mild mitral regurgitation is present. The aortic valve appears mildly sclerotic. No hemodynamically significant aortic stenosis is present. Mild tricuspid regurgitation.    Signature   ----------------------------------------------------------------  Electronically signed by Jona White MD(Interpreting  physician) on 10/15/2021 12:36 PM  ----------------------------------------------------------------  M-Mode/2D Measurements & Calculations   LV Diastolic    LV Systolic Dimension: 3.9   AV Cusp Separation: 1.3 cmLA  Dimension: 4.9  cm                           Dimension: 4 cmAO Root  cm              LV Volume Diastolic: 289.8   Dimension: 3.6 cm  LV FS:20.4 %    ml  LV PW           LV Volume Systolic: 95.7 ml  Diastolic: 1.3  LV EDV/LV EDV Index: 113.9  cm              ml/49 ml/m^2LV ESV/LV ESV    RV Diastolic Dimension: 2.5  Septum          Index: 67.1 ml/29ml/ m^2     cm  Diastolic: 1.6  EF Calculated: 41.1 %  cm              LV Mass Index: 128 l/min*m^2 Ascending Aorta: 2.7 cm                  LV Length: 8.9 cm            LA volume/Index: 87.8 ml  LV Mass: 297.54                              /37.53ml/m^2  g               LVOT: 2.1 cm                 RA Area: 18.7 cm^2  Doppler Measurements & Calculations   MV Peak E-Wave: 1.12 AV Peak Velocity:     LVOT Peak Velocity: 0.94 m/s  m/s                  1.92 m/s              LVOT Mean Velocity: 0.71 m/s  MV Peak A-Wave: 0.29 AV Peak Gradient:     LVOT Peak Gradient: 3.5  m/s                  14.71 mmHg            mmHgLVOT Mean Gradient: 2.2  MV E/A Ratio: 3.89   AV Mean Velocity:     mmHg  MV Peak Gradient:    1.52 m/s              Estimated RVSP: 15.3 mmHg  4.8 mmHg             AV Mean Gradient: 9.8 Estimated RAP:3 mmHg  MV Mean Gradient:    mmHg  1.6 mmHg             AV VTI: 34.2 cm  MV Mean Velocity:    AV Area               TR Velocity:1.76 m/s  0.57 m/s             (Continuity):1.63     TR Gradient:12.35 mmHg  MV Deceleration      cm^2                  PV Peak Velocity: 1.31 m/s  Time: 178.2 msec                           PV Peak Gradient: 6.83 mmHg  MV P1/2t: 50.8 msec  LVOT VTI: 16.1 cm     PV Mean Velocity: 0.8 m/s  MVA by PHT:4.33 cm^2                       PV Mean Gradient: 2.9 mmHg  MV Area              Estimated PASP: 15.35  (continuity): 2.4    mmHg  cm^2  MV E' Septal  Velocity: 0.05 m/s  MV E' Lateral  Velocity: 10 m/s  [email or web address] [id number]    XR CHEST (2 VW)    Result Date: 10/20/2021  EXAMINATION: TWO XRAY VIEWS OF THE CHEST 10/20/2021 10:28 am COMPARISON: 10/13/2021 HISTORY: ORDERING SYSTEM PROVIDED HISTORY: cough TECHNOLOGIST PROVIDED HISTORY: Reason for exam:->cough What reading provider will be dictating this exam?->CRC FINDINGS: There is a right lung base infiltrate. Right upper lobe and left lung clear.  The cardiac silhouette is within normal limits. There is no pleural effusion. 1. Right lower lobe infiltrate     XR CHEST (2 VW)    Result Date: 10/11/2021  EXAMINATION: TWO XRAY VIEWS OF THE CHEST 10/11/2021 11:41 am COMPARISON: 09/18/2014 HISTORY: ORDERING SYSTEM PROVIDED HISTORY: cp TECHNOLOGIST PROVIDED HISTORY: Reason for exam:->cp What reading provider will be dictating this exam?->CRC FINDINGS: Heart size is normal.  There are no infiltrates or effusions. Normal chest     XR ABDOMEN (KUB) (SINGLE AP VIEW)    Result Date: 10/13/2021  EXAMINATION: ONE SUPINE XRAY VIEW(S) OF THE ABDOMEN 10/13/2021 8:41 pm COMPARISON: None. HISTORY: ORDERING SYSTEM PROVIDED HISTORY: ileus TECHNOLOGIST PROVIDED HISTORY: Reason for exam:->ileus What reading provider will be dictating this exam?->CRC FINDINGS: The bowel gas pattern is nonobstructive. Status post cholecystectomy and posterior body fusion at L5-S1. Nonobstructive bowel gas pattern. No evidence of ileus. NM LUNG VENT/PERFUSION (VQ)    Result Date: 10/12/2021  EXAMINATION: NUCLEAR MEDICINE VENTILATION PERFUSION SCAN. 10/12/2021 TECHNIQUE: 63 millicuries aerosolized Tc99m DTPA was administered via mask prior to planar imaging of the lungs in multiple projections. Then, 7.5 millicuries of Tc 53W MAA was administered intravenously prior to planar imaging of the lungs in similar projections. COMPARISON: Chest radiograph 10/11/2021. HISTORY: ORDERING SYSTEM PROVIDED HISTORY: elevated d dimer exclude PE TECHNOLOGIST PROVIDED HISTORY: Reason for exam:->elevated d dimer exclude PE What reading provider will be dictating this exam?->CRC FINDINGS: PERFUSION: Mildly heterogeneous distribution of radiotracer. No unmatched segmental or subsegmental perfusion defects. VENTILATION: Heterogeneous distribution of radiotracer. There is some central airway deposition as can be seen with obstructive airways disease.  CHEST RADIOGRAPH: No focal areas of consolidation or significant effusions on recent chest radiograph. Very low probability for pulmonary embolism. XR CHEST PORTABLE    Result Date: 10/13/2021  EXAMINATION: ONE XRAY VIEW OF THE CHEST 10/13/2021 2:23 pm COMPARISON: October 11, 2021 HISTORY: ORDERING SYSTEM PROVIDED HISTORY: wheezing TECHNOLOGIST PROVIDED HISTORY: Reason for exam:->wheezing What reading provider will be dictating this exam?->CRC FINDINGS: On today's study the heart silhouette appears to be larger than previous. There also appears to be mild increase in pulmonary vascularity which could suggest mild vascular congestion. Trachea is midline. No effusions are seen. No pneumothorax is identified. Visualized bony thorax shows no acute abnormality. Cardiac silhouette appears somewhat larger although this is a portable image and there also appears to be slight increase in pulmonary vascularity. No other acute findings identified. US RETROPERITONEAL COMPLETE    Result Date: 10/12/2021  EXAMINATION: RETROPERITONEAL ULTRASOUND OF THE KIDNEYS AND URINARY BLADDER 10/12/2021 COMPARISON: None HISTORY: ORDERING SYSTEM PROVIDED HISTORY: acute kidney injury TECHNOLOGIST PROVIDED HISTORY: Reason for exam:->acute kidney injury What reading provider will be dictating this exam?->CRC FINDINGS: Kidneys: The right kidney measures 10.6 x 6.5 x 5.4 cm and the left kidney measures 12.5 x 4.6 x 6 cm Kidneys demonstrate normal cortical echogenicity. No evidence of hydronephrosis or intrarenal stones. 1.7 cm simple appearing cyst identified in the left and right midpole. No hydronephrosis. No renal calculus identified Simple appearing cysts in bilateral kidneys.      US DUP LOWER EXTREMITIES BILATERAL VENOUS    Result Date: 10/12/2021  EXAMINATION: DUPLEX VENOUS ULTRASOUND OF THE BILATERAL LOWER [id number]/12/2021 1:45 pm TECHNIQUE: Duplex ultrasound using B-mode/gray scaled imaging, Doppler spectral analysis and color flow Doppler was obtained of the deep venous structures of the lower bilateral extremities. COMPARISON: None. HISTORY: ORDERING SYSTEM PROVIDED HISTORY: elevated d dimer assess for DVT TECHNOLOGIST PROVIDED HISTORY: Reason for exam:->elevated d dimer assess for DVT What reading provider will be dictating this exam?->CRC FINDINGS: The visualized veins of the bilateral lower extremities are patent and free of echogenic thrombus. The veins demonstrate good compressibility with normal color flow study and spectral analysis. No evidence of DVT in either lower extremity. Cardiac Catheterization: 10/18/2021  SUMMARY OF INJECTIONS:  II.  Selective coronary arteriograms. a. Right coronary artery nondominant, a congenitally small vessel with  multiple areas of 85% stenosis seen proximally in the mid third segment  before the takeoff of the right ventricular branches. The distal vessel  was of smaller luminal caliber, but patent. b.  Left coronary - preponderant. 1.  Left main trunk. 10%-15% distal tapering stenosis. 2.  Left anterior descending what appears to be a high-grade eccentric  stenosis in the origin best visualized in the caudal cranial CONCEPCION  Projection including evidence for moderate calcification followed by a 75%-80% stenosis at the level of the first  septal . Immediately prior to this, there is a takeoff of the  diagonal branch, which has 75%-80% stenosis followed by a second area in  the mid segment of 75%-80% stenosis. The distal vessel was  small-to-moderate, luminal caliber. The distal anterior descending  artery is of moderate luminal caliber and widely patent.     III. Circumflex. a. Dominant vessel giving rise to the posterior descending and  posterior ventricular branches. In the first marginal branch, there was  subtotal occlusion with the distal vessel appearing to have a stenosis  at the junction of the proximal mid third segment. The distal vessel  was grossly under profuse.   In the distal circumflex, there was a  75%-80% stenosis leading into the bifurcation takeoff of the posterior  ventricular and posterior descending branches. In the posterior  ventricular branch, there appears to be moderate stenosis as well. III. Injection of left subclavian artery    The left subclavian artery is a large luminal caliber giving rise to a large left internal mammary artery. IV. Left ventricular angiogram.  Left ventricular cavity size is upper  limits of normal with global normal left ventricular systolic function. Estimated left ventricular ejection fraction 55%.     CONCLUSIONS:  1. Severely stenotic native coronary atherosclerosis with coronary  artery calcification. 2.  Well-preserved left ventricular systolic function with abnormal  diastolic dysfunction. NB difficulty was encountered in cannulating and  maintaining the left coronary catheter in the os of the left main  coronary artery in addition to the patient's body habitus. 3.  Widely patent large lumen left subclavian and left internal mammary arteries    EKG: See Report  Echo: See Report      IMPRESSIONS:  Active Problems:    Diabetes mellitus type 2, insulin dependent (HCC)    Hyperlipidemia    Acute kidney injury ([address]    Hyponatremia    Metabolic acidosis    Acute respiratory distress    Multiple vessel coronary artery disease    Clostridioides difficile diarrhea    history of  pancreatitis due to biliary obstruction  Resolved Problems:    * No resolved hospital problems. *      RECOMMENDATIONS:  Awaiting transfer to [facility] at the request of Mr. Duran Larger family for a second opinion regarding approach to severe triple-vessel disease in the presence of underlying diabetes mellitus further complicated by morbid obesity. Arrangement in progress. .  I have spent more than 25 minutes face to face with Julieth Corrigan and reviewing notes and laboratory data, with greater than 50% of this time instructing and counseling the patient and his family members face to face regarding my findings and recommendations and I have answered all questions as posed to me by Mr. Mattie Quinonez as well as his daughter, son and son-in-law. Nidia Topete,  FACP,FACC,[facility]      NOTE:  This report was transcribed using voice recognition software.   Every effort was made to ensure accuracy; however, inadvertent computerized transcription errors may be present

## 2021-10-22 NOTE — PROGRESS NOTES
ENDOCRINOLOGY PROGRESS NOTE      Date of admission: 10/11/2021  Date of service: 10/22/2021  Admitting physician: Michael Dawkins MD   Primary Care Physician: Michael Dawkins MD  Consultant physician: Hue Rogers MD     Reason for the consultation:  Uncontrolled DM    History of Present Illness: The history is provided by the patient. Accuracy of the patient data is excellent    Jada Nye is a very pleasant [de-identified] y.o. old male with PMH DM type 2, pancreatitis , HLD, and other listed below admitted to [facility] on 10/11/2021 because of generalized weakness and found to have SAURABH and hyponatremia, endocrine service was consulted for diabetes management. Subjective:   Patient was seen and examined at bedside this AM. Patient is awaek, alert and oriented x3, he is having breakfast. Patient denies acute complains today. Blood glucose is controlled, range from 124 - 169 on sliding scale. Creatinine stable at 1.3.      Inpatient diet:   Carb Restricted diet     Point of care glucose monitoring   (Independently reviewed)   Recent Labs     10/20/21  1736 10/20/21  1937 10/21/21  0643 10/21/21  1229 10/21/21  1740 10/21/21  1941/21  2040 10/22/21  0601   GLUMET 128* 147* 125* 150* 110* 178* 141* 124*     Scheduled Meds:   insulin lispro  0-6 Units SubCUTAneous TID WC    insulin lispro  0-3 Units SubCUTAneous Nightly    sotalol  40 mg Oral Q12H    apixaban  5 mg Oral BID    guaiFENesin  400 mg Oral TID    sodium chloride flush  5-40 mL IntraVENous 2 times per day    pantoprazole  40 mg Oral QAM AC    amLODIPine  5 mg Oral Daily    levalbuterol  0.63 mg Nebulization TID    Fidaxomicin  200 mg Oral BID    lactobacillus  1 capsule Oral Daily    influenza virus vaccine  0.5 mL IntraMUSCular Prior to discharge    rosuvastatin  10 mg Oral QPM       PRN Meds:   benzonatate, 100 mg, TID PRN  sodium chloride flush, 5-40 mL, PRN  sodium chloride, 25 mL, PRN  guaiFENesin-dextromethorphan, 5 mL, Q4H PRN  regadenoson, 0.4 mg, ONCE PRN  temazepam, 15 mg, Nightly PRN  racepinephrine HCl, 11.25 mg, Q4H PRN  sodium chloride nebulizer, 3 mL, Q4H PRN  glucose, 15 g, PRN  dextrose, 12.5 g, PRN  glucagon (rDNA), 1 mg, PRN  dextrose, 100 mL/hr, PRN  perflutren lipid microspheres, 1.5 mL, ONCE PRN  ondansetron, 4 mg, Q8H PRN  polyethylene glycol, 17 g, Daily PRN  acetaminophen, 650 mg, Q6H PRN   Or  acetaminophen, 650 mg, Q6H PRN      Continuous Infusions:   sodium chloride      dextrose         Review of Systems  All systems reviewed. All negative except for symptoms mentioned in HPI     OBJECTIVE    /76   Pulse 58   Temp 97.4 °F (36.3 °C) (Temporal)   Resp 17   Ht 5' 7\" (1.702 m)   Wt 280 lb (127 kg)   SpO2 96%   BMI 43.85 kg/m²     Intake/Output Summary (Last 24 hours) at 10/22/2021 0932  Last data filed at 10/21/2021 1200  Gross per 24 hour   Intake 120 ml   Output --   Net 120 ml     Physical examination:  Due to this being a TeleHealth encounter, evaluation of the following organ systems is limited: Vitals/Constitutional/EENT/Resp/CV/GI//MS/Neuro/Skin/Heme-Lymph-Imm. Modified physical exam through Telemedicine camera    General: Communicating well via camera   Neck: no obvious neck mass. No obvious neck deformity     CVS: no distress   Chest: no distress. Chest is moving with respiration    Extremities:  no visible tremor  Skin: No visible rashes as seen from camera   Musculoskeletal: no visible deformity  Neuro: Alert and oriented to person, place, and time. Psychiatric: Normal mood and affect.  Behavior is normal      Review of Laboratory Data:  I personally reviewed the following labs:   Recent Labs     10/19/21  1559 10/21/21  0720   WBC 8.6 7.7   RBC 4.35 4.27   HGB 11.5* 11.2*   HCT 37.1 36.3*   MCV 85.3 85.0   MCH 26.4 26.2   MCHC 31.0* 30.9*   RDW 14.6 14.6    297   MPV 11.2 10.8     Recent Labs     10/20/21  0606 10/21/21  0720 10/22/21  0532    138 139   K 4.0 4.2 4.0    103 104   CO2 23 22 21*   BUN 18 17 18   CREATININE 1.4* 1.3* 1.3*   GLUCOSE 116* 112* 115*   CALCIUM 9.8 9.6 9.7     No results found for: BHYDRXBUT  Lab Results   Component Value Date    LABA1C 6.4 10/12/2021    LABA1C 7.7 09/24/2014     Lab Results   Component Value Date/Time    TSH 3.320 10/18/2021 01:43 PM     Lab Results   Component Value Date    LABA1C 6.4 10/12/2021    GLUCOSE 115 10/22/2021    MALBCR 28.4 09/24/2014    LABMICR 27.8 09/24/2014    LABCREA 249 10/13/2021     Lab Results   Component Value Date    TRIG 281 10/13/2021    HDL 11 10/13/2021    LDLCALC 30 10/13/2021    CHOL 97 10/13/2021       Blood culture   Lab Results   Component Value Date    BC 5 Days- no growth 09/25/2014       Radiology:  XR CHEST (2 VW)   Final Result   1. Right lower lobe infiltrate         XR ABDOMEN (KUB) (SINGLE AP VIEW)   Final Result   Nonobstructive bowel gas pattern. No evidence of ileus. XR CHEST PORTABLE   Final Result   Cardiac silhouette appears somewhat larger although this is a portable image   and there also appears to be slight increase in pulmonary vascularity. No   other acute findings identified. US DUP LOWER EXTREMITIES BILATERAL VENOUS   Final Result   No evidence of DVT in either lower extremity. NM LUNG VENT/PERFUSION (VQ)   Final Result   Very low probability for pulmonary embolism. US RETROPERITONEAL COMPLETE   Final Result   No hydronephrosis. No renal calculus identified      Simple appearing cysts in bilateral kidneys.          XR CHEST (2 VW)   Final Result   Normal chest             Medical Records/Labs/Images review:   I personally reviewed and summarized previous records   All labs and imaging were reviewed independently     [address] [place] [address], a [de-identified] y.o.-old male seen today for inpatient diabetes management     Diabetes Mellitus type 2   · Currently require much less insulin due to SAURABH   · We recommend the following diabetes regimen   · Low dose sliding scale with meals and at night   · Continue glucose check with meals and at bedtime   · Will titrate insulin dose based on the blood glucose trend & insulin requirement  · Recommend discharge patient with only medium dose sliding scale. · Pt will follow with his endocrinology after discharge     SAURABH    Management per primary and nephrology    Patient is at risk for hypoglycemia while receiving insulin due to SAURABH    Continue to monitor blood glucose closely    Hyponatremia   · Improved     Evaluation for hypogonadism   · This will be better than as an outpatient   · Pt will follow with us few weeks after discharge. Endocrine follow up visit, Thursday 11/11 at 12:45PM      Electronically signed by Danuta Rutherford MD on 10/22/2021 at 1:45 PM    The above issues were reviewed with the patient who understood and agreed with the plan. Thank you for allowing us to participate in the care of this patient. Please do not hesitate to contact us with any additional questions. Aylin Mclain MD  Endocrinologist, [facility]   [address] [place] [address]   Phone: [phone]  Fax: [phone]  --------------------------------------------  An electronic signature was used to authenticate this note.  Cisco Paez MD on 10/22/2021 at 1:46 PM

## 2021-10-22 NOTE — PROGRESS NOTES
Janny Zarate M.D. Puja Castellanos M.D. Blake Furl, M.D. Elihu Makos, D.O. Daily Pulmonary Progress Note    Patient:  Julieth Corrigan [de-identified] y.o. male MRN: [id number]     Date of Service: 10/22/2021        Subjective      Patient was seen and examined. Overall feels better. Cough resolved. Had difficulty with CPAP overnight and took off. Objective   Vitals: /65   Pulse 58   Temp 97 °F (36.1 °C) (Temporal)   Resp 20   Ht 5' 7\" (1.702 m)   Wt 280 lb (127 kg)   SpO2 92%   BMI 43.85 kg/m²     I/O:  No intake or output data in the 24 hours ending 10/22/21 1750    CURRENT MEDS :  Scheduled Meds:   insulin lispro  0-6 Units SubCUTAneous TID WC    insulin lispro  0-3 Units SubCUTAneous Nightly    apixaban  5 mg Oral BID    guaiFENesin  400 mg Oral TID    sodium chloride flush  5-40 mL IntraVENous 2 times per day    pantoprazole  40 mg Oral QAM AC    amLODIPine  5 mg Oral Daily    levalbuterol  0.63 mg Nebulization TID    Fidaxomicin  200 mg Oral BID    lactobacillus  1 capsule Oral Daily    influenza virus vaccine  0.5 mL IntraMUSCular Prior to discharge    rosuvastatin  10 mg Oral QPM       Continuous Infusions:   sodium chloride      dextrose         PRN Meds:  benzonatate, sodium chloride flush, sodium chloride, guaiFENesin-dextromethorphan, regadenoson, temazepam, racepinephrine HCl, sodium chloride nebulizer, glucose, dextrose, glucagon (rDNA), dextrose, perflutren lipid microspheres, ondansetron **OR** [DISCONTINUED] ondansetron, polyethylene glycol, acetaminophen **OR** acetaminophen      Physical Exam:  Physical Exam  Constitutional:       General: He is not in acute distress. Appearance: He is obese. HENT:      Head: Normocephalic and atraumatic. Mouth/Throat:      Pharynx: No oropharyngeal exudate. Eyes:      General: No scleral icterus.      Conjunctiva/sclera: Conjunctivae normal.   Neck:      Trachea: No tracheal deviation. Cardiovascular:      Rate and Rhythm: Normal rate. Rhythm irregular. Heart sounds: Normal heart sounds. Pulmonary:      Effort: Pulmonary effort is normal.      Breath sounds: Normal breath sounds. Abdominal:      Palpations: Abdomen is soft. Tenderness: There is no abdominal tenderness. Musculoskeletal:         General: No swelling or deformity. Cervical back: Neck supple. Right lower leg: Edema present. Left lower leg: Edema present. Lymphadenopathy:      Cervical: No cervical adenopathy. Skin:     General: Skin is warm. Findings: No rash. Neurological:      General: No focal deficit present. Mental Status: He is alert and oriented to person, place, and time. Psychiatric:         Behavior: Behavior normal.         Pertinent/ New Labs and Imaging Studies     Pulmonary Function Testing personally reviewed and interpreted. PERTINENT LAB RESULTS: Labs reviewed. DIAGNOSTICS: Pertinent imaging reviewed. Assessment:      1. Generalized weakness and fatigue - improved  2. Severe OMARI AHI 30  3. C. difficile infection -improved  4. SAURABH on CKD -improved  5. Cardiomyopathy with EF 40% -EF improved on catheterization  6. A. fib  7. MV CAD  8. Cough -improved, respiratory culture with NOF  9. Sleep-related breathing disorder suspect OMARI  10. Morbid obesity, BMI 43.85      Plan:      Mucolytic's, flutter valve   Doubt pna, hold on further abx   Antibiotic with Dificid for C. Difficile 10-14-day course   CPAP qhs, trial of different mask   Patient ordered Auto cpap 5-15. Unable to do pap titration at this time due to cardiac illness. Benefits of starting some therapy with Auto PAP outweigh risk of being untreated. If fails then can proceed to auto-bipap due to insurance requirements. Once recovered titration can be done at a later time.    PPI/Eliquis    Will likely transfer to [facility] for advanced cardiac eval and intervention given significant and severe MV CAD    Thank you for allowing me to participate in the care of [facility]. Please feel free to call with questions.      Electronically signed by Cher House DO on 10/22/2021 at 5:50 PM

## 2021-10-22 NOTE — PROGRESS NOTES
Progress Note  10/22/2021 2:27 PM  Subjective:   Admit Date: 10/11/2021  PCP: Aisha Corcoran MD  Interval History: patient examined doing well feels ok     Diet: ADULT DIET; Regular    Data:   Scheduled Meds:   insulin lispro  0-6 Units SubCUTAneous TID WC    insulin lispro  0-3 Units SubCUTAneous Nightly    apixaban  5 mg Oral BID    guaiFENesin  400 mg Oral TID    sodium chloride flush  5-40 mL IntraVENous 2 times per day    pantoprazole  40 mg Oral QAM AC    amLODIPine  5 mg Oral Daily    levalbuterol  0.63 mg Nebulization TID    Fidaxomicin  200 mg Oral BID    lactobacillus  1 capsule Oral Daily    influenza virus vaccine  0.5 mL IntraMUSCular Prior to discharge    rosuvastatin  10 mg Oral QPM     Continuous Infusions:   sodium chloride      dextrose       PRN Meds:benzonatate, sodium chloride flush, sodium chloride, guaiFENesin-dextromethorphan, regadenoson, temazepam, racepinephrine HCl, sodium chloride nebulizer, glucose, dextrose, glucagon (rDNA), dextrose, perflutren lipid microspheres, ondansetron **OR** [DISCONTINUED] ondansetron, polyethylene glycol, acetaminophen **OR** acetaminophen  I/O last 3 completed shifts: In: 240 [P.O.:240]  Out: -   No intake/output data recorded. No intake or output data in the 24 hours ending 10/22/21 1427  CBC:   Recent Labs     10/19/21  1559 10/21/21  0720   WBC 8.6 7.7   HGB 11.5* 11.2*    297     BMP:    Recent Labs     10/20/21  0606 10/21/21  0720 10/22/21  0532    138 139   K 4.0 4.2 4.0    103 104   CO2 23 22 21*   BUN 18 17 18   CREATININE 1.4* 1.3* 1.3*   GLUCOSE 116* 112* 115*     Hepatic: No results for input(s): AST, ALT, ALB, BILITOT, ALKPHOS in the last 72 hours. Troponin: No results for input(s): TROPONINI in the last 72 hours. BNP: No results for input(s): BNP in the last 72 hours. Lipids: No results for input(s): CHOL, HDL in the last 72 hours.     Invalid input(s): LDLCALCU  ABGs: No results found for: PHART, PO2ART, The right kidney measures 10.6 x 6.5 x 5.4 cm and the left kidney measures 12.5 x 4.6 x 6 cm Kidneys demonstrate normal cortical echogenicity. No evidence of hydronephrosis or intrarenal stones. 1.7 cm simple appearing cyst identified in the left and right midpole. No hydronephrosis. No renal calculus identified Simple appearing cysts in bilateral kidneys. US DUP LOWER EXTREMITIES BILATERAL VENOUS    Result Date: 10/12/2021  EXAMINATION: DUPLEX VENOUS ULTRASOUND OF THE BILATERAL LOWER [id number]/12/2021 1:45 pm TECHNIQUE: Duplex ultrasound using B-mode/gray scaled imaging, Doppler spectral analysis and color flow Doppler was obtained of the deep venous structures of the lower bilateral extremities. COMPARISON: None. HISTORY: ORDERING SYSTEM PROVIDED HISTORY: elevated d dimer assess for DVT TECHNOLOGIST PROVIDED HISTORY: Reason for exam:->elevated d dimer assess for DVT What reading provider will be dictating this exam?->CRC FINDINGS: The visualized veins of the bilateral lower extremities are patent and free of echogenic thrombus. The veins demonstrate good compressibility with normal color flow study and spectral analysis. No evidence of DVT in either lower extremity. Objective:   Vitals: /76   Pulse 58   Temp 97.4 °F (36.3 °C) (Temporal)   Resp 17   Ht 5' 7\" (1.702 m)   Wt 280 lb (127 kg)   SpO2 96%   BMI 43.85 kg/m²   General appearance: appears stated age   Skin:  No rashes or lesions  HEENT: Head: Normocephalic, no lesions, without obvious abnormality.   Neck: no adenopathy, no carotid bruit, no JVD, supple, symmetrical, trachea midline and thyroid not enlarged, symmetric, no tenderness/mass/nodules  Lungs: clear to auscultation bilaterally  Heart: regular rate and rhythm, S1, S2 normal, no murmur, click, rub or gallop  Abdomen: soft, non-tender; bowel sounds normal; no masses,  no organomegaly  Extremities: edema +  Neurologic: Mental status: Alert, oriented, thought content appropriate    Assessment:   Patient Active Problem List:     HTN (hypertension)     Diabetes mellitus type 2, insulin dependent ([facility] [address]     Hyperlipidemia     Renal insufficiency     Osteoarthritis, knee     Acute kidney injury ([facility] [address]     Hyponatremia     Metabolic acidosis     Acute respiratory distress     Multiple vessel coronary artery disease     Clostridioides difficile diarrhea     history of  pancreatitis due to biliary obstruction    Plan:   IMPRESSION/RECOMMENDATIONS:          1. Hyponatremia  Na 129-->133-->132-->133-->137-->142  Resolved        2. SAURABH  Baseline from 2014 of 1.5  Cr now 2.9>2.6.->2.0-->1.6-->1.4 -->1.3 under baseline  No hydro on shirley  Check daily bmp  Holding cozaar  Sp bactrim   Stop IVF     3. Shortness of breath-  Pulmonology following also  Cardiology following  S/p cardiac cath and is to have CABG     4. Hypertension-  Controlled at goal<130/80  Off  cozaar  On norvasc     5.  Metabolic acidosis  In setting of ckd  Stable  off IVF  Resolved    Trace edema , awaiting tr to [facility] for intervention - CABG/CARDIAC CATH +/- stents        Vickie Blakely MD

## 2021-10-23 LAB
ANION GAP SERPL CALCULATED.3IONS-SCNC: 9 MMOL/L (ref 7–16)
BUN BLDV-MCNC: 16 MG/DL (ref 6–23)
CALCIUM SERPL-MCNC: 9.9 MG/DL (ref 8.6–10.2)
CHLORIDE BLD-SCNC: 105 MMOL/L (ref 98–107)
CO2: 25 MMOL/L (ref 22–29)
CREAT SERPL-MCNC: 1.4 MG/DL (ref 0.7–1.2)
GFR AFRICAN AMERICAN: 59
GFR NON-AFRICAN AMERICAN: 49 ML/MIN/1.73
GLUCOSE BLD-MCNC: 111 MG/DL (ref 74–99)
HCT VFR BLD CALC: 36 % (ref 37–54)
HEMOGLOBIN: 11.2 G/DL (ref 12.5–16.5)
MCH RBC QN AUTO: 27 PG (ref 26–35)
MCHC RBC AUTO-ENTMCNC: 31.1 % (ref 32–34.5)
MCV RBC AUTO: 86.7 FL (ref 80–99.9)
METER GLUCOSE: 102 MG/DL (ref 74–99)
METER GLUCOSE: 130 MG/DL (ref 74–99)
METER GLUCOSE: 136 MG/DL (ref 74–99)
METER GLUCOSE: 142 MG/DL (ref 74–99)
PDW BLD-RTO: 14.5 FL (ref 11.5–15)
PLATELET # BLD: 275 E9/L (ref 130–450)
PMV BLD AUTO: 10.9 FL (ref 7–12)
POTASSIUM SERPL-SCNC: 4.3 MMOL/L (ref 3.5–5)
RBC # BLD: 4.15 E12/L (ref 3.8–5.8)
SODIUM BLD-SCNC: 139 MMOL/L (ref 132–146)
WBC # BLD: 7.1 E9/L (ref 4.5–11.5)

## 2021-10-23 PROCEDURE — 36415 COLL VENOUS BLD VENIPUNCTURE: CPT

## 2021-10-23 PROCEDURE — 6370000000 HC RX 637 (ALT 250 FOR IP): Performed by: INTERNAL MEDICINE

## 2021-10-23 PROCEDURE — 6370000000 HC RX 637 (ALT 250 FOR IP): Performed by: NURSE PRACTITIONER

## 2021-10-23 PROCEDURE — 82962 GLUCOSE BLOOD TEST: CPT

## 2021-10-23 PROCEDURE — 2580000003 HC RX 258: Performed by: INTERNAL MEDICINE

## 2021-10-23 PROCEDURE — 6360000002 HC RX W HCPCS: Performed by: INTERNAL MEDICINE

## 2021-10-23 PROCEDURE — 99231 SBSQ HOSP IP/OBS SF/LOW 25: CPT | Performed by: FAMILY MEDICINE

## 2021-10-23 PROCEDURE — 85027 COMPLETE CBC AUTOMATED: CPT

## 2021-10-23 PROCEDURE — 94660 CPAP INITIATION&MGMT: CPT

## 2021-10-23 PROCEDURE — 2500000003 HC RX 250 WO HCPCS: Performed by: INTERNAL MEDICINE

## 2021-10-23 PROCEDURE — 80048 BASIC METABOLIC PNL TOTAL CA: CPT

## 2021-10-23 PROCEDURE — 94640 AIRWAY INHALATION TREATMENT: CPT

## 2021-10-23 PROCEDURE — 99232 SBSQ HOSP IP/OBS MODERATE 35: CPT | Performed by: INTERNAL MEDICINE

## 2021-10-23 PROCEDURE — 2060000000 HC ICU INTERMEDIATE R&B

## 2021-10-23 PROCEDURE — 6370000000 HC RX 637 (ALT 250 FOR IP): Performed by: FAMILY MEDICINE

## 2021-10-23 RX ADMIN — Medication 1 CAPSULE: at 09:38

## 2021-10-23 RX ADMIN — LEVALBUTEROL HYDROCHLORIDE 0.63 MG: 0.63 SOLUTION RESPIRATORY (INHALATION) at 20:35

## 2021-10-23 RX ADMIN — LEVALBUTEROL HYDROCHLORIDE 0.63 MG: 0.63 SOLUTION RESPIRATORY (INHALATION) at 08:40

## 2021-10-23 RX ADMIN — ROSUVASTATIN 10 MG: 10 TABLET, FILM COATED ORAL at 18:04

## 2021-10-23 RX ADMIN — LEVALBUTEROL HYDROCHLORIDE 0.63 MG: 0.63 SOLUTION RESPIRATORY (INHALATION) at 12:27

## 2021-10-23 RX ADMIN — Medication 10 ML: at 21:15

## 2021-10-23 RX ADMIN — PANTOPRAZOLE SODIUM 40 MG: 40 TABLET, DELAYED RELEASE ORAL at 06:50

## 2021-10-23 RX ADMIN — AMLODIPINE BESYLATE 5 MG: 5 TABLET ORAL at 09:38

## 2021-10-23 RX ADMIN — FIDAXOMICIN 200 MG: 200 TABLET, FILM COATED ORAL at 09:37

## 2021-10-23 RX ADMIN — Medication 10 ML: at 09:38

## 2021-10-23 RX ADMIN — GUAIFENESIN 400 MG: 400 TABLET ORAL at 21:14

## 2021-10-23 RX ADMIN — GUAIFENESIN 400 MG: 400 TABLET ORAL at 09:38

## 2021-10-23 RX ADMIN — APIXABAN 5 MG: 5 TABLET, FILM COATED ORAL at 21:15

## 2021-10-23 RX ADMIN — APIXABAN 5 MG: 5 TABLET, FILM COATED ORAL at 09:38

## 2021-10-23 RX ADMIN — GUAIFENESIN 400 MG: 400 TABLET ORAL at 14:07

## 2021-10-23 RX ADMIN — INSULIN LISPRO 1 UNITS: 100 INJECTION, SOLUTION INTRAVENOUS; SUBCUTANEOUS at 21:15

## 2021-10-23 RX ADMIN — FIDAXOMICIN 200 MG: 200 TABLET, FILM COATED ORAL at 21:14

## 2021-10-23 RX ADMIN — BUMETANIDE 0.5 MG: 0.25 INJECTION, SOLUTION INTRAMUSCULAR; INTRAVENOUS at 06:49

## 2021-10-23 ASSESSMENT — PAIN SCALES - GENERAL
PAINLEVEL_OUTOF10: 0

## 2021-10-23 NOTE — PROGRESS NOTES
Lambert Fischer M.D. Jamie Martinez M.D. Judit Ramsey M.D. Neeta Cardona D.O. Daily Pulmonary Progress Note    Patient:  Caty Kelly [de-identified] y.o. male MRN: [id number]     Date of Service: 10/23/2021        Subjective      Patient was seen and examined. Still having difficulty with wearing CPAP at night. Different mask tried, nasal pillows. Objective   Vitals: BP (!) 126/53   Pulse 60   Temp 96.8 °F (36 °C) (Temporal)   Resp 14   Ht 5' 7\" (1.702 m)   Wt 280 lb (127 kg)   SpO2 99%   BMI 43.85 kg/m²     I/O:  No intake or output data in the 24 hours ending 10/23/21 1805    CURRENT MEDS :  Scheduled Meds:   insulin lispro  0-6 Units SubCUTAneous TID WC    insulin lispro  0-3 Units SubCUTAneous Nightly    apixaban  5 mg Oral BID    guaiFENesin  400 mg Oral TID    sodium chloride flush  5-40 mL IntraVENous 2 times per day    pantoprazole  40 mg Oral QAM AC    amLODIPine  5 mg Oral Daily    levalbuterol  0.63 mg Nebulization TID    Fidaxomicin  200 mg Oral BID    lactobacillus  1 capsule Oral Daily    influenza virus vaccine  0.5 mL IntraMUSCular Prior to discharge    rosuvastatin  10 mg Oral QPM       Continuous Infusions:   sodium chloride      dextrose         PRN Meds:  benzonatate, sodium chloride flush, sodium chloride, guaiFENesin-dextromethorphan, regadenoson, temazepam, racepinephrine HCl, sodium chloride nebulizer, glucose, dextrose, glucagon (rDNA), dextrose, perflutren lipid microspheres, ondansetron **OR** [DISCONTINUED] ondansetron, polyethylene glycol, acetaminophen **OR** acetaminophen      Physical Exam:  Physical Exam  Constitutional:       General: He is not in acute distress. Appearance: He is obese. HENT:      Head: Normocephalic and atraumatic. Mouth/Throat:      Pharynx: No oropharyngeal exudate. Eyes:      General: No scleral icterus.      Conjunctiva/sclera: Conjunctivae normal.   Neck:      Trachea: No tracheal deviation. Cardiovascular:      Rate and Rhythm: Normal rate. Rhythm irregular. Heart sounds: Normal heart sounds. Pulmonary:      Effort: Pulmonary effort is normal.      Breath sounds: Normal breath sounds. Abdominal:      Palpations: Abdomen is soft. Tenderness: There is no abdominal tenderness. Musculoskeletal:         General: No swelling or deformity. Cervical back: Neck supple. Right lower leg: Edema present. Left lower leg: Edema present. Lymphadenopathy:      Cervical: No cervical adenopathy. Skin:     General: Skin is warm. Findings: No rash. Neurological:      General: No focal deficit present. Mental Status: He is alert and oriented to person, place, and time. Psychiatric:         Behavior: Behavior normal.         Pertinent/ New Labs and Imaging Studies     Pulmonary Function Testing personally reviewed and interpreted. PERTINENT LAB RESULTS: Labs reviewed. DIAGNOSTICS: Pertinent imaging reviewed. Assessment:      1. Generalized weakness and fatigue - improved  2. Severe OMARI AHI 30  3. C. difficile infection -improved  4. SAURABH on CKD -improved  5. Cardiomyopathy with EF 40% -EF improved on catheterization  6. A. fib  7. MV CAD  8. Cough -improved, respiratory culture with NOF  9. Sleep-related breathing disorder suspect OMARI  10. Morbid obesity, BMI 43.85      Plan:      Mucolytic's, flutter valve   Doubt pna, hold on further abx   Antibiotic with Dificid for C. Difficile 10-14-day course   CPAP qhs, trial of different mask   Patient ordered Auto cpap 5-15. Unable to do pap titration at this time due to cardiac illness. Benefits of starting some therapy with Auto PAP outweigh risk of being untreated. If fails then can proceed to auto-bipap due to insurance requirements. Once recovered titration can be done at a later time.    PPI/Eliquis    Will likely transfer to [facility] for advanced cardiac eval and intervention given significant and severe MV CAD    Thank you for allowing me to participate in the care of Mariela Blanton. Please feel free to call with questions.      Electronically signed by Ramakrishna Schultz DO on 10/23/2021 at 6:05 PM

## 2021-10-23 NOTE — PROGRESS NOTES
Progress Note  10/23/2021 12:14 PM  Subjective:   Admit Date: 10/11/2021  PCP: Gomez Abreu MD  Interval History: Patient examined doing well feels ok , awaiting tr to [facility]     Diet: ADULT DIET; Regular    Data:   Scheduled Meds:   insulin lispro  0-6 Units SubCUTAneous TID WC    insulin lispro  0-3 Units SubCUTAneous Nightly    apixaban  5 mg Oral BID    guaiFENesin  400 mg Oral TID    sodium chloride flush  5-40 mL IntraVENous 2 times per day    pantoprazole  40 mg Oral QAM AC    amLODIPine  5 mg Oral Daily    levalbuterol  0.63 mg Nebulization TID    Fidaxomicin  200 mg Oral BID    lactobacillus  1 capsule Oral Daily    influenza virus vaccine  0.5 mL IntraMUSCular Prior to discharge    rosuvastatin  10 mg Oral QPM     Continuous Infusions:   sodium chloride      dextrose       PRN Meds:benzonatate, sodium chloride flush, sodium chloride, guaiFENesin-dextromethorphan, regadenoson, temazepam, racepinephrine HCl, sodium chloride nebulizer, glucose, dextrose, glucagon (rDNA), dextrose, perflutren lipid microspheres, ondansetron **OR** [DISCONTINUED] ondansetron, polyethylene glycol, acetaminophen **OR** acetaminophen  No intake/output data recorded. No intake/output data recorded. No intake or output data in the 24 hours ending 10/23/21 1214  CBC:   Recent Labs     10/21/21  0720 10/23/21  0455   WBC 7.7 7.1   HGB 11.2* 11.2*    275     BMP:    Recent Labs     10/21/21  0720 10/22/21  0532 10/23/21  0455    139 139   K 4.2 4.0 4.3    104 105   CO2 22 21* 25   BUN 17 18 16   CREATININE 1.3* 1.3* 1.4*   GLUCOSE 112* 115* 111*     Hepatic: No results for input(s): AST, ALT, ALB, BILITOT, ALKPHOS in the last 72 hours. Troponin: No results for input(s): TROPONINI in the last 72 hours. BNP: No results for input(s): BNP in the last 72 hours. Lipids: No results for input(s): CHOL, HDL in the last 72 hours.     Invalid input(s): LDLCALCU  ABGs: No results found for: PHART, PO2ART, [id number]  INR: No results for input(s): INR in the last 72 hours. -----------------------------------------------------------------  RAD: XR CHEST (2 VW)    Result Date: 10/11/2021  EXAMINATION: TWO XRAY VIEWS OF THE CHEST 10/11/2021 11:41 am COMPARISON: 09/18/2014 HISTORY: ORDERING SYSTEM PROVIDED HISTORY: cp TECHNOLOGIST PROVIDED HISTORY: Reason for exam:->cp What reading provider will be dictating this exam?->CRC FINDINGS: Heart size is normal.  There are no infiltrates or effusions. Normal chest     NM LUNG VENT/PERFUSION (VQ)    Result Date: 10/12/2021  EXAMINATION: NUCLEAR MEDICINE VENTILATION PERFUSION SCAN. 10/12/2021 TECHNIQUE: 63 millicuries aerosolized Tc99m DTPA was administered via mask prior to planar imaging of the lungs in multiple projections. Then, 7.5 millicuries of Tc 78Z MAA was administered intravenously prior to planar imaging of the lungs in similar projections. COMPARISON: Chest radiograph 10/11/2021. HISTORY: ORDERING SYSTEM PROVIDED HISTORY: elevated d dimer exclude PE TECHNOLOGIST PROVIDED HISTORY: Reason for exam:->elevated d dimer exclude PE What reading provider will be dictating this exam?->CRC FINDINGS: PERFUSION: Mildly heterogeneous distribution of radiotracer. No unmatched segmental or subsegmental perfusion defects. VENTILATION: Heterogeneous distribution of radiotracer. There is some central airway deposition as can be seen with obstructive airways disease. CHEST RADIOGRAPH: No focal areas of consolidation or significant effusions on recent chest radiograph. Very low probability for pulmonary embolism.      US RETROPERITONEAL COMPLETE    Result Date: 10/12/2021  EXAMINATION: RETROPERITONEAL ULTRASOUND OF THE KIDNEYS AND URINARY BLADDER 10/12/2021 COMPARISON: None HISTORY: ORDERING SYSTEM PROVIDED HISTORY: acute kidney injury TECHNOLOGIST PROVIDED HISTORY: Reason for exam:->acute kidney injury What reading provider will be dictating this exam?->CRC FINDINGS: Kidneys: The right kidney measures 10.6 x 6.5 x 5.4 cm and the left kidney measures 12.5 x 4.6 x 6 cm Kidneys demonstrate normal cortical echogenicity. No evidence of hydronephrosis or intrarenal stones. 1.7 cm simple appearing cyst identified in the left and right midpole. No hydronephrosis. No renal calculus identified Simple appearing cysts in bilateral kidneys. US DUP LOWER EXTREMITIES BILATERAL VENOUS    Result Date: 10/12/2021  EXAMINATION: DUPLEX VENOUS ULTRASOUND OF THE BILATERAL LOWER MICCNVLRVIK99/12/2021 1:45 pm TECHNIQUE: Duplex ultrasound using B-mode/gray scaled imaging, Doppler spectral analysis and color flow Doppler was obtained of the deep venous structures of the lower bilateral extremities. COMPARISON: None. HISTORY: ORDERING SYSTEM PROVIDED HISTORY: elevated d dimer assess for DVT TECHNOLOGIST PROVIDED HISTORY: Reason for exam:->elevated d dimer assess for DVT What reading provider will be dictating this exam?->CRC FINDINGS: The visualized veins of the bilateral lower extremities are patent and free of echogenic thrombus. The veins demonstrate good compressibility with normal color flow study and spectral analysis. No evidence of DVT in either lower extremity. Objective:   Vitals: /75   Pulse 58   Temp 98.1 °F (36.7 °C) (Oral)   Resp 18   Ht 5' 7\" (1.702 m)   Wt 280 lb (127 kg)   SpO2 97%   BMI 43.85 kg/m²   General appearance: appears stated age   Skin:  No rashes or lesions  HEENT: Head: Normocephalic, no lesions, without obvious abnormality.   Neck: no adenopathy, no carotid bruit, no JVD, supple, symmetrical, trachea midline and thyroid not enlarged, symmetric, no tenderness/mass/nodules  Lungs: clear to auscultation bilaterally  Heart: regular rate and rhythm, S1, S2 normal, no murmur, click, rub or gallop  Abdomen: soft, non-tender; bowel sounds normal; no masses,  no organomegaly  Extremities: edema ++  Neurologic: Mental status: Alert, oriented, thought content appropriate    Assessment:   Patient Active Problem List:     HTN (hypertension)     Diabetes mellitus type 2, insulin dependent ([facility] [address]     Hyperlipidemia     Renal insufficiency     Osteoarthritis, knee     Acute kidney injury ([facility] [address]     Hyponatremia     Metabolic acidosis     Acute respiratory distress     Multiple vessel coronary artery disease     Clostridioides difficile diarrhea     history of  pancreatitis due to biliary obstruction    Plan:   IMPRESSION/RECOMMENDATIONS:          1. Hyponatremia  Na 129-->133-->132-->133-->137-->139  Resolved        2. SAURABH  Baseline from 2014 of 1.5  Cr now 2.9>2.6.->2.0-->1.6-->1.4  under baseline  No hydro on shirley  Check daily bmp  Holding cozaar  Sp bactrim   Stop IVF     3. Shortness of breath-  Pulmonology following also  Cardiology following  S/p cardiac cath and is to have CABG     4. Hypertension-  Controlled at goal<130/80  Off  cozaar  On norvasc     5.  Metabolic acidosis  In setting of ckd  Stable  off IVF  Resolved     Trace edema , awaiting tr to [facility] for intervention - CABG VS CARDIAC CATH +/- stents        Jocelynn Garcia MD

## 2021-10-23 NOTE — PROGRESS NOTES
Admit Date: 10/11/2021       Subjective:  Chief Complaint   Patient presents with    Fatigue     fatigue x1 week    Extremity Weakness     weakness x1week            Objective:      Bun/cr 16/1.4  About the same (admission 31/2.9)  bnp 7283,  10,000    a1c 6.4    cxr  RLL infiltrate 10/20    +[phone] since admission    Pt's main complaint is being cold in fact threatens to leave hospital if something not done about it. Discussed with Nurse manager and this will be addressed.       Scheduled Meds:  Current Facility-Administered Medications   Medication Dose Route Frequency Provider Last Rate Last Admin    insulin lispro (HUMALOG) injection vial 0-6 Units  0-6 Units SubCUTAneous TID WC Odilon Sheehan MD   1 Units at 10/22/21 1219    insulin lispro (HUMALOG) injection vial 0-3 Units  0-3 Units SubCUTAneous Nightly Odilon Sheehan MD   1 Units at 10/21/21 2057    benzonatate (TESSALON) capsule 100 mg  100 mg Oral TID PRN Tacey Rank, DO        apixaban (ELIQUIS) tablet 5 mg  5 mg Oral BID Liyah Sanchezick, DO   5 mg at 10/23/21 0901    guaiFENesin tablet 400 mg  400 mg Oral TID John Motaon, DO   400 mg at 10/23/21 2641    sodium chloride flush 0.9 % injection 5-40 mL  5-40 mL IntraVENous 2 times per day Liyah Hemant, DO   10 mL at 10/23/21 0135    sodium chloride flush 0.9 % injection 5-40 mL  5-40 mL IntraVENous PRN Liyah Sanchezick, DO        0.9 % sodium chloride infusion  25 mL IntraVENous PRN Liyah Sanchezick, DO        pantoprazole (PROTONIX) tablet 40 mg  40 mg Oral QAM AC Arjun Lights, APRN - CNP   40 mg at 10/23/21 0650    amLODIPine (NORVASC) tablet 5 mg  5 mg Oral Daily Mariela Pantelis, APRN - CNP   5 mg at 10/23/21 8662    levalbuterol (XOPENEX) nebulization 0.63 mg  0.63 mg Nebulization TID Greg Gonzalez MD   0.63 mg at 10/23/21 0840    guaiFENesin-dextromethorphan (ROBITUSSIN DM) 100-10 MG/5ML syrup 5 mL  5 mL Oral Q4H PRN Arelia Pump, MD        regadenoson Sukhwinder Joan) injection 0.4 mg  0.4 mg IntraVENous ONCE PRN Mariela Pantelis, APRN - CNP        Fidaxomicin (DIFICID) tablet 200 mg  200 mg Oral BID Christel Mathis MD   200 mg at 10/23/21 5592    temazepam (RESTORIL) capsule 15 mg  15 mg Oral Nightly PRN Essence Valencia MD   15 mg at 10/16/21 6286    lactobacillus (CULTURELLE) capsule 1 capsule  1 capsule Oral Daily Dimitrios Murillo MD   1 capsule at 10/23/21 0938    racepinephrine HCl (VAPONEFPRIN) 2.25 % nebulizer solution NEBU 11.25 mg  11.25 mg Nebulization Q4H PRN Dimitrios Murillo MD        sodium chloride nebulizer 0.9 % solution 3 mL  3 mL Nebulization Q4H PRN Dimitrios Murillo MD        glucose (GLUTOSE) 40 % oral gel 15 g  15 g Oral PRN Essence Valencia MD        dextrose 50 % IV solution  12.5 g IntraVENous PRN Essence Valencia MD        glucagon (rDNA) injection 1 mg  1 mg IntraMUSCular PRN Essence Valencia MD        dextrose 5 % solution  100 mL/hr IntraVENous PRN Essence Valencia MD        perflutren lipid microspheres (DEFINITY) injection 1.65 mg  1.5 mL IntraVENous ONCE PRN Dimitrios Murillo MD        influenza quadrivalent split vaccine (FLUZONE;FLUARIX;FLULAVAL;AFLURIA) injection 0.5 mL  0.5 mL IntraMUSCular Prior to discharge Essence Valencia MD        rosuvastatin (CRESTOR) tablet 10 mg  10 mg Oral QPM Essence Valencia MD   10 mg at 10/22/21 1814    ondansetron (ZOFRAN-ODT) disintegrating tablet 4 mg  4 mg Oral Q8H PRN Essence Valencia MD        polyethylene glycol [facility]) packet 17 g  17 g Oral Daily PRN Essence Valencia MD        acetaminophen (TYLENOL) tablet 650 mg  650 mg Oral Q6H PRN Essence Valencia MD        Or   [facility] acetaminophen (TYLENOL) suppository 650 mg  650 mg Rectal Q6H PRN Essence Valencia MD           Continuous Infusions  :   sodium chloride      dextrose         PRN Meds:  benzonatate, sodium chloride flush, sodium chloride, guaiFENesin-dextromethorphan, regadenoson, temazepam, racepinephrine HCl, sodium chloride nebulizer, glucose, dextrose, glucagon (rDNA), dextrose, perflutren lipid microspheres, ondansetron **OR** [DISCONTINUED] ondansetron, polyethylene glycol, acetaminophen **OR** acetaminophen      Wt Readings from Last 3 Encounters:   10/11/21 280 lb (127 kg)   09/18/14 265 lb (120.2 kg)   08/28/14 265 lb 8 oz (120.4 kg)     No intake/output data recorded. No intake or output data in the 24 hours ending 10/23/21 1219    Vitals:    10/23/21 0840   BP:    Pulse:    Resp:    Temp:    SpO2: 97%       Physical Exam:  Awake and alert   Complaints of being could as abouve h  Heart reg lungs clear      CBC  Recent Labs     10/21/21  0720 10/23/21  0455   WBC 7.7 7.1   HGB 11.2* 11.2*   HCT 36.3* 36.0*   MCV 85.0 86.7    275     BMP  Recent Labs     10/21/21  0720 10/22/21  0532 10/23/21  0455    139 139   K 4.2 4.0 4.3    104 105   CO2 22 21* 25   BUN 17 18 16   CREATININE 1.3* 1.3* 1.4*   LABGLOM 53 53 49   CALCIUM 9.6 9.7 9.9     LFT's  No results for input(s): ALT in the last 72 hours. Invalid input(s):  AST,  ALKPHOS,  BILITOT,  BILIDIR  INR: No results for input(s): INR in the last 72 hours. No results found for: CRP  No results found for: SEDRATE  No results for input(s): POCGLU in the last 72 hours. Lipids: No results for input(s): CHOL, HDL in the last 72 hours.     Invalid input(s): LDLCALCU  ABGs: No results found for: PHART, PO2ART, RCD9UUG  SpO2 Readings from Last 1 Encounters:   10/23/21 97%       Last 3 Troponin:  No results found for: TROPONININo results found for: CKTOTAL, CKMB, CKMBINDEX, TROPONINI     TSH:    Lab Results   Component Value Date    TSH 3.320 10/18/2021      Vent Information  Equipment Changed: Mask  SpO2: 97 %  Mask Type: Nasal pillows      U/A:     Lab Results   Component Value Date    COLORU Yellow 10/12/2021    PHUR 5.5 10/12/2021    WBCUA NONE 10/12/2021    RBCUA 10-20 10/12/2021    BACTERIA FEW 10/12/2021    CLARITYU SL CLOUDY 10/12/2021    SPECGRAV >=1.030 10/12/2021    LEUKOCYTESUR Negative 10/12/2021

## 2021-10-23 NOTE — PROGRESS NOTES
PROGRESS NOTE       PATIENT PROBLEM LIST:  Active Problems:    Diabetes mellitus type 2, insulin dependent ([facility] [address]    Hyperlipidemia    Acute kidney injury ([facility] [address]    Hyponatremia    Metabolic acidosis    Acute respiratory distress    Multiple vessel coronary artery disease    Clostridioides difficile diarrhea    history of  pancreatitis due to biliary obstruction  Resolved Problems:    * No resolved hospital problems. *      SUBJECTIVE:  Claudell Roderick states he is anxious to get transferred to the [facility] so that he can return to a normal lifestyle. He denies any diarrhea presently. Elke Aguiar REVIEW OF SYSTEMS:  General ROS: negative for - fatigue, malaise,  weight gain or weight loss  Psychological ROS: positive for - anxiety and depression  Ophthalmic ROS: negative for - decreased vision or visual distortion. ENT ROS: negative  Allergy and Immunology ROS: negative  Hematological and Lymphatic ROS: negative  Endocrine: no heat or cold intolerance and no polyphagia, polydipsia, or polyuria  Respiratory ROS: positive for - cough and shortness of breath  Cardiovascular ROS: positive for - dyspnea on exertion, irregular heartbeat and shortness of breath. Gastrointestinal ROS: no abdominal pain, change in bowel habits, or black or bloody stools  Genito-Urinary ROS: no nocturia, dysuria, trouble voiding, frequency or hematuria  Musculoskeletal ROS: negative for- myalgias, arthralgias, or claudication  Neurological ROS: no TIA or stroke symptoms otherwise no significant change in symptoms or problems since yesterday as documented in previous progress notes.     SCHEDULED MEDICATIONS:   [START ON 10/23/2021] bumetanide  0.5 mg IntraVENous Once    insulin lispro  0-6 Units SubCUTAneous TID WC    insulin lispro  0-3 Units SubCUTAneous Nightly    apixaban  5 mg Oral BID    guaiFENesin  400 mg Oral TID    sodium chloride flush  5-40 mL IntraVENous 2 times per day    pantoprazole  40 mg Oral QAM AC    amLODIPine  5 mg Oral Daily  levalbuterol  0.63 mg Nebulization TID    Fidaxomicin  200 mg Oral BID    lactobacillus  1 capsule Oral Daily    influenza virus vaccine  0.5 mL IntraMUSCular Prior to discharge    rosuvastatin  10 mg Oral QPM       VITAL SIGNS:                                                                                                                          /66   Pulse 50   Temp 98.1 °F (36.7 °C) (Temporal)   Resp 20   Ht 5' 7\" (1.702 m)   Wt 280 lb (127 kg)   SpO2 100%   BMI 43.85 kg/m²   No data found. OBJECTIVE:    HEENT: PERRL, EOM  Intact; sclera non-icteric, conjunctiva pink. Carotids are brisk in upstroke with normal contour. No carotid bruits. Normal jugular venous pulsation at 45°. No palpable cervical nor supraclavicular nodes. Thyroid not palpable. Trachea midline. Chest: Even excursion  Lungs: CTA B, no expiratory wheezes or rhonchi, no decreased tactile fremitus without inspiratory rales. Heart: Regular  rhythm; S1 > S2, no gallop or murmur. No clicks, rub, palpable thrills   or heaves. PMI nondisplaced, 5th intercostal space MCL. Abdomen: Soft, nontender, nondistended, + + grossly protuberant, no masses or organomegaly. Bowel sounds active.right femoral catheterization site without hematoma  Extremities: Without clubbing, cyanosis or edema. Pulses present 3+ upper extermities bilaterally; present 1+ DP and present 1+ PT bilaterally.      Data:   Scheduled Meds: Reviewed  Continuous Infusions:    sodium chloride      dextrose       No intake or output data in the 24 hours ending 10/22/21 2313  CBC:   Recent Labs     10/21/21  0720   WBC 7.7   HGB 11.2*   HCT 36.3*        BMP:  Recent Labs     10/20/21  0606 10/20/21  0606 10/21/21  0720 10/21/21  0720 10/22/21  0532     --  138  --  139   K 4.0  --  4.2  --  4.0     --  103  --  104   CO2 23  --  22  --  21*   BUN 18  --  17  --  18   CREATININE 1.4*  --  1.3*  --  1.3*   LABGLOM 49   < > 53   < > 53    < > = values in this interval not displayed. ABGs:   Lab Results   Component Value Date    PH 7.410 10/13/2021    PO2 75.6 10/13/2021    PCO2 29.6 10/13/2021     INR: No results for input(s): INR in the last 72 hours. PRO-BNP:   Lab Results   Component Value Date    PROBNP 10,291 (H) 10/16/2021    PROBNP 11,016 (H) 10/13/2021      TSH:   Lab Results   Component Value Date    TSH 3.320 10/18/2021      Cardiac Injury Profile:   Lab Results   Component Value Date    TROPHS 165 (H) 10/12/2021      Lipid Profile:   Lab Results   Component Value Date    TRIG 281 10/13/2021    HDL 11 10/13/2021    LDLCALC 30 10/13/2021    CHOL 97 10/13/2021      Hemoglobin A1C: No components found for: HGBA1C      RAD:   Echo Complete    Result Date: 10/15/2021  Transthoracic Echocardiography Report (TTE)  Demographics   Patient Name    Te Covington Gender            Male                  A   Medical Record  [id number]     Room Number       8506  Number   Account #       [de-identified]    Procedure Date    10/14/2021   Corporate ID                 Ordering          Cullen Marie MD                               Physician   Accession       [id number]   Referring  Number                       Physician   Date of Birth   1940   [address] [facility]   Age             [de-identified] year(s)   Interpreting      [address] [place] [address]                               Physician         Physician Cardiology                                                 Oziel Gr MD                                Any Other  Procedure Type of Study   TTE procedure:Echo Complete W/Doppler & Color Flow. Procedure Date Date: 10/14/2021 Start: 03:36 PM Study Location: Portable Technical Quality: Adequate visualization Indications:Congestive heart failure. Patient Status: Routine Contrast Medium: Definity. Height: 67 inches Weight: 280 pounds BSA: 2.33 m^2 BMI: 43.85 kg/m^2 BP: 152/79 mmHg  Findings   Left Ventricle  Moderate asymmetric septal hypertrophy septal wall 1.6cm.   Mild global LV hypokineses  Ejection fraction is visually estimated at 40 to 45%. Right Ventricle  Normal right ventricular size and function. Left Atrium  The left atrium is mildly dilated. Right Atrium  Mildly enlarged right atrium size. Mitral Valve  Mild mitral regurgitation is present. Tricuspid Valve  Mild tricuspid regurgitation. Aortic Valve  The aortic valve appears mildly sclerotic. No hemodynamically significant aortic stenosis is present. Pulmonic Valve  The pulmonic valve was not well visualized. Pericardial Effusion  Fat pad present. Conclusions   Summary  Moderate asymmetric septal hypertrophy septal wall 1.6cm. Mild global LV hypokineses  Ejection fraction is visually estimated at 40 to 45%. Normal right ventricular size and function. Mildly enlarged right atrium size. Mild mitral regurgitation is present. The aortic valve appears mildly sclerotic. No hemodynamically significant aortic stenosis is present. Mild tricuspid regurgitation.    Signature   ----------------------------------------------------------------  Electronically signed by Kerrie Slater MD(Interpreting  physician) on 10/15/2021 12:36 PM  ----------------------------------------------------------------  M-Mode/2D Measurements & Calculations   LV Diastolic    LV Systolic Dimension: 3.9   AV Cusp Separation: 1.3 cmLA  Dimension: 4.9  cm                           Dimension: 4 cmAO Root  cm              LV Volume Diastolic: 731.9   Dimension: 3.6 cm  LV FS:20.4 %    ml  LV PW           LV Volume Systolic: 10.3 ml  Diastolic: 1.3  LV EDV/LV EDV Index: 113.9  cm              ml/49 ml/m^2LV ESV/LV ESV    RV Diastolic Dimension: 2.5  Septum          Index: 67.1 ml/29ml/ m^2     cm  Diastolic: 1.6  EF Calculated: 41.1 %  cm              LV Mass Index: 128 l/min*m^2 Ascending Aorta: 2.7 cm                  LV Length: 8.9 cm            LA volume/Index: 87.8 ml  LV Mass: 297.54                              /37.53ml/m^2  g LVOT: 2.1 cm                 RA Area: 18.7 cm^2  Doppler Measurements & Calculations   MV Peak E-Wave: 1.12 AV Peak Velocity:     LVOT Peak Velocity: 0.94 m/s  m/s                  1.92 m/s              LVOT Mean Velocity: 0.71 m/s  MV Peak A-Wave: 0.29 AV Peak Gradient:     LVOT Peak Gradient: 3.5  m/s                  14.71 mmHg            mmHgLVOT Mean Gradient: 2.2  MV E/A Ratio: 3.89   AV Mean Velocity:     mmHg  MV Peak Gradient:    1.52 m/s              Estimated RVSP: 15.3 mmHg  4.8 mmHg             AV Mean Gradient: 9.8 Estimated RAP:3 mmHg  MV Mean Gradient:    mmHg  1.6 mmHg             AV VTI: 34.2 cm  MV Mean Velocity:    AV Area               TR Velocity:1.76 m/s  0.57 m/s             (Continuity):1.63     TR Gradient:12.35 mmHg  MV Deceleration      cm^2                  PV Peak Velocity: 1.31 m/s  Time: 178.2 msec                           PV Peak Gradient: 6.83 mmHg  MV P1/2t: 50.8 msec  LVOT VTI: 16.1 cm     PV Mean Velocity: 0.8 m/s  MVA by PHT:4.33 cm^2                       PV Mean Gradient: 2.9 mmHg  MV Area              Estimated PASP: 15.35  (continuity): 2.4    mmHg  cm^2  MV E' Septal  Velocity: 0.05 m/s  MV E' Lateral  Velocity: 10 m/s  [id number]    XR CHEST (2 VW)    Result Date: 10/20/2021  EXAMINATION: TWO XRAY VIEWS OF THE CHEST 10/20/2021 10:28 am COMPARISON: 10/13/2021 HISTORY: ORDERING SYSTEM PROVIDED HISTORY: cough TECHNOLOGIST PROVIDED HISTORY: Reason for exam:->cough What reading provider will be dictating this exam?->CRC FINDINGS: There is a right lung base infiltrate. Right upper lobe and left lung clear. The cardiac silhouette is within normal limits. There is no pleural effusion.      1. Right lower lobe infiltrate     XR CHEST (2 VW)    Result Date: 10/11/2021  EXAMINATION: TWO XRAY VIEWS OF THE CHEST 10/11/2021 11:41 am COMPARISON: 09/18/2014 HISTORY: ORDERING SYSTEM PROVIDED HISTORY: cp TECHNOLOGIST PROVIDED HISTORY: Reason for exam:->cp What reading provider will be dictating this exam?->CRC FINDINGS: Heart size is normal.  There are no infiltrates or effusions. Normal chest     XR ABDOMEN (KUB) (SINGLE AP VIEW)    Result Date: 10/13/2021  EXAMINATION: ONE SUPINE XRAY VIEW(S) OF THE ABDOMEN 10/13/2021 8:41 pm COMPARISON: None. HISTORY: ORDERING SYSTEM PROVIDED HISTORY: ileus TECHNOLOGIST PROVIDED HISTORY: Reason for exam:->ileus What reading provider will be dictating this exam?->CRC FINDINGS: The bowel gas pattern is nonobstructive. Status post cholecystectomy and posterior body fusion at L5-S1. Nonobstructive bowel gas pattern. No evidence of ileus. NM LUNG VENT/PERFUSION (VQ)    Result Date: 10/12/2021  EXAMINATION: NUCLEAR MEDICINE VENTILATION PERFUSION SCAN. 10/12/2021 TECHNIQUE: 63 millicuries aerosolized Tc99m DTPA was administered via mask prior to planar imaging of the lungs in multiple projections. Then, 7.5 millicuries of Tc 06J MAA was administered intravenously prior to planar imaging of the lungs in similar projections. COMPARISON: Chest radiograph 10/11/2021. HISTORY: ORDERING SYSTEM PROVIDED HISTORY: elevated d dimer exclude PE TECHNOLOGIST PROVIDED HISTORY: Reason for exam:->elevated d dimer exclude PE What reading provider will be dictating this exam?->CRC FINDINGS: PERFUSION: Mildly heterogeneous distribution of radiotracer. No unmatched segmental or subsegmental perfusion defects. VENTILATION: Heterogeneous distribution of radiotracer. There is some central airway deposition as can be seen with obstructive airways disease. CHEST RADIOGRAPH: No focal areas of consolidation or significant effusions on recent chest radiograph. Very low probability for pulmonary embolism.      XR CHEST PORTABLE    Result Date: 10/13/2021  EXAMINATION: ONE XRAY VIEW OF THE CHEST 10/13/2021 2:23 pm COMPARISON: October 11, 2021 HISTORY: ORDERING SYSTEM PROVIDED HISTORY: wheezing TECHNOLOGIST PROVIDED HISTORY: Reason for exam:->wheezing What reading provider will be dictating this exam?->CRC FINDINGS: On today's study the heart silhouette appears to be larger than previous. There also appears to be mild increase in pulmonary vascularity which could suggest mild vascular congestion. Trachea is midline. No effusions are seen. No pneumothorax is identified. Visualized bony thorax shows no acute abnormality. Cardiac silhouette appears somewhat larger although this is a portable image and there also appears to be slight increase in pulmonary vascularity. No other acute findings identified. US RETROPERITONEAL COMPLETE    Result Date: 10/12/2021  EXAMINATION: RETROPERITONEAL ULTRASOUND OF THE KIDNEYS AND URINARY BLADDER 10/12/2021 COMPARISON: None HISTORY: ORDERING SYSTEM PROVIDED HISTORY: acute kidney injury TECHNOLOGIST PROVIDED HISTORY: Reason for exam:->acute kidney injury What reading provider will be dictating this exam?->CRC FINDINGS: Kidneys: The right kidney measures 10.6 x 6.5 x 5.4 cm and the left kidney measures 12.5 x 4.6 x 6 cm Kidneys demonstrate normal cortical echogenicity. No evidence of hydronephrosis or intrarenal stones. 1.7 cm simple appearing cyst identified in the left and right midpole. No hydronephrosis. No renal calculus identified Simple appearing cysts in bilateral kidneys. US DUP LOWER EXTREMITIES BILATERAL VENOUS    Result Date: 10/12/2021  EXAMINATION: DUPLEX VENOUS ULTRASOUND OF THE BILATERAL LOWER [id number]/12/2021 1:45 pm TECHNIQUE: Duplex ultrasound using B-mode/gray scaled imaging, Doppler spectral analysis and color flow Doppler was obtained of the deep venous structures of the lower bilateral extremities. COMPARISON: None.  HISTORY: ORDERING SYSTEM PROVIDED HISTORY: elevated d dimer assess for DVT TECHNOLOGIST PROVIDED HISTORY: Reason for exam:->elevated d dimer assess for DVT What reading provider will be dictating this exam?->CRC FINDINGS: The visualized veins of the bilateral lower extremities are patent and free of echogenic thrombus. The veins demonstrate good compressibility with normal color flow study and spectral analysis. No evidence of DVT in either lower extremity. Cardiac Catheterization: 10/18/2021  SUMMARY OF INJECTIONS:  II.  Selective coronary arteriograms. a. Right coronary artery nondominant, a congenitally small vessel with  multiple areas of 85% stenosis seen proximally in the mid third segment  before the takeoff of the right ventricular branches. The distal vessel  was of smaller luminal caliber, but patent. b.  Left coronary - preponderant. 1.  Left main trunk. 10%-15% distal tapering stenosis. 2.  Left anterior descending what appears to be a high-grade eccentric  stenosis in the origin best visualized in the caudal cranial CONCEPCION  Projection including evidence for moderate calcification followed by a 75%-80% stenosis at the level of the first  septal . Immediately prior to this, there is a takeoff of the  diagonal branch, which has 75%-80% stenosis followed by a second area in  the mid segment of 75%-80% stenosis. The distal vessel was  small-to-moderate, luminal caliber. The distal anterior descending  artery is of moderate luminal caliber and widely patent.     III. Circumflex. a. Dominant vessel giving rise to the posterior descending and  posterior ventricular branches. In the first marginal branch, there was  subtotal occlusion with the distal vessel appearing to have a stenosis  at the junction of the proximal mid third segment. The distal vessel  was grossly under profuse. In the distal circumflex, there was a  75%-80% stenosis leading into the bifurcation takeoff of the posterior  ventricular and posterior descending branches. In the posterior  ventricular branch, there appears to be moderate stenosis as well. III.  Injection of left subclavian artery    The left subclavian artery is a large luminal caliber giving rise to a large left internal mammary artery. IV. Left ventricular angiogram.  Left ventricular cavity size is upper  limits of normal with global normal left ventricular systolic function. Estimated left ventricular ejection fraction 55%.     CONCLUSIONS:  1. Severely stenotic native coronary atherosclerosis with coronary  artery calcification. 2.  Well-preserved left ventricular systolic function with abnormal  diastolic dysfunction. NB difficulty was encountered in cannulating and  maintaining the left coronary catheter in the os of the left main  coronary artery in addition to the patient's body habitus. 3.  Widely patent large lumen left subclavian and left internal mammary arteries    EKG: See Report  Echo: See Report      IMPRESSIONS:  Active Problems:    Diabetes mellitus type 2, insulin dependent (HCC)    Hyperlipidemia    Acute kidney injury ([address]    Hyponatremia    Metabolic acidosis    Acute respiratory distress    Multiple vessel coronary artery disease    Clostridioides difficile diarrhea    history of  pancreatitis due to biliary obstruction  Resolved Problems:    * No resolved hospital problems. *      RECOMMENDATIONS:  Awaiting transfer to [facility] as arrangements have been made. Renal function remains stable presently. Heart rate has decreased and sotalol now on hold. I have spent more than 25 minutes face to face with Kiko Hickey and reviewing notes and laboratory data, with greater than 50% of this time instructing and counseling the patient and his family members face to face regarding my findings and recommendations and I have answered all questions as posed to me by Mr. Odette Silveira as well as his daughter. Linda Lamb, DO FACP,FACC,FSCAI      NOTE:  This report was transcribed using voice recognition software.   Every effort was made to ensure accuracy; however, inadvertent computerized transcription errors may be present

## 2021-10-23 NOTE — PLAN OF CARE
Problem: Falls - Risk of:  Goal: Will remain free from falls  Description: Will remain free from falls  Outcome: Met This Shift  Goal: Absence of physical injury  Description: Absence of physical injury  Outcome: Met This Shift     Problem: Musculor/Skeletal Functional Status  Goal: Highest potential functional level  Outcome: Met This Shift  Goal: Absence of falls  Outcome: Met This Shift     Problem: Pain:  Goal: Pain level will decrease  Description: Pain level will decrease  Outcome: Met This Shift  Goal: Control of acute pain  Description: Control of acute pain  Outcome: Met This Shift  Goal: Control of chronic pain  Description: Control of chronic pain  Outcome: Met This Shift     Problem: Skin Integrity:  Goal: Will show no infection signs and symptoms  Description: Will show no infection signs and symptoms  Outcome: Met This Shift  Goal: Absence of new skin breakdown  Description: Absence of new skin breakdown  Outcome: Met This Shift

## 2021-10-24 LAB
ANION GAP SERPL CALCULATED.3IONS-SCNC: 12 MMOL/L (ref 7–16)
BUN BLDV-MCNC: 15 MG/DL (ref 6–23)
CALCIUM SERPL-MCNC: 9.5 MG/DL (ref 8.6–10.2)
CHLORIDE BLD-SCNC: 103 MMOL/L (ref 98–107)
CO2: 22 MMOL/L (ref 22–29)
CREAT SERPL-MCNC: 1.2 MG/DL (ref 0.7–1.2)
EKG ATRIAL RATE: 69 BPM
EKG Q-T INTERVAL: 430 MS
EKG QRS DURATION: 84 MS
EKG QTC CALCULATION (BAZETT): 450 MS
EKG R AXIS: 7 DEGREES
EKG T AXIS: -106 DEGREES
EKG VENTRICULAR RATE: 66 BPM
GFR AFRICAN AMERICAN: >60
GFR NON-AFRICAN AMERICAN: 58 ML/MIN/1.73
GLUCOSE BLD-MCNC: 103 MG/DL (ref 74–99)
METER GLUCOSE: 109 MG/DL (ref 74–99)
METER GLUCOSE: 128 MG/DL (ref 74–99)
METER GLUCOSE: 134 MG/DL (ref 74–99)
METER GLUCOSE: 137 MG/DL (ref 74–99)
POTASSIUM SERPL-SCNC: 3.9 MMOL/L (ref 3.5–5)
SODIUM BLD-SCNC: 137 MMOL/L (ref 132–146)

## 2021-10-24 PROCEDURE — 6370000000 HC RX 637 (ALT 250 FOR IP): Performed by: INTERNAL MEDICINE

## 2021-10-24 PROCEDURE — 2580000003 HC RX 258: Performed by: INTERNAL MEDICINE

## 2021-10-24 PROCEDURE — 6360000002 HC RX W HCPCS: Performed by: INTERNAL MEDICINE

## 2021-10-24 PROCEDURE — 82962 GLUCOSE BLOOD TEST: CPT

## 2021-10-24 PROCEDURE — 6370000000 HC RX 637 (ALT 250 FOR IP): Performed by: NURSE PRACTITIONER

## 2021-10-24 PROCEDURE — 6370000000 HC RX 637 (ALT 250 FOR IP): Performed by: FAMILY MEDICINE

## 2021-10-24 PROCEDURE — 94640 AIRWAY INHALATION TREATMENT: CPT

## 2021-10-24 PROCEDURE — 99232 SBSQ HOSP IP/OBS MODERATE 35: CPT | Performed by: INTERNAL MEDICINE

## 2021-10-24 PROCEDURE — 80048 BASIC METABOLIC PNL TOTAL CA: CPT

## 2021-10-24 PROCEDURE — 36415 COLL VENOUS BLD VENIPUNCTURE: CPT

## 2021-10-24 PROCEDURE — 94660 CPAP INITIATION&MGMT: CPT

## 2021-10-24 PROCEDURE — 2060000000 HC ICU INTERMEDIATE R&B

## 2021-10-24 RX ORDER — FUROSEMIDE 10 MG/ML
40 INJECTION INTRAMUSCULAR; INTRAVENOUS ONCE
Status: COMPLETED | OUTPATIENT
Start: 2021-10-24 | End: 2021-10-24

## 2021-10-24 RX ADMIN — ROSUVASTATIN 10 MG: 10 TABLET, FILM COATED ORAL at 17:55

## 2021-10-24 RX ADMIN — LEVALBUTEROL HYDROCHLORIDE 0.63 MG: 0.63 SOLUTION RESPIRATORY (INHALATION) at 08:54

## 2021-10-24 RX ADMIN — FIDAXOMICIN 200 MG: 200 TABLET, FILM COATED ORAL at 08:56

## 2021-10-24 RX ADMIN — PANTOPRAZOLE SODIUM 40 MG: 40 TABLET, DELAYED RELEASE ORAL at 06:09

## 2021-10-24 RX ADMIN — LEVALBUTEROL HYDROCHLORIDE 0.63 MG: 0.63 SOLUTION RESPIRATORY (INHALATION) at 20:28

## 2021-10-24 RX ADMIN — FIDAXOMICIN 200 MG: 200 TABLET, FILM COATED ORAL at 21:18

## 2021-10-24 RX ADMIN — LEVALBUTEROL HYDROCHLORIDE 0.63 MG: 0.63 SOLUTION RESPIRATORY (INHALATION) at 13:22

## 2021-10-24 RX ADMIN — GUAIFENESIN 400 MG: 400 TABLET ORAL at 20:25

## 2021-10-24 RX ADMIN — GUAIFENESIN 400 MG: 400 TABLET ORAL at 14:52

## 2021-10-24 RX ADMIN — Medication 1 CAPSULE: at 08:56

## 2021-10-24 RX ADMIN — APIXABAN 5 MG: 5 TABLET, FILM COATED ORAL at 08:56

## 2021-10-24 RX ADMIN — AMLODIPINE BESYLATE 5 MG: 5 TABLET ORAL at 08:56

## 2021-10-24 RX ADMIN — GUAIFENESIN 400 MG: 400 TABLET ORAL at 08:56

## 2021-10-24 RX ADMIN — Medication 10 ML: at 08:56

## 2021-10-24 RX ADMIN — APIXABAN 5 MG: 5 TABLET, FILM COATED ORAL at 20:25

## 2021-10-24 RX ADMIN — FUROSEMIDE 40 MG: 10 INJECTION, SOLUTION INTRAMUSCULAR; INTRAVENOUS at 14:52

## 2021-10-24 RX ADMIN — Medication 10 ML: at 23:12

## 2021-10-24 ASSESSMENT — PAIN SCALES - GENERAL
PAINLEVEL_OUTOF10: 0

## 2021-10-24 NOTE — PROGRESS NOTES
PROGRESS NOTE       PATIENT PROBLEM LIST:  Active Problems:    Diabetes mellitus type 2, insulin dependent ([facility]    Hyperlipidemia    Acute kidney injury ([facility]    Hyponatremia    Metabolic acidosis    Acute respiratory distress    Multiple vessel coronary artery disease    Clostridioides difficile diarrhea    history of  pancreatitis due to biliary obstruction  Resolved Problems:    * No resolved hospital problems. *      SUBJECTIVE:  Sabrina Villela states he feels significantly improved but is somewhat frustrated that he has not been transferred as of yet for definitive cardiac intervention. He is questioning whether he could go home and follow-up as an outpatient at this point. REVIEW OF SYSTEMS:  General ROS: negative for - fatigue, malaise,  weight gain or weight loss  Psychological ROS: positive for - anxiety and depression  Ophthalmic ROS: negative for - decreased vision or visual distortion. ENT ROS: negative  Allergy and Immunology ROS: negative  Hematological and Lymphatic ROS: negative  Endocrine: no heat or cold intolerance and no polyphagia, polydipsia, or polyuria  Respiratory ROS: positive for - cough and shortness of breath  Cardiovascular ROS: positive for - dyspnea on exertion, irregular heartbeat and shortness of breath. Gastrointestinal ROS: no abdominal pain, change in bowel habits, or black or bloody stools  Genito-Urinary ROS: no nocturia, dysuria, trouble voiding, frequency or hematuria  Musculoskeletal ROS: negative for- myalgias, arthralgias, or claudication  Neurological ROS: no TIA or stroke symptoms otherwise no significant change in symptoms or problems since yesterday as documented in previous progress notes.     SCHEDULED MEDICATIONS:   insulin lispro  0-6 Units SubCUTAneous TID WC    insulin lispro  0-3 Units SubCUTAneous Nightly    apixaban  5 mg Oral BID    guaiFENesin  400 mg Oral TID    sodium chloride flush  5-40 mL IntraVENous 2 times per day    pantoprazole  40 mg Oral QAM AC    amLODIPine  5 mg Oral Daily    levalbuterol  0.63 mg Nebulization TID    Fidaxomicin  200 mg Oral BID    lactobacillus  1 capsule Oral Daily    influenza virus vaccine  0.5 mL IntraMUSCular Prior to discharge    rosuvastatin  10 mg Oral QPM       VITAL SIGNS:                                                                                                                          /76   Pulse 57   Temp 97.6 °F (36.4 °C) (Temporal)   Resp 18   Ht 5' 7\" (1.702 m)   Wt 280 lb (127 kg)   SpO2 99%   BMI 43.85 kg/m²   No data found. OBJECTIVE:    HEENT: PERRL, EOM  Intact; sclera non-icteric, conjunctiva pink. Carotids are brisk in upstroke with normal contour. No carotid bruits. Normal jugular venous pulsation at 45°. No palpable cervical nor supraclavicular nodes. Thyroid not palpable. Trachea midline. Chest: Even excursion  Lungs: CTA B, no expiratory wheezes or rhonchi, no decreased tactile fremitus without inspiratory rales. Heart: Regular  rhythm; S1 > S2, no gallop or murmur. No clicks, rub, palpable thrills   or heaves. PMI nondisplaced, 5th intercostal space MCL. Abdomen: Soft, nontender, nondistended, + + grossly protuberant, no masses or organomegaly. Bowel sounds active.right femoral catheterization site without hematoma  Extremities: Without clubbing, cyanosis or edema. Pulses present 3+ upper extermities bilaterally; present 1+ DP and present 1+ PT bilaterally.      Data:   Scheduled Meds: Reviewed  Continuous Infusions:    sodium chloride      dextrose         Intake/Output Summary (Last 24 hours) at 10/24/2021 1533  Last data filed at 10/24/2021 1407  Gross per 24 hour   Intake 870 ml   Output --   Net 870 ml     CBC:   Recent Labs     10/23/21  0455   WBC 7.1   HGB 11.2*   HCT 36.0*        BMP:  Recent Labs     10/22/21  0532 10/22/21  0532 10/23/21  0455 10/23/21  0455 10/24/21  0607     --  139  --  137   K 4.0  --  4.3  --  3.9     --  105  -- 103   CO2 21*  --  25  --  22   BUN 18  --  16  --  15   CREATININE 1.3*  --  1.4*  --  1.2   LABGLOM 53   < > 49   < > 58    < > = values in this interval not displayed. ABGs:   Lab Results   Component Value Date    PH 7.410 10/13/2021    PO2 75.6 10/13/2021    PCO2 29.6 10/13/2021     INR: No results for input(s): INR in the last 72 hours. PRO-BNP:   Lab Results   Component Value Date    PROBNP 10,291 (H) 10/16/2021    PROBNP 11,016 (H) 10/13/2021      TSH:   Lab Results   Component Value Date    TSH 3.320 10/18/2021      Cardiac Injury Profile:   Lab Results   Component Value Date    TROPHS 165 (H) 10/12/2021      Lipid Profile:   Lab Results   Component Value Date    TRIG 281 10/13/2021    HDL 11 10/13/2021    LDLCALC 30 10/13/2021    CHOL 97 10/13/2021      Hemoglobin A1C: No components found for: HGBA1C      RAD:   Echo Complete    Result Date: 10/15/2021  Transthoracic Echocardiography Report (TTE)  Demographics   Patient Name    Ruthy Soto Gender            Male                  A   Medical Record  [id number]     Room Number       8506  Number   Account #       [de-identified]    Procedure Date    10/14/2021   Corporate ID                 Ordering          Reina Appiah MD                               Physician   Accession       [id number]   Referring  Number                       Physician   Date of Birth   1940   [address] [facility]   Age             [de-identified] year(s)   Interpreting      [address] [place] [address]                               Physician         Physician Cardiology                                                 Sera Gr MD                                Any Other  Procedure Type of Study   TTE procedure:Echo Complete W/Doppler & Color Flow. Procedure Date Date: 10/14/2021 Start: 03:36 PM Study Location: Portable Technical Quality: Adequate visualization Indications:Congestive heart failure. Patient Status: Routine Contrast Medium: Definity.  Height: 67 inches Weight: 280 pounds BSA: 2.33 m^2 BMI: 43.85 kg/m^2 BP: 152/79 mmHg  Findings   Left Ventricle  Moderate asymmetric septal hypertrophy septal wall 1.6cm. Mild global LV hypokineses  Ejection fraction is visually estimated at 40 to 45%. Right Ventricle  Normal right ventricular size and function. Left Atrium  The left atrium is mildly dilated. Right Atrium  Mildly enlarged right atrium size. Mitral Valve  Mild mitral regurgitation is present. Tricuspid Valve  Mild tricuspid regurgitation. Aortic Valve  The aortic valve appears mildly sclerotic. No hemodynamically significant aortic stenosis is present. Pulmonic Valve  The pulmonic valve was not well visualized. Pericardial Effusion  Fat pad present. Conclusions   Summary  Moderate asymmetric septal hypertrophy septal wall 1.6cm. Mild global LV hypokineses  Ejection fraction is visually estimated at 40 to 45%. Normal right ventricular size and function. Mildly enlarged right atrium size. Mild mitral regurgitation is present. The aortic valve appears mildly sclerotic. No hemodynamically significant aortic stenosis is present. Mild tricuspid regurgitation.    Signature   ----------------------------------------------------------------  Electronically signed by Kerrie Sltaer MD(Interpreting  physician) on 10/15/2021 12:36 PM  ----------------------------------------------------------------  M-Mode/2D Measurements & Calculations   LV Diastolic    LV Systolic Dimension: 3.9   AV Cusp Separation: 1.3 cmLA  Dimension: 4.9  cm                           Dimension: 4 cmAO Root  cm              LV Volume Diastolic: 706.1   Dimension: 3.6 cm  LV FS:20.4 %    ml  LV PW           LV Volume Systolic: 67.7 ml  Diastolic: 1.3  LV EDV/LV EDV Index: 113.9  cm              ml/49 ml/m^2LV ESV/LV ESV    RV Diastolic Dimension: 2.5  Septum          Index: 67.1 ml/29ml/ m^2     cm  Diastolic: 1.6  EF Calculated: 41.1 %  cm              LV Mass Index: 128 l/min*m^2 Ascending Aorta: 2.7 cm LV Length: 8.9 cm            LA volume/Index: 87.8 ml  LV Mass: 297.54                              /37.53ml/m^2  g               LVOT: 2.1 cm                 RA Area: 18.7 cm^2  Doppler Measurements & Calculations   MV Peak E-Wave: 1.12 AV Peak Velocity:     LVOT Peak Velocity: 0.94 m/s  m/s                  1.92 m/s              LVOT Mean Velocity: 0.71 m/s  MV Peak A-Wave: 0.29 AV Peak Gradient:     LVOT Peak Gradient: 3.5  m/s                  14.71 mmHg            mmHgLVOT Mean Gradient: 2.2  MV E/A Ratio: 3.89   AV Mean Velocity:     mmHg  MV Peak Gradient:    1.52 m/s              Estimated RVSP: 15.3 mmHg  4.8 mmHg             AV Mean Gradient: 9.8 Estimated RAP:3 mmHg  MV Mean Gradient:    mmHg  1.6 mmHg             AV VTI: 34.2 cm  MV Mean Velocity:    AV Area               TR Velocity:1.76 m/s  0.57 m/s             (Continuity):1.63     TR Gradient:12.35 mmHg  MV Deceleration      cm^2                  PV Peak Velocity: 1.31 m/s  Time: 178.2 msec                           PV Peak Gradient: 6.83 mmHg  MV P1/2t: 50.8 msec  LVOT VTI: 16.1 cm     PV Mean Velocity: 0.8 m/s  MVA by PHT:4.33 cm^2                       PV Mean Gradient: 2.9 mmHg  MV Area              Estimated PASP: 15.35  (continuity): 2.4    mmHg  cm^2  MV E' Septal  Velocity: 0.05 m/s  MV E' Lateral  Velocity: 10 m/s  [email or web address] [id number]    XR CHEST (2 VW)    Result Date: 10/20/2021  EXAMINATION: TWO XRAY VIEWS OF THE CHEST 10/20/2021 10:28 am COMPARISON: 10/13/2021 HISTORY: ORDERING SYSTEM PROVIDED HISTORY: cough TECHNOLOGIST PROVIDED HISTORY: Reason for exam:->cough What reading provider will be dictating this exam?->CRC FINDINGS: There is a right lung base infiltrate. Right upper lobe and left lung clear. The cardiac silhouette is within normal limits. There is no pleural effusion.      1. Right lower lobe infiltrate     XR CHEST (2 VW)    Result Date: 10/11/2021  EXAMINATION: TWO XRAY VIEWS OF THE CHEST 10/11/2021 11:41 am COMPARISON: 09/18/2014 HISTORY: ORDERING SYSTEM PROVIDED HISTORY: cp TECHNOLOGIST PROVIDED HISTORY: Reason for exam:->cp What reading provider will be dictating this exam?->CRC FINDINGS: Heart size is normal.  There are no infiltrates or effusions. Normal chest     XR ABDOMEN (KUB) (SINGLE AP VIEW)    Result Date: 10/13/2021  EXAMINATION: ONE SUPINE XRAY VIEW(S) OF THE ABDOMEN 10/13/2021 8:41 pm COMPARISON: None. HISTORY: ORDERING SYSTEM PROVIDED HISTORY: ileus TECHNOLOGIST PROVIDED HISTORY: Reason for exam:->ileus What reading provider will be dictating this exam?->CRC FINDINGS: The bowel gas pattern is nonobstructive. Status post cholecystectomy and posterior body fusion at L5-S1. Nonobstructive bowel gas pattern. No evidence of ileus. NM LUNG VENT/PERFUSION (VQ)    Result Date: 10/12/2021  EXAMINATION: NUCLEAR MEDICINE VENTILATION PERFUSION SCAN. 10/12/2021 TECHNIQUE: 63 millicuries aerosolized Tc99m DTPA was administered via mask prior to planar imaging of the lungs in multiple projections. Then, 7.5 millicuries of Tc 00H MAA was administered intravenously prior to planar imaging of the lungs in similar projections. COMPARISON: Chest radiograph 10/11/2021. HISTORY: ORDERING SYSTEM PROVIDED HISTORY: elevated d dimer exclude PE TECHNOLOGIST PROVIDED HISTORY: Reason for exam:->elevated d dimer exclude PE What reading provider will be dictating this exam?->CRC FINDINGS: PERFUSION: Mildly heterogeneous distribution of radiotracer. No unmatched segmental or subsegmental perfusion defects. VENTILATION: Heterogeneous distribution of radiotracer. There is some central airway deposition as can be seen with obstructive airways disease. CHEST RADIOGRAPH: No focal areas of consolidation or significant effusions on recent chest radiograph. Very low probability for pulmonary embolism. XR CHEST PORTABLE    Result Date: 10/13/2021  EXAMINATION: ONE XRAY VIEW OF THE CHEST 10/13/2021 2:23 pm COMPARISON: October 11, 2021 HISTORY: ORDERING SYSTEM PROVIDED HISTORY: wheezing TECHNOLOGIST PROVIDED HISTORY: Reason for exam:->wheezing What reading provider will be dictating this exam?->CRC FINDINGS: On today's study the heart silhouette appears to be larger than previous. There also appears to be mild increase in pulmonary vascularity which could suggest mild vascular congestion. Trachea is midline. No effusions are seen. No pneumothorax is identified. Visualized bony thorax shows no acute abnormality. Cardiac silhouette appears somewhat larger although this is a portable image and there also appears to be slight increase in pulmonary vascularity. No other acute findings identified. US RETROPERITONEAL COMPLETE    Result Date: 10/12/2021  EXAMINATION: RETROPERITONEAL ULTRASOUND OF THE KIDNEYS AND URINARY BLADDER 10/12/2021 COMPARISON: None HISTORY: ORDERING SYSTEM PROVIDED HISTORY: acute kidney injury TECHNOLOGIST PROVIDED HISTORY: Reason for exam:->acute kidney injury What reading provider will be dictating this exam?->CRC FINDINGS: Kidneys: The right kidney measures 10.6 x 6.5 x 5.4 cm and the left kidney measures 12.5 x 4.6 x 6 cm Kidneys demonstrate normal cortical echogenicity. No evidence of hydronephrosis or intrarenal stones. 1.7 cm simple appearing cyst identified in the left and right midpole. No hydronephrosis. No renal calculus identified Simple appearing cysts in bilateral kidneys. US DUP LOWER EXTREMITIES BILATERAL VENOUS    Result Date: 10/12/2021  EXAMINATION: DUPLEX VENOUS ULTRASOUND OF THE BILATERAL LOWER [id number]/12/2021 1:45 pm TECHNIQUE: Duplex ultrasound using B-mode/gray scaled imaging, Doppler spectral analysis and color flow Doppler was obtained of the deep venous structures of the lower bilateral extremities. COMPARISON: None.  HISTORY: ORDERING SYSTEM PROVIDED HISTORY: elevated d dimer assess for DVT TECHNOLOGIST PROVIDED HISTORY: Reason for exam:->elevated d dimer assess for DVT What reading provider will be dictating this exam?->CRC FINDINGS: The visualized veins of the bilateral lower extremities are patent and free of echogenic thrombus. The veins demonstrate good compressibility with normal color flow study and spectral analysis. No evidence of DVT in either lower extremity. Cardiac Catheterization: 10/18/2021  SUMMARY OF INJECTIONS:  II.  Selective coronary arteriograms. a. Right coronary artery nondominant, a congenitally small vessel with  multiple areas of 85% stenosis seen proximally in the mid third segment  before the takeoff of the right ventricular branches. The distal vessel  was of smaller luminal caliber, but patent. b.  Left coronary - preponderant. 1.  Left main trunk. 10%-15% distal tapering stenosis. 2.  Left anterior descending what appears to be a high-grade eccentric  stenosis in the origin best visualized in the caudal cranial CONCEPCION  Projection including evidence for moderate calcification followed by a 75%-80% stenosis at the level of the first  septal . Immediately prior to this, there is a takeoff of the  diagonal branch, which has 75%-80% stenosis followed by a second area in  the mid segment of 75%-80% stenosis. The distal vessel was  small-to-moderate, luminal caliber. The distal anterior descending  artery is of moderate luminal caliber and widely patent.     III. Circumflex. a. Dominant vessel giving rise to the posterior descending and  posterior ventricular branches. In the first marginal branch, there was  subtotal occlusion with the distal vessel appearing to have a stenosis  at the junction of the proximal mid third segment. The distal vessel  was grossly under profuse.   In the distal circumflex, there was a  75%-80% stenosis leading into the bifurcation takeoff of the posterior  ventricular and Sylvia Bernal as well as his daughter, son and son-in-law. Cristina Anaya,  FACP,FACC,[place] [facility]      NOTE:  This report was transcribed using voice recognition software.   Every effort was made to ensure accuracy; however, inadvertent computerized transcription errors may be present

## 2021-10-24 NOTE — PROGRESS NOTES
Patient does not wish to be placed on BiPAP at this time.  He is requesting BiPAP placement closer to 0100 but will call with his request

## 2021-10-24 NOTE — PROGRESS NOTES
PROGRESS NOTE       PATIENT PROBLEM LIST:  Active Problems:    Diabetes mellitus type 2, insulin dependent ([facility] [address]    Hyperlipidemia    Acute kidney injury ([facility] [address]    Hyponatremia    Metabolic acidosis    Acute respiratory distress    Multiple vessel coronary artery disease    Clostridioides difficile diarrhea    history of  pancreatitis due to biliary obstruction  Resolved Problems:    * No resolved hospital problems. *      SUBJECTIVE:  Suzi Bahena states he feels significantly improved but is somewhat frustrated that he has not been transferred as of yet for definitive cardiac intervention. He is questioning whether he could go home and follow-up as an outpatient at this point. REVIEW OF SYSTEMS:  General ROS: negative for - fatigue, malaise,  weight gain or weight loss  Psychological ROS: positive for - anxiety and depression  Ophthalmic ROS: negative for - decreased vision or visual distortion. ENT ROS: negative  Allergy and Immunology ROS: negative  Hematological and Lymphatic ROS: negative  Endocrine: no heat or cold intolerance and no polyphagia, polydipsia, or polyuria  Respiratory ROS: positive for - cough and shortness of breath  Cardiovascular ROS: positive for - dyspnea on exertion, irregular heartbeat and shortness of breath. Gastrointestinal ROS: no abdominal pain, change in bowel habits, or black or bloody stools  Genito-Urinary ROS: no nocturia, dysuria, trouble voiding, frequency or hematuria  Musculoskeletal ROS: negative for- myalgias, arthralgias, or claudication  Neurological ROS: no TIA or stroke symptoms otherwise no significant change in symptoms or problems since yesterday as documented in previous progress notes.     SCHEDULED MEDICATIONS:   insulin lispro  0-6 Units SubCUTAneous TID WC    insulin lispro  0-3 Units SubCUTAneous Nightly    apixaban  5 mg Oral BID    guaiFENesin  400 mg Oral TID    sodium chloride flush  5-40 mL IntraVENous 2 times per day    pantoprazole  40 mg Oral QAM AC    amLODIPine  5 mg Oral Daily    levalbuterol  0.63 mg Nebulization TID    Fidaxomicin  200 mg Oral BID    lactobacillus  1 capsule Oral Daily    influenza virus vaccine  0.5 mL IntraMUSCular Prior to discharge    rosuvastatin  10 mg Oral QPM       VITAL SIGNS:                                                                                                                          /63   Pulse 56   Temp 96.2 °F (35.7 °C) (Temporal)   Resp 16   Ht 5' 7\" (1.702 m)   Wt 280 lb (127 kg)   SpO2 98%   BMI 43.85 kg/m²   No data found. OBJECTIVE:    HEENT: PERRL, EOM  Intact; sclera non-icteric, conjunctiva pink. Carotids are brisk in upstroke with normal contour. No carotid bruits. Normal jugular venous pulsation at 45°. No palpable cervical nor supraclavicular nodes. Thyroid not palpable. Trachea midline. Chest: Even excursion  Lungs: CTA B, no expiratory wheezes or rhonchi, no decreased tactile fremitus without inspiratory rales. Heart: Regular  rhythm; S1 > S2, no gallop or murmur. No clicks, rub, palpable thrills   or heaves. PMI nondisplaced, 5th intercostal space MCL. Abdomen: Soft, nontender, nondistended, + + grossly protuberant, no masses or organomegaly. Bowel sounds active.right femoral catheterization site without hematoma  Extremities: Without clubbing, cyanosis or edema. Pulses present 3+ upper extermities bilaterally; present 1+ DP and present 1+ PT bilaterally.      Data:   Scheduled Meds: Reviewed  Continuous Infusions:    sodium chloride      dextrose         Intake/Output Summary (Last 24 hours) at 10/24/2021 0126  Last data filed at 10/23/2021 1814  Gross per 24 hour   Intake 250 ml   Output --   Net 250 ml     CBC:   Recent Labs     10/21/21  0720 10/23/21  0455   WBC 7.7 7.1   HGB 11.2* 11.2*   HCT 36.3* 36.0*    275     BMP:  Recent Labs     10/21/21  0720 10/21/21  0720 10/22/21  0532 10/22/21  0532 10/23/21  0455     --  139  --  139   K 4.2  --  4.0 --  4.3     --  104  --  105   CO2 22  --  21*  --  25   BUN 17  --  18  --  16   CREATININE 1.3*  --  1.3*  --  1.4*   LABGLOM 53   < > 53   < > 49    < > = values in this interval not displayed. ABGs:   Lab Results   Component Value Date    PH 7.410 10/13/2021    PO2 75.6 10/13/2021    PCO2 29.6 10/13/2021     INR: No results for input(s): INR in the last 72 hours. PRO-BNP:   Lab Results   Component Value Date    PROBNP 10,291 (H) 10/16/2021    PROBNP 11,016 (H) 10/13/2021      TSH:   Lab Results   Component Value Date    TSH 3.320 10/18/2021      Cardiac Injury Profile:   Lab Results   Component Value Date    TROPHS 165 (H) 10/12/2021      Lipid Profile:   Lab Results   Component Value Date    TRIG 281 10/13/2021    HDL 11 10/13/2021    LDLCALC 30 10/13/2021    CHOL 97 10/13/2021      Hemoglobin A1C: No components found for: HGBA1C      RAD:   Echo Complete    Result Date: 10/15/2021  Transthoracic Echocardiography Report (TTE)  Demographics   Patient Name    Ena Corona Gender            Male                  A   Medical Record  [id number]     Room Number       8506  Number   Account #       [de-identified]    Procedure Date    10/14/2021   Corporate ID                 Ordering          Jeffrey Russell MD                               Physician   Accession       [id number]   Referring  Number                       Physician   Date of Birth   1940   [address] [facility]   Age             [de-identified] year(s)   Interpreting      [address]                               Physician         Physician Cardiology                                                 Chet Gr MD                                Any Other  Procedure Type of Study   TTE procedure:Echo Complete W/Doppler & Color Flow. Procedure Date Date: 10/14/2021 Start: 03:36 PM Study Location: Portable Technical Quality: Adequate visualization Indications:Congestive heart failure. Patient Status: Routine Contrast Medium: Definity. Height: 67 inches Weight: 280 pounds BSA: 2.33 m^2 BMI: 43.85 kg/m^2 BP: 152/79 mmHg  Findings   Left Ventricle  Moderate asymmetric septal hypertrophy septal wall 1.6cm. Mild global LV hypokineses  Ejection fraction is visually estimated at 40 to 45%. Right Ventricle  Normal right ventricular size and function. Left Atrium  The left atrium is mildly dilated. Right Atrium  Mildly enlarged right atrium size. Mitral Valve  Mild mitral regurgitation is present. Tricuspid Valve  Mild tricuspid regurgitation. Aortic Valve  The aortic valve appears mildly sclerotic. No hemodynamically significant aortic stenosis is present. Pulmonic Valve  The pulmonic valve was not well visualized. Pericardial Effusion  Fat pad present. Conclusions   Summary  Moderate asymmetric septal hypertrophy septal wall 1.6cm. Mild global LV hypokineses  Ejection fraction is visually estimated at 40 to 45%. Normal right ventricular size and function. Mildly enlarged right atrium size. Mild mitral regurgitation is present. The aortic valve appears mildly sclerotic. No hemodynamically significant aortic stenosis is present. Mild tricuspid regurgitation.    Signature   ----------------------------------------------------------------  Electronically signed by Benita Casper MD(Interpreting  physician) on 10/15/2021 12:36 PM  ----------------------------------------------------------------  M-Mode/2D Measurements & Calculations   LV Diastolic    LV Systolic Dimension: 3.9   AV Cusp Separation: 1.3 cmLA  Dimension: 4.9  cm                           Dimension: 4 cmAO Root  cm              LV Volume Diastolic: 079.7   Dimension: 3.6 cm  LV FS:20.4 %    ml  LV PW           LV Volume Systolic: 01.1 ml  Diastolic: 1.3  LV EDV/LV EDV Index: 113.9  cm              ml/49 ml/m^2LV ESV/LV ESV    RV Diastolic Dimension: 2.5  Septum          Index: 67.1 ml/29ml/ m^2     cm  Diastolic: 1.6  EF Calculated: 41.1 %  cm              LV Mass Index: 128 l/min*m^2 Ascending Aorta: 2.7 cm                  LV Length: 8.9 cm            LA volume/Index: 87.8 ml  LV Mass: 297.54                              /37.53ml/m^2  g               LVOT: 2.1 cm                 RA Area: 18.7 cm^2  Doppler Measurements & Calculations   MV Peak E-Wave: 1.12 AV Peak Velocity:     LVOT Peak Velocity: 0.94 m/s  m/s                  1.92 m/s              LVOT Mean Velocity: 0.71 m/s  MV Peak A-Wave: 0.29 AV Peak Gradient:     LVOT Peak Gradient: 3.5  m/s                  14.71 mmHg            mmHgLVOT Mean Gradient: 2.2  MV E/A Ratio: 3.89   AV Mean Velocity:     mmHg  MV Peak Gradient:    1.52 m/s              Estimated RVSP: 15.3 mmHg  4.8 mmHg             AV Mean Gradient: 9.8 Estimated RAP:3 mmHg  MV Mean Gradient:    mmHg  1.6 mmHg             AV VTI: 34.2 cm  MV Mean Velocity:    AV Area               TR Velocity:1.76 m/s  0.57 m/s             (Continuity):1.63     TR Gradient:12.35 mmHg  MV Deceleration      cm^2                  PV Peak Velocity: 1.31 m/s  Time: 178.2 msec                           PV Peak Gradient: 6.83 mmHg  MV P1/2t: 50.8 msec  LVOT VTI: 16.1 cm     PV Mean Velocity: 0.8 m/s  MVA by PHT:4.33 cm^2                       PV Mean Gradient: 2.9 mmHg  MV Area              Estimated PASP: 15.35  (continuity): 2.4    mmHg  cm^2  MV E' Septal  Velocity: 0.05 m/s  MV E' Lateral  Velocity: 10 m/s  [id number]    XR CHEST (2 VW)    Result Date: 10/20/2021  EXAMINATION: TWO XRAY VIEWS OF THE CHEST 10/20/2021 10:28 am COMPARISON: 10/13/2021 HISTORY: ORDERING SYSTEM PROVIDED HISTORY: cough TECHNOLOGIST PROVIDED HISTORY: Reason for exam:->cough What reading provider will be dictating this exam?->CRC FINDINGS: There is a right lung base infiltrate. Right upper lobe and left lung clear. The cardiac silhouette is within normal limits.   There is no pleural effusion. 1. Right lower lobe infiltrate     XR CHEST (2 VW)    Result Date: 10/11/2021  EXAMINATION: TWO XRAY VIEWS OF THE CHEST 10/11/2021 11:41 am COMPARISON: 09/18/2014 HISTORY: ORDERING SYSTEM PROVIDED HISTORY: cp TECHNOLOGIST PROVIDED HISTORY: Reason for exam:->cp What reading provider will be dictating this exam?->CRC FINDINGS: Heart size is normal.  There are no infiltrates or effusions. Normal chest     XR ABDOMEN (KUB) (SINGLE AP VIEW)    Result Date: 10/13/2021  EXAMINATION: ONE SUPINE XRAY VIEW(S) OF THE ABDOMEN 10/13/2021 8:41 pm COMPARISON: None. HISTORY: ORDERING SYSTEM PROVIDED HISTORY: ileus TECHNOLOGIST PROVIDED HISTORY: Reason for exam:->ileus What reading provider will be dictating this exam?->CRC FINDINGS: The bowel gas pattern is nonobstructive. Status post cholecystectomy and posterior body fusion at L5-S1. Nonobstructive bowel gas pattern. No evidence of ileus. NM LUNG VENT/PERFUSION (VQ)    Result Date: 10/12/2021  EXAMINATION: NUCLEAR MEDICINE VENTILATION PERFUSION SCAN. 10/12/2021 TECHNIQUE: 63 millicuries aerosolized Tc99m DTPA was administered via mask prior to planar imaging of the lungs in multiple projections. Then, 7.5 millicuries of Tc 32D MAA was administered intravenously prior to planar imaging of the lungs in similar projections. COMPARISON: Chest radiograph 10/11/2021. HISTORY: ORDERING SYSTEM PROVIDED HISTORY: elevated d dimer exclude PE TECHNOLOGIST PROVIDED HISTORY: Reason for exam:->elevated d dimer exclude PE What reading provider will be dictating this exam?->CRC FINDINGS: PERFUSION: Mildly heterogeneous distribution of radiotracer. No unmatched segmental or subsegmental perfusion defects. VENTILATION: Heterogeneous distribution of radiotracer. There is some central airway deposition as can be seen with obstructive airways disease. CHEST RADIOGRAPH: No focal areas of consolidation or significant effusions on recent chest radiograph.      Very low probability for pulmonary embolism. XR CHEST PORTABLE    Result Date: 10/13/2021  EXAMINATION: ONE XRAY VIEW OF THE CHEST 10/13/2021 2:23 pm COMPARISON: October 11, 2021 HISTORY: ORDERING SYSTEM PROVIDED HISTORY: wheezing TECHNOLOGIST PROVIDED HISTORY: Reason for exam:->wheezing What reading provider will be dictating this exam?->CRC FINDINGS: On today's study the heart silhouette appears to be larger than previous. There also appears to be mild increase in pulmonary vascularity which could suggest mild vascular congestion. Trachea is midline. No effusions are seen. No pneumothorax is identified. Visualized bony thorax shows no acute abnormality. Cardiac silhouette appears somewhat larger although this is a portable image and there also appears to be slight increase in pulmonary vascularity. No other acute findings identified. US RETROPERITONEAL COMPLETE    Result Date: 10/12/2021  EXAMINATION: RETROPERITONEAL ULTRASOUND OF THE KIDNEYS AND URINARY BLADDER 10/12/2021 COMPARISON: None HISTORY: ORDERING SYSTEM PROVIDED HISTORY: acute kidney injury TECHNOLOGIST PROVIDED HISTORY: Reason for exam:->acute kidney injury What reading provider will be dictating this exam?->CRC FINDINGS: Kidneys: The right kidney measures 10.6 x 6.5 x 5.4 cm and the left kidney measures 12.5 x 4.6 x 6 cm Kidneys demonstrate normal cortical echogenicity. No evidence of hydronephrosis or intrarenal stones. 1.7 cm simple appearing cyst identified in the left and right midpole. No hydronephrosis. No renal calculus identified Simple appearing cysts in bilateral kidneys. US DUP LOWER EXTREMITIES BILATERAL VENOUS    Result Date: 10/12/2021  EXAMINATION: DUPLEX VENOUS ULTRASOUND OF THE BILATERAL LOWER [id number]/12/2021 1:45 pm TECHNIQUE: Duplex ultrasound using B-mode/gray scaled imaging, Doppler spectral analysis and color flow Doppler was obtained of the deep venous structures of the lower bilateral extremities. COMPARISON: None. HISTORY: ORDERING SYSTEM PROVIDED HISTORY: elevated d dimer assess for DVT TECHNOLOGIST PROVIDED HISTORY: Reason for exam:->elevated d dimer assess for DVT What reading provider will be dictating this exam?->CRC FINDINGS: The visualized veins of the bilateral lower extremities are patent and free of echogenic thrombus. The veins demonstrate good compressibility with normal color flow study and spectral analysis. No evidence of DVT in either lower extremity. Cardiac Catheterization: 10/18/2021  SUMMARY OF INJECTIONS:  II.  Selective coronary arteriograms. a. Right coronary artery nondominant, a congenitally small vessel with  multiple areas of 85% stenosis seen proximally in the mid third segment  before the takeoff of the right ventricular branches. The distal vessel  was of smaller luminal caliber, but patent. b.  Left coronary - preponderant. 1.  Left main trunk. 10%-15% distal tapering stenosis. 2.  Left anterior descending what appears to be a high-grade eccentric  stenosis in the origin best visualized in the caudal cranial CONCEPCION  Projection including evidence for moderate calcification followed by a 75%-80% stenosis at the level of the first  septal . Immediately prior to this, there is a takeoff of the  diagonal branch, which has 75%-80% stenosis followed by a second area in  the mid segment of 75%-80% stenosis. The distal vessel was  small-to-moderate, luminal caliber. The distal anterior descending  artery is of moderate luminal caliber and widely patent.     III. Circumflex. a. Dominant vessel giving rise to the posterior descending and  posterior ventricular branches. In the first marginal branch, there was  subtotal occlusion with the distal vessel appearing to have a stenosis  at the junction of the proximal mid third segment. The distal vessel  was grossly under profuse.   In the distal circumflex, there was a  75%-80% stenosis leading into the bifurcation takeoff of the posterior  ventricular and posterior descending branches. In the posterior  ventricular branch, there appears to be moderate stenosis as well. III. Injection of left subclavian artery    The left subclavian artery is a large luminal caliber giving rise to a large left internal mammary artery. IV. Left ventricular angiogram.  Left ventricular cavity size is upper  limits of normal with global normal left ventricular systolic function. Estimated left ventricular ejection fraction 55%.     CONCLUSIONS:  1. Severely stenotic native coronary atherosclerosis with coronary  artery calcification. 2.  Well-preserved left ventricular systolic function with abnormal  diastolic dysfunction. NB difficulty was encountered in cannulating and  maintaining the left coronary catheter in the os of the left main  coronary artery in addition to the patient's body habitus. 3.  Widely patent large lumen left subclavian and left internal mammary arteries    EKG: See Report  Echo: See Report      IMPRESSIONS:  Active Problems:    Diabetes mellitus type 2, insulin dependent (HCC)    Hyperlipidemia    Acute kidney injury ([facility] [address]    Hyponatremia    Metabolic acidosis    Acute respiratory distress    Multiple vessel coronary artery disease    Clostridioides difficile diarrhea    history of  pancreatitis due to biliary obstruction  Resolved Problems:    * No resolved hospital problems. *      RECOMMENDATIONS:  Mr. Lenore Cancino feels significantly better since the time of admission and notes that he no longer has any diarrhea is eating well and has not experienced any  chest discomfort over the past 48 hours. we will reach out to the [facility] as well as family members to discuss transfer or discharge with outpatient follow-up.   I have spent more than 25 minutes face to face with Sasha Flaherty and reviewing notes and laboratory data, with greater than 50% of this time instructing and counseling the patient and his family members face to face regarding my findings and recommendations and I have answered all questions as posed to me by Mr. Lenore Cancino as well as his daughter, son and son-in-law. Sylvia Nuñez, DO FACP,FACC,[facility]      NOTE:  This report was transcribed using voice recognition software.   Every effort was made to ensure accuracy; however, inadvertent computerized transcription errors may be present

## 2021-10-24 NOTE — PROGRESS NOTES
Progress Note  10/24/2021 12:29 PM  Subjective:   Admit Date: 10/11/2021  PCP: Gay Sesay MD  Interval History: Patient examined doing well feels ok edema +     Diet: ADULT DIET; Regular    Data:   Scheduled Meds:   insulin lispro  0-6 Units SubCUTAneous TID WC    insulin lispro  0-3 Units SubCUTAneous Nightly    apixaban  5 mg Oral BID    guaiFENesin  400 mg Oral TID    sodium chloride flush  5-40 mL IntraVENous 2 times per day    pantoprazole  40 mg Oral QAM AC    amLODIPine  5 mg Oral Daily    levalbuterol  0.63 mg Nebulization TID    Fidaxomicin  200 mg Oral BID    lactobacillus  1 capsule Oral Daily    influenza virus vaccine  0.5 mL IntraMUSCular Prior to discharge    rosuvastatin  10 mg Oral QPM     Continuous Infusions:   sodium chloride      dextrose       PRN Meds:benzonatate, sodium chloride flush, sodium chloride, guaiFENesin-dextromethorphan, regadenoson, temazepam, racepinephrine HCl, sodium chloride nebulizer, glucose, dextrose, glucagon (rDNA), dextrose, perflutren lipid microspheres, ondansetron **OR** [DISCONTINUED] ondansetron, polyethylene glycol, acetaminophen **OR** acetaminophen  I/O last 3 completed shifts: In: 250 [P.O.:250]  Out: -   I/O this shift: In: 400 [P.O.:400]  Out: -     Intake/Output Summary (Last 24 hours) at 10/24/2021 1229  Last data filed at 10/24/2021 0854  Gross per 24 hour   Intake 650 ml   Output --   Net 650 ml     CBC:   Recent Labs     10/23/21  0455   WBC 7.1   HGB 11.2*        BMP:    Recent Labs     10/22/21  0532 10/23/21  0455 10/24/21  0607    139 137   K 4.0 4.3 3.9    105 103   CO2 21* 25 22   BUN 18 16 15   CREATININE 1.3* 1.4* 1.2   GLUCOSE 115* 111* 103*     Hepatic: No results for input(s): AST, ALT, ALB, BILITOT, ALKPHOS in the last 72 hours. Troponin: No results for input(s): TROPONINI in the last 72 hours. BNP: No results for input(s): BNP in the last 72 hours.   Lipids: No results for input(s): CHOL, HDL in the last 72 hours. Invalid input(s): LDLCALCU  ABGs: No results found for: PHART, PO2ART, CTL4SJV  INR: No results for input(s): INR in the last 72 hours. -----------------------------------------------------------------  RAD: XR CHEST (2 VW)    Result Date: 10/11/2021  EXAMINATION: TWO XRAY VIEWS OF THE CHEST 10/11/2021 11:41 am COMPARISON: 09/18/2014 HISTORY: ORDERING SYSTEM PROVIDED HISTORY: cp TECHNOLOGIST PROVIDED HISTORY: Reason for exam:->cp What reading provider will be dictating this exam?->CRC FINDINGS: Heart size is normal.  There are no infiltrates or effusions. Normal chest     NM LUNG VENT/PERFUSION (VQ)    Result Date: 10/12/2021  EXAMINATION: NUCLEAR MEDICINE VENTILATION PERFUSION SCAN. 10/12/2021 TECHNIQUE: 63 millicuries aerosolized Tc99m DTPA was administered via mask prior to planar imaging of the lungs in multiple projections. Then, 7.5 millicuries of Tc 26K MAA was administered intravenously prior to planar imaging of the lungs in similar projections. COMPARISON: Chest radiograph 10/11/2021. HISTORY: ORDERING SYSTEM PROVIDED HISTORY: elevated d dimer exclude PE TECHNOLOGIST PROVIDED HISTORY: Reason for exam:->elevated d dimer exclude PE What reading provider will be dictating this exam?->CRC FINDINGS: PERFUSION: Mildly heterogeneous distribution of radiotracer. No unmatched segmental or subsegmental perfusion defects. VENTILATION: Heterogeneous distribution of radiotracer. There is some central airway deposition as can be seen with obstructive airways disease. CHEST RADIOGRAPH: No focal areas of consolidation or significant effusions on recent chest radiograph. Very low probability for pulmonary embolism.      US RETROPERITONEAL COMPLETE    Result Date: 10/12/2021  EXAMINATION: RETROPERITONEAL ULTRASOUND OF THE KIDNEYS AND URINARY BLADDER 10/12/2021 COMPARISON: None HISTORY: ORDERING SYSTEM PROVIDED HISTORY: acute kidney injury TECHNOLOGIST PROVIDED HISTORY: Reason for exam:->acute kidney injury What reading provider will be dictating this exam?->CRC FINDINGS: Kidneys: The right kidney measures 10.6 x 6.5 x 5.4 cm and the left kidney measures 12.5 x 4.6 x 6 cm Kidneys demonstrate normal cortical echogenicity. No evidence of hydronephrosis or intrarenal stones. 1.7 cm simple appearing cyst identified in the left and right midpole. No hydronephrosis. No renal calculus identified Simple appearing cysts in bilateral kidneys. US DUP LOWER EXTREMITIES BILATERAL VENOUS    Result Date: 10/12/2021  EXAMINATION: DUPLEX VENOUS ULTRASOUND OF THE BILATERAL LOWER [id number]/12/2021 1:45 pm TECHNIQUE: Duplex ultrasound using B-mode/gray scaled imaging, Doppler spectral analysis and color flow Doppler was obtained of the deep venous structures of the lower bilateral extremities. COMPARISON: None. HISTORY: ORDERING SYSTEM PROVIDED HISTORY: elevated d dimer assess for DVT TECHNOLOGIST PROVIDED HISTORY: Reason for exam:->elevated d dimer assess for DVT What reading provider will be dictating this exam?->CRC FINDINGS: The visualized veins of the bilateral lower extremities are patent and free of echogenic thrombus. The veins demonstrate good compressibility with normal color flow study and spectral analysis. No evidence of DVT in either lower extremity. Objective:   Vitals: /76   Pulse 57   Temp 97.6 °F (36.4 °C) (Temporal)   Resp 16   Ht 5' 7\" (1.702 m)   Wt 280 lb (127 kg)   SpO2 97%   BMI 43.85 kg/m²   General appearance: appears stated age   Skin:  No rashes or lesions  HEENT: Head: Normocephalic, no lesions, without obvious abnormality.   Neck: no adenopathy, no carotid bruit, no JVD, supple, symmetrical, trachea midline and thyroid not enlarged, symmetric, no tenderness/mass/nodules  Lungs: clear to auscultation bilaterally  Heart: regular rate and rhythm, S1, S2 normal, no murmur, click, rub or gallop  Abdomen: soft, non-tender; bowel sounds normal; no masses,  no

## 2021-10-24 NOTE — PROGRESS NOTES
ENDOCRINOLOGY PROGRESS NOTE      Date of admission: 10/11/2021  Date of service: 10/24/2021  Admitting physician: Garry Prasad MD   Primary Care Physician: Garry Prasad MD  Consultant physician: Yaniv Canseco MD     Reason for the consultation:  Uncontrolled DM    History of Present Illness: The history is provided by the patient. Accuracy of the patient data is excellent    Julieth Corrigan is a very pleasant [de-identified] y.o. old male with PMH DM type 2, pancreatitis , HLD, and other listed below admitted to [facility] on 10/11/2021 because of generalized weakness and found to have SAURABH and hyponatremia, endocrine service was consulted for diabetes management.     Subjective:   Seen this AM, no acute issues overnight, BG at goal     Inpatient diet:   Carb Restricted diet     Point of care glucose monitoring   (Independently reviewed)   Recent Labs     10/22/21  2205 10/23/21  0702 10/23/21  1153 10/23/21  1632 10/23/21  1937 10/24/21  0612 10/24/21  1133 10/24/21  1653   GLUMET 116* 102* 130* 136* 142* 109* 137* 128*     Scheduled Meds:   insulin lispro  0-6 Units SubCUTAneous TID WC    insulin lispro  0-3 Units SubCUTAneous Nightly    apixaban  5 mg Oral BID    guaiFENesin  400 mg Oral TID    sodium chloride flush  5-40 mL IntraVENous 2 times per day    pantoprazole  40 mg Oral QAM AC    amLODIPine  5 mg Oral Daily    levalbuterol  0.63 mg Nebulization TID    Fidaxomicin  200 mg Oral BID    lactobacillus  1 capsule Oral Daily    influenza virus vaccine  0.5 mL IntraMUSCular Prior to discharge    rosuvastatin  10 mg Oral QPM       PRN Meds:   benzonatate, 100 mg, TID PRN  sodium chloride flush, 5-40 mL, PRN  sodium chloride, 25 mL, PRN  guaiFENesin-dextromethorphan, 5 mL, Q4H PRN  regadenoson, 0.4 mg, ONCE PRN  temazepam, 15 mg, Nightly PRN  racepinephrine HCl, 11.25 mg, Q4H PRN  sodium chloride nebulizer, 3 mL, Q4H PRN  glucose, 15 g, PRN  dextrose, 12.5 g, PRN  glucagon (rDNA), 1 mg, PRN  dextrose, 100 mL/hr, PRN  perflutren lipid microspheres, 1.5 mL, ONCE PRN  ondansetron, 4 mg, Q8H PRN  polyethylene glycol, 17 g, Daily PRN  acetaminophen, 650 mg, Q6H PRN   Or  acetaminophen, 650 mg, Q6H PRN      Continuous Infusions:   sodium chloride      dextrose         Review of Systems  All systems reviewed. All negative except for symptoms mentioned in HPI     OBJECTIVE    /82   Pulse 56   Temp 97 °F (36.1 °C) (Temporal)   Resp 18   Ht 5' 7\" (1.702 m)   Wt 280 lb (127 kg)   SpO2 98%   BMI 43.85 kg/m²     Intake/Output Summary (Last 24 hours) at 10/24/2021 1818  Last data filed at 10/24/2021 1407  Gross per 24 hour   Intake 620 ml   Output --   Net 620 ml     Physical examination:  General: awake alert, oriented x3  HEENT: normocephalic non traumatic, no exophthalmos   Neck: supple, thyroid tenderness,  Pulm: Clear equal air entry no added sounds  CVS: S1 + S2  Abd: soft lax, no tenderness  Skin: warm, no lesions, no rash.  No open wounds, no ulcers   Neuro: CN intact, sensation decreased bilateral , muscle power normal  Psych: normal mood, and affect    Review of Laboratory Data:  I personally reviewed the following labs:   Recent Labs     10/23/21  0455   WBC 7.1   RBC 4.15   HGB 11.2*   HCT 36.0*   MCV 86.7   MCH 27.0   MCHC 31.1*   RDW 14.5      MPV 10.9     Recent Labs     10/22/21  0532 10/23/21  0455 10/24/21  0607    139 137   K 4.0 4.3 3.9    105 103   CO2 21* 25 22   BUN 18 16 15   CREATININE 1.3* 1.4* 1.2   GLUCOSE 115* 111* 103*   CALCIUM 9.7 9.9 9.5     No results found for: BHYDRXBUT  Lab Results   Component Value Date    LABA1C 6.4 10/12/2021    LABA1C 7.7 09/24/2014     Lab Results   Component Value Date/Time    TSH 3.320 10/18/2021 01:43 PM     Lab Results   Component Value Date    LABA1C 6.4 10/12/2021    GLUCOSE 103 10/24/2021    MALBCR 28.4 09/24/2014    LABMICR 27.8 09/24/2014    LABCREA 249 10/13/2021     Lab Results   Component Value Date    TRIG 281 10/13/2021    HDL 11 10/13/2021    LDLCALC 30 10/13/2021    CHOL 97 10/13/2021       Blood culture   Lab Results   Component Value Date    BC 5 Days- no growth 09/25/2014       Radiology:  XR CHEST (2 VW)   Final Result   1. Right lower lobe infiltrate         XR ABDOMEN (KUB) (SINGLE AP VIEW)   Final Result   Nonobstructive bowel gas pattern. No evidence of ileus. XR CHEST PORTABLE   Final Result   Cardiac silhouette appears somewhat larger although this is a portable image   and there also appears to be slight increase in pulmonary vascularity. No   other acute findings identified. US DUP LOWER EXTREMITIES BILATERAL VENOUS   Final Result   No evidence of DVT in either lower extremity. NM LUNG VENT/PERFUSION (VQ)   Final Result   Very low probability for pulmonary embolism. US RETROPERITONEAL COMPLETE   Final Result   No hydronephrosis. No renal calculus identified      Simple appearing cysts in bilateral kidneys. XR CHEST (2 VW)   Final Result   Normal chest             Medical Records/Labs/Images review:   I personally reviewed and summarized previous records   All labs and imaging were reviewed independently     [address] [facility], a [de-identified] y.o.-old male seen today for inpatient diabetes management     Diabetes Mellitus type 2   · Currently under good control   · We recommend the following diabetes regimen   · Low dose sliding scale with meals and at night   · Continue glucose check with meals and at bedtime   · Will titrate insulin dose based on the blood glucose trend & insulin requirement    SAURABH    Improving, Management per primary and nephrology    Patient is at risk for hypoglycemia while receiving insulin due to SAURABH    Continue to monitor blood glucose closely    Hyponatremia   · Improved     Evaluation for hypogonadism   · This will be better than as an outpatient   · Pt will follow with us few weeks after discharge.  Endocrine follow up visit, Thursday 11/11 at 12:45PM The above issues were reviewed with the patient who understood and agreed with the plan. Thank you for allowing us to participate in the care of this patient. Please do not hesitate to contact us with any additional questions. Yaniv Canseco MD  Endocrinologist, [facility] Diabetes Care and Endocrinology   [address] [place] [address]   Phone: [phone]  Fax: [phone]  --------------------------------------------  An electronic signature was used to authenticate this note.  Caryn Patel MD on 10/24/2021 at 6:18 PM

## 2021-10-24 NOTE — PROGRESS NOTES
Admit Date: 10/11/2021       Subjective:  Chief Complaint   Patient presents with    Fatigue     fatigue x1 week    Extremity Weakness     weakness x1week            Objective:    Room was extrememly cold yesterday and now it is rather warm  He is getting frustrated on being in hospital as he waits for transfer to [place]    Bun/cr 16/1.4  About the same (admission 31/2.9)  bnp 7283,  10,000    a1c 6.4    cxr  RLL infiltrate 10/20    +2940 since admission        Scheduled Meds:  Current Facility-Administered Medications   Medication Dose Route Frequency Provider Last Rate Last Admin    insulin lispro (HUMALOG) injection vial 0-6 Units  0-6 Units SubCUTAneous TID WC Hina Blackwell MD   1 Units at 10/22/21 1219    insulin lispro (HUMALOG) injection vial 0-3 Units  0-3 Units SubCUTAneous Nightly Hina Blackwell MD   1 Units at 10/23/21 2115    benzonatate (TESSALON) capsule 100 mg  100 mg Oral TID PRN Brenda Kidder, DO        apixaban (ELIQUIS) tablet 5 mg  5 mg Oral BID Bobbette Sands, DO   5 mg at 10/24/21 8740    guaiFENesin tablet 400 mg  400 mg Oral TID Gabriela Trav Toolson, DO   400 mg at 10/24/21 0856    sodium chloride flush 0.9 % injection 5-40 mL  5-40 mL IntraVENous 2 times per day Bobbette Sands, DO   10 mL at 10/24/21 0856    sodium chloride flush 0.9 % injection 5-40 mL  5-40 mL IntraVENous PRN Bobbette Sands, DO        0.9 % sodium chloride infusion  25 mL IntraVENous PRN Bobbette Sands, DO        pantoprazole (PROTONIX) tablet 40 mg  40 mg Oral QAM AC Andres Blocker, APRN - CNP   40 mg at 10/24/21 0609    amLODIPine (NORVASC) tablet 5 mg  5 mg Oral Daily Mariela Pantelis, APRN - CNP   5 mg at 10/24/21 0856    levalbuterol (XOPENEX) nebulization 0.63 mg  0.63 mg Nebulization TID Venessa Ortiz MD   0.63 mg at 10/24/21 0854    guaiFENesin-dextromethorphan (ROBITUSSIN DM) 100-10 MG/5ML syrup 5 mL  5 mL Oral Q4H PRN MD Nicole Downs [facility]) injection 0.4 mg 0.4 mg IntraVENous ONCE PRN Mariela Pantriks APRSUMIT - CNP        Fidaxomicin (DIFICID) tablet 200 mg  200 mg Oral BID Darcie Nixon MD   200 mg at 10/24/21 0856    temazepam (RESTORIL) capsule 15 mg  15 mg Oral Nightly PRN Pat León MD   15 mg at 10/16/21 2356    lactobacillus (CULTURELLE) capsule 1 capsule  1 capsule Oral Daily Carolynne Sever, MD   1 capsule at 10/24/21 0856    racepinephrine HCl (VAPONEFPRIN) 2.25 % nebulizer solution NEBU 11.25 mg  11.25 mg Nebulization Q4H PRN Carolynne Sever, MD        sodium chloride nebulizer 0.9 % solution 3 mL  3 mL Nebulization Q4H PRN Carolynne Sever, MD        glucose (GLUTOSE) 40 % oral gel 15 g  15 g Oral PRN Pat León MD        dextrose 50 % IV solution  12.5 g IntraVENous PRN Pat León MD        glucagon (rDNA) injection 1 mg  1 mg IntraMUSCular PRN Pat León MD        dextrose 5 % solution  100 mL/hr IntraVENous PRN Pat León MD        perflutren lipid microspheres (DEFINITY) injection 1.65 mg  1.5 mL IntraVENous ONCE PRN Carolynne Sever, MD        influenza quadrivalent split vaccine (FLUZONE;FLUARIX;FLULAVAL;AFLURIA) injection 0.5 mL  0.5 mL IntraMUSCular Prior to discharge Pat León MD        rosuvastatin (CRESTOR) tablet 10 mg  10 mg Oral QPM Pat León MD   10 mg at 10/23/21 1804    ondansetron (ZOFRAN-ODT) disintegrating tablet 4 mg  4 mg Oral Q8H PRN Pat León MD        polyethylene glycol [facility]) packet 17 g  17 g Oral Daily PRN Pat León MD        acetaminophen (TYLENOL) tablet 650 mg  650 mg Oral Q6H PRN Pat León MD        Or   Larance Sharif acetaminophen (TYLENOL) suppository 650 mg  650 mg Rectal Q6H PRN Pat León MD           Continuous Infusions  :   sodium chloride      dextrose         PRN Meds:  benzonatate, sodium chloride flush, sodium chloride, guaiFENesin-dextromethorphan, regadenoson, temazepam, racepinephrine HCl, sodium chloride nebulizer, glucose, dextrose, glucagon (rDNA), dextrose, perflutren lipid microspheres, ondansetron **OR** [DISCONTINUED] ondansetron, polyethylene glycol, acetaminophen **OR** acetaminophen      Wt Readings from Last 3 Encounters:   10/11/21 280 lb (127 kg)   09/18/14 265 lb (120.2 kg)   08/28/14 265 lb 8 oz (120.4 kg)     I/O last 3 completed shifts: In: 250 [P.O.:250]  Out: -     Intake/Output Summary (Last 24 hours) at 10/24/2021 0901  Last data filed at 10/23/2021 1814  Gross per 24 hour   Intake 250 ml   Output --   Net 250 ml       Vitals:    10/24/21 0854   BP:    Pulse:    Resp: 16   Temp:    SpO2: 97%       Physical Exam:  Awake and alert   Complaints of being could as abouve h  Heart reg lungs clear      CBC  Recent Labs     10/23/21  0455   WBC 7.1   HGB 11.2*   HCT 36.0*   MCV 86.7        BMP  Recent Labs     10/22/21  0532 10/23/21  0455 10/24/21  0607    139 137   K 4.0 4.3 3.9    105 103   CO2 21* 25 22   BUN 18 16 15   CREATININE 1.3* 1.4* 1.2   LABGLOM 53 49 58   CALCIUM 9.7 9.9 9.5     LFT's  No results for input(s): ALT in the last 72 hours. Invalid input(s):  AST,  ALKPHOS,  BILITOT,  BILIDIR  INR: No results for input(s): INR in the last 72 hours. No results found for: CRP  No results found for: SEDRATE  No results for input(s): POCGLU in the last 72 hours. Lipids: No results for input(s): CHOL, HDL in the last 72 hours.     Invalid input(s): LDLCALCU  ABGs: No results found for: PHART, PO2ART, MRY6VCD  SpO2 Readings from Last 1 Encounters:   10/24/21 97%       Last 3 Troponin:  No results found for: TROPONININo results found for: CKTOTAL, CKMB, CKMBINDEX, TROPONINI     TSH:    Lab Results   Component Value Date    TSH 3.320 10/18/2021      Vent Information  Equipment Changed: Mask  SpO2: 97 %  Mask Type: Nasal pillows      U/A:     Lab Results   Component Value Date    COLORU Yellow 10/12/2021    PHUR 5.5 10/12/2021    WBCUA NONE 10/12/2021    RBCUA 10-20 10/12/2021    BACTERIA FEW 10/12/2021    CLARITYU SL CLOUDY 10/12/2021    SPECGRAV >=1.030 10/12/2021    LEUKOCYTESUR Negative 10/12/2021    UROBILINOGEN 0.2 10/12/2021    BILIRUBINUR SMALL 10/12/2021    BLOODU MODERATE 10/12/2021    GLUCOSEU Negative 10/12/2021          Iron studies:  Lab Results   Component Value Date    FERRITIN 389 10/18/2021     Bone disease:  Lab Results   Component Value Date    PTH 42 09/25/2014    MG 2.0 10/20/2021    PHOS 3.4 10/20/2021     Nutrition:No results found for: ALB           Assessment:  Patient Active Problem List   Diagnosis Code    HTN (hypertension) I10    Diabetes mellitus type 2, insulin dependent ([facility] E11.9, Z79.4    Hyperlipidemia E78.5    Renal insufficiency N28.9    Osteoarthritis, knee M17.10    Acute kidney injury ([facility] N17.9    Hyponatremia K41.3    Metabolic acidosis M49.8    Acute respiratory distress R06.03    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 CAD   Cardiomyopathy with EF 40% -EF improved on catheterization   [facility] [place]  Severely stenotic native coronary atherosclerosis with coronary  artery calcification   RCA 85% stenosis mid 3rd segment   LAD hig grade stenosis at origin origin   Circ  75-08% stenosis leading into the bifurcation takeoff of the posterior  ventricular and posterior descending branches    2.  catherine on ckd    3. Severe OMARI    4. Morbid obesity    5. c diff treated    6.  afib    7.  htn    8.   Dm 2 controlled a1c 6.4       Plan:    Waiting for transfer to [facility]    Boo Rod MD M.D.    10/24/2021

## 2021-10-24 NOTE — PROGRESS NOTES
ENDOCRINOLOGY PROGRESS NOTE      Date of admission: 10/11/2021  Date of service: 10/23/2021  Admitting physician: Essence Valencia MD   Primary Care Physician: Essence Valencia MD  Consultant physician: Maura Oliva MD     Reason for the consultation:  Uncontrolled DM    History of Present Illness: The history is provided by the patient. Accuracy of the patient data is excellent    Ole Devi is a very pleasant [de-identified] y.o. old male with PMH DM type 2, pancreatitis , HLD, and other listed below admitted to [facility] on 10/11/2021 because of generalized weakness and found to have SAURABH and hyponatremia, endocrine service was consulted for diabetes management. Subjective:   Patient was seen and examined at bedside this AM. Patient is awaek, alert and oriented x3, he is having breakfast. Patient denies acute complains today. Blood glucose is controlled, range from 124 - 169 on sliding scale. Creatinine stable at 1.3.      Inpatient diet:   Carb Restricted diet     Point of care glucose monitoring   (Independently reviewed)   Recent Labs     10/22/21  0601 10/22/21  1132 10/22/21  1720 10/22/21  2205 10/23/21  0702 10/23/21  1153 10/23/21  1632 10/23/21  1937   GLUMET 124* 169* 105* 116* 102* 130* 136* 142*     Scheduled Meds:   insulin lispro  0-6 Units SubCUTAneous TID WC    insulin lispro  0-3 Units SubCUTAneous Nightly    apixaban  5 mg Oral BID    guaiFENesin  400 mg Oral TID    sodium chloride flush  5-40 mL IntraVENous 2 times per day    pantoprazole  40 mg Oral QAM AC    amLODIPine  5 mg Oral Daily    levalbuterol  0.63 mg Nebulization TID    Fidaxomicin  200 mg Oral BID    lactobacillus  1 capsule Oral Daily    influenza virus vaccine  0.5 mL IntraMUSCular Prior to discharge    rosuvastatin  10 mg Oral QPM       PRN Meds:   benzonatate, 100 mg, TID PRN  sodium chloride flush, 5-40 mL, PRN  sodium chloride, 25 mL, PRN  guaiFENesin-dextromethorphan, 5 mL, Q4H PRN  regadenoson, 0.4 mg, ONCE PRN  temazepam, 15 mg, Nightly PRN  racepinephrine HCl, 11.25 mg, Q4H PRN  sodium chloride nebulizer, 3 mL, Q4H PRN  glucose, 15 g, PRN  dextrose, 12.5 g, PRN  glucagon (rDNA), 1 mg, PRN  dextrose, 100 mL/hr, PRN  perflutren lipid microspheres, 1.5 mL, ONCE PRN  ondansetron, 4 mg, Q8H PRN  polyethylene glycol, 17 g, Daily PRN  acetaminophen, 650 mg, Q6H PRN   Or  acetaminophen, 650 mg, Q6H PRN      Continuous Infusions:   sodium chloride      dextrose         Review of Systems  All systems reviewed. All negative except for symptoms mentioned in HPI     OBJECTIVE    /63   Pulse 56   Temp 96.2 °F (35.7 °C) (Temporal)   Resp 16   Ht 5' 7\" (1.702 m)   Wt 280 lb (127 kg)   SpO2 98%   BMI 43.85 kg/m²     Intake/Output Summary (Last 24 hours) at 10/23/2021 2044  Last data filed at 10/23/2021 1814  Gross per 24 hour   Intake 250 ml   Output --   Net 250 ml     Physical examination:  General: awake alert, oriented x3  HEENT: normocephalic non traumatic, no exophthalmos   Neck: supple, thyroid tenderness,  Pulm: Clear equal air entry no added sounds  CVS: S1 + S2  Abd: soft lax, no tenderness  Skin: warm, no lesions, no rash.  No open wounds, no ulcers   Neuro: CN intact, sensation decreased bilateral , muscle power normal  Psych: normal mood, and affect    Review of Laboratory Data:  I personally reviewed the following labs:   Recent Labs     10/21/21  0720 10/23/21  0455   WBC 7.7 7.1   RBC 4.27 4.15   HGB 11.2* 11.2*   HCT 36.3* 36.0*   MCV 85.0 86.7   MCH 26.2 27.0   MCHC 30.9* 31.1*   RDW 14.6 14.5    275   MPV 10.8 10.9     Recent Labs     10/21/21  0720 10/22/21  0532 10/23/21  0455    139 139   K 4.2 4.0 4.3    104 105   CO2 22 21* 25   BUN 17 18 16   CREATININE 1.3* 1.3* 1.4*   GLUCOSE 112* 115* 111*   CALCIUM 9.6 9.7 9.9     No results found for: BHYDRXBUT  Lab Results   Component Value Date    LABA1C 6.4 10/12/2021    LABA1C 7.7 09/24/2014     Lab Results   Component Value Date/Time    TSH 3.320 10/18/2021 01:43 PM     Lab Results   Component Value Date    LABA1C 6.4 10/12/2021    GLUCOSE 111 10/23/2021    MALBCR 28.4 09/24/2014    LABMICR 27.8 09/24/2014    LABCREA 249 10/13/2021     Lab Results   Component Value Date    TRIG 281 10/13/2021    HDL 11 10/13/2021    LDLCALC 30 10/13/2021    CHOL 97 10/13/2021       Blood culture   Lab Results   Component Value Date    BC 5 Days- no growth 09/25/2014       Radiology:  XR CHEST (2 VW)   Final Result   1. Right lower lobe infiltrate         XR ABDOMEN (KUB) (SINGLE AP VIEW)   Final Result   Nonobstructive bowel gas pattern. No evidence of ileus. XR CHEST PORTABLE   Final Result   Cardiac silhouette appears somewhat larger although this is a portable image   and there also appears to be slight increase in pulmonary vascularity. No   other acute findings identified. US DUP LOWER EXTREMITIES BILATERAL VENOUS   Final Result   No evidence of DVT in either lower extremity. NM LUNG VENT/PERFUSION (VQ)   Final Result   Very low probability for pulmonary embolism. US RETROPERITONEAL COMPLETE   Final Result   No hydronephrosis. No renal calculus identified      Simple appearing cysts in bilateral kidneys.          XR CHEST (2 VW)   Final Result   Normal chest             Medical Records/Labs/Images review:   I personally reviewed and summarized previous records   All labs and imaging were reviewed independently     [address] [facility], a [de-identified] y.o.-old male seen today for inpatient diabetes management     Diabetes Mellitus type 2   · Currently require much less insulin due to SAURABH   · We recommend the following diabetes regimen   · Low dose sliding scale with meals and at night   · Continue glucose check with meals and at bedtime   · Will titrate insulin dose based on the blood glucose trend & insulin requirement    SAURABH    Management per primary and nephrology    Patient is at risk for hypoglycemia while receiving insulin due to SAURABH    Continue to monitor blood glucose closely    Hyponatremia   · Improved     Evaluation for hypogonadism   · This will be better than as an outpatient   · Pt will follow with us few weeks after discharge. Endocrine follow up visit, Thursday 11/11 at 12:45PM      The above issues were reviewed with the patient who understood and agreed with the plan. Thank you for allowing us to participate in the care of this patient. Please do not hesitate to contact us with any additional questions. Xuan Rice MD  Endocrinologist, [facility] Endocrinology   [address] [place] [id number]   Phone: [phone]  Fax: [phone]  --------------------------------------------  An electronic signature was used to authenticate this note.  Alcon Fraga MD on 10/23/2021 at 8:44 PM

## 2021-10-25 VITALS
HEIGHT: 67 IN | WEIGHT: 280 LBS | TEMPERATURE: 97 F | SYSTOLIC BLOOD PRESSURE: 140 MMHG | DIASTOLIC BLOOD PRESSURE: 63 MMHG | OXYGEN SATURATION: 96 % | RESPIRATION RATE: 17 BRPM | BODY MASS INDEX: 43.95 KG/M2 | HEART RATE: 63 BPM

## 2021-10-25 PROBLEM — E87.20 METABOLIC ACIDOSIS: Status: RESOLVED | Noted: 2021-10-14 | Resolved: 2021-10-25

## 2021-10-25 PROBLEM — A04.72 CLOSTRIDIOIDES DIFFICILE DIARRHEA: Status: RESOLVED | Noted: 2021-10-15 | Resolved: 2021-10-25

## 2021-10-25 PROBLEM — R06.03 ACUTE RESPIRATORY DISTRESS: Status: RESOLVED | Noted: 2021-10-14 | Resolved: 2021-10-25

## 2021-10-25 PROBLEM — E87.1 HYPONATREMIA: Status: RESOLVED | Noted: 2021-10-14 | Resolved: 2021-10-25

## 2021-10-25 PROBLEM — N17.9 ACUTE KIDNEY INJURY (HCC): Status: RESOLVED | Noted: 2021-10-11 | Resolved: 2021-10-25

## 2021-10-25 PROBLEM — K83.1 PANCREATITIS DUE TO BILIARY OBSTRUCTION: Status: RESOLVED | Noted: 2021-10-18 | Resolved: 2021-10-25

## 2021-10-25 PROBLEM — K85.90 PANCREATITIS DUE TO BILIARY OBSTRUCTION: Status: RESOLVED | Noted: 2021-10-18 | Resolved: 2021-10-25

## 2021-10-25 LAB
ANION GAP SERPL CALCULATED.3IONS-SCNC: 12 MMOL/L (ref 7–16)
BUN BLDV-MCNC: 16 MG/DL (ref 6–23)
CALCIUM SERPL-MCNC: 10.2 MG/DL (ref 8.6–10.2)
CHLORIDE BLD-SCNC: 103 MMOL/L (ref 98–107)
CO2: 21 MMOL/L (ref 22–29)
CREAT SERPL-MCNC: 1.3 MG/DL (ref 0.7–1.2)
GFR AFRICAN AMERICAN: >60
GFR NON-AFRICAN AMERICAN: 53 ML/MIN/1.73
GLUCOSE BLD-MCNC: 117 MG/DL (ref 74–99)
HCT VFR BLD CALC: 38.8 % (ref 37–54)
HEMOGLOBIN: 12 G/DL (ref 12.5–16.5)
MCH RBC QN AUTO: 26.6 PG (ref 26–35)
MCHC RBC AUTO-ENTMCNC: 30.9 % (ref 32–34.5)
MCV RBC AUTO: 86 FL (ref 80–99.9)
METER GLUCOSE: 128 MG/DL (ref 74–99)
PDW BLD-RTO: 14.2 FL (ref 11.5–15)
PLATELET # BLD: 244 E9/L (ref 130–450)
PMV BLD AUTO: 11.1 FL (ref 7–12)
POTASSIUM SERPL-SCNC: 3.9 MMOL/L (ref 3.5–5)
RBC # BLD: 4.51 E12/L (ref 3.8–5.8)
SODIUM BLD-SCNC: 136 MMOL/L (ref 132–146)
WBC # BLD: 6.4 E9/L (ref 4.5–11.5)

## 2021-10-25 PROCEDURE — 6370000000 HC RX 637 (ALT 250 FOR IP): Performed by: INTERNAL MEDICINE

## 2021-10-25 PROCEDURE — 94660 CPAP INITIATION&MGMT: CPT

## 2021-10-25 PROCEDURE — 85027 COMPLETE CBC AUTOMATED: CPT

## 2021-10-25 PROCEDURE — 36415 COLL VENOUS BLD VENIPUNCTURE: CPT

## 2021-10-25 PROCEDURE — 82962 GLUCOSE BLOOD TEST: CPT

## 2021-10-25 PROCEDURE — 6370000000 HC RX 637 (ALT 250 FOR IP): Performed by: NURSE PRACTITIONER

## 2021-10-25 PROCEDURE — 6360000002 HC RX W HCPCS: Performed by: INTERNAL MEDICINE

## 2021-10-25 PROCEDURE — 94640 AIRWAY INHALATION TREATMENT: CPT

## 2021-10-25 PROCEDURE — 80048 BASIC METABOLIC PNL TOTAL CA: CPT

## 2021-10-25 RX ORDER — ONDANSETRON 4 MG/1
4 TABLET, ORALLY DISINTEGRATING ORAL EVERY 8 HOURS PRN
DISCHARGE
Start: 2021-10-25

## 2021-10-25 RX ORDER — LACTOBACILLUS RHAMNOSUS GG 10B CELL
1 CAPSULE ORAL DAILY
DISCHARGE
Start: 2021-10-25

## 2021-10-25 RX ORDER — PANTOPRAZOLE SODIUM 40 MG/1
40 TABLET, DELAYED RELEASE ORAL
Qty: 30 TABLET | Refills: 3 | DISCHARGE
Start: 2021-10-26

## 2021-10-25 RX ORDER — TEMAZEPAM 15 MG/1
15 CAPSULE ORAL NIGHTLY PRN
Status: SHIPPED | DISCHARGE
Start: 2021-10-25 | End: 2021-11-08

## 2021-10-25 RX ORDER — AMLODIPINE BESYLATE 5 MG/1
5 TABLET ORAL DAILY
Qty: 30 TABLET | Refills: 3 | DISCHARGE
Start: 2021-10-25

## 2021-10-25 RX ADMIN — GUAIFENESIN 400 MG: 400 TABLET ORAL at 08:51

## 2021-10-25 RX ADMIN — FIDAXOMICIN 200 MG: 200 TABLET, FILM COATED ORAL at 08:51

## 2021-10-25 RX ADMIN — AMLODIPINE BESYLATE 5 MG: 5 TABLET ORAL at 08:52

## 2021-10-25 RX ADMIN — APIXABAN 5 MG: 5 TABLET, FILM COATED ORAL at 08:51

## 2021-10-25 RX ADMIN — Medication 1 CAPSULE: at 08:51

## 2021-10-25 RX ADMIN — LEVALBUTEROL HYDROCHLORIDE 0.63 MG: 0.63 SOLUTION RESPIRATORY (INHALATION) at 08:31

## 2021-10-25 RX ADMIN — PANTOPRAZOLE SODIUM 40 MG: 40 TABLET, DELAYED RELEASE ORAL at 06:02

## 2021-10-25 ASSESSMENT — PAIN SCALES - GENERAL: PAINLEVEL_OUTOF10: 0

## 2021-10-25 NOTE — PROGRESS NOTES
Nephrology Progress Note  10/25/2021 8:14 AM  Subjective:   Admit Date: 10/11/2021  PCP: Mamta Donaldson MD  Interval History:   10/25/21- seen up in chair. Awaiting transfer to [facility]    Diet: ADULT DIET; Regular    Data:   Scheduled Meds:   insulin lispro  0-6 Units SubCUTAneous TID WC    insulin lispro  0-3 Units SubCUTAneous Nightly    apixaban  5 mg Oral BID    guaiFENesin  400 mg Oral TID    sodium chloride flush  5-40 mL IntraVENous 2 times per day    pantoprazole  40 mg Oral QAM AC    amLODIPine  5 mg Oral Daily    levalbuterol  0.63 mg Nebulization TID    Fidaxomicin  200 mg Oral BID    lactobacillus  1 capsule Oral Daily    influenza virus vaccine  0.5 mL IntraMUSCular Prior to discharge    rosuvastatin  10 mg Oral QPM     Continuous Infusions:   sodium chloride      dextrose       PRN Meds:benzonatate, sodium chloride flush, sodium chloride, guaiFENesin-dextromethorphan, regadenoson, temazepam, racepinephrine HCl, sodium chloride nebulizer, glucose, dextrose, glucagon (rDNA), dextrose, perflutren lipid microspheres, ondansetron **OR** [DISCONTINUED] ondansetron, polyethylene glycol, acetaminophen **OR** acetaminophen  I/O last 3 completed shifts: In: 840 [P.O.:840]  Out: -   No intake/output data recorded. Intake/Output Summary (Last 24 hours) at 10/25/2021 0814  Last data filed at 10/24/2021 1856  Gross per 24 hour   Intake 840 ml   Output --   Net 840 ml     CBC:   Recent Labs     10/23/21  0455 10/25/21  0625   WBC 7.1 6.4   HGB 11.2* 12.0*    244     BMP:    Recent Labs     10/23/21  0455 10/24/21  0607 10/25/21  0625    137 136   K 4.3 3.9 3.9    103 103   CO2 25 22 21*   BUN 16 15 16   CREATININE 1.4* 1.2 1.3*   GLUCOSE 111* 103* 117*     Hepatic: No results for input(s): AST, ALT, ALB, BILITOT, ALKPHOS in the last 72 hours. Troponin: No results for input(s): TROPONINI in the last 72 hours. BNP: No results for input(s): BNP in the last 72 hours.   Lipids: No results for input(s): CHOL, HDL in the last 72 hours. Invalid input(s): LDLCALCU  ABGs: No results found for: PHART, PO2ART, EDM1YNI  INR: No results for input(s): INR in the last 72 hours. -----------------------------------------------------------------  RAD: XR CHEST (2 VW)    Result Date: 10/11/2021  EXAMINATION: TWO XRAY VIEWS OF THE CHEST 10/11/2021 11:41 am COMPARISON: 09/18/2014 HISTORY: ORDERING SYSTEM PROVIDED HISTORY: cp TECHNOLOGIST PROVIDED HISTORY: Reason for exam:->cp What reading provider will be dictating this exam?->CRC FINDINGS: Heart size is normal.  There are no infiltrates or effusions. Normal chest     NM LUNG VENT/PERFUSION (VQ)    Result Date: 10/12/2021  EXAMINATION: NUCLEAR MEDICINE VENTILATION PERFUSION SCAN. 10/12/2021 TECHNIQUE: 63 millicuries aerosolized Tc99m DTPA was administered via mask prior to planar imaging of the lungs in multiple projections. Then, 7.5 millicuries of Tc 35Y MAA was administered intravenously prior to planar imaging of the lungs in similar projections. COMPARISON: Chest radiograph 10/11/2021. HISTORY: ORDERING SYSTEM PROVIDED HISTORY: elevated d dimer exclude PE TECHNOLOGIST PROVIDED HISTORY: Reason for exam:->elevated d dimer exclude PE What reading provider will be dictating this exam?->CRC FINDINGS: PERFUSION: Mildly heterogeneous distribution of radiotracer. No unmatched segmental or subsegmental perfusion defects. VENTILATION: Heterogeneous distribution of radiotracer. There is some central airway deposition as can be seen with obstructive airways disease. CHEST RADIOGRAPH: No focal areas of consolidation or significant effusions on recent chest radiograph. Very low probability for pulmonary embolism.      US RETROPERITONEAL COMPLETE    Result Date: 10/12/2021  EXAMINATION: RETROPERITONEAL ULTRASOUND OF THE KIDNEYS AND URINARY BLADDER 10/12/2021 COMPARISON: None HISTORY: ORDERING SYSTEM PROVIDED HISTORY: acute kidney injury TECHNOLOGIST PROVIDED sounds normal; no masses,  no organomegaly  Extremities: edema ++  Neurologic: Mental status: Alert, oriented, thought content appropriate    Assessment:   Patient Active Problem List:     HTN (hypertension)     Diabetes mellitus type 2, insulin dependent ([facility] [address]     Hyperlipidemia     Renal insufficiency     Osteoarthritis, knee     Acute kidney injury ([facility] [address]     Hyponatremia     Metabolic acidosis     Acute respiratory distress     Multiple vessel coronary artery disease     Clostridioides difficile diarrhea     history of  pancreatitis due to biliary obstruction    Plan:     IMPRESSION/RECOMMENDATIONS:          1. Hyponatremia resolved   Na 136    2. SAURABH  Baseline from 2014 of 1.5  Cr now 2.9>2.6.->2.0-->1.6-->1.2->1.3  under baseline  No hydro on shirley  Holding cozaar  Sp bactrim        3. Shortness of breath-  Pulmonology following also  Cardiology following  S/p cardiac cath and is to have CABG  Awaiting transfer to [facility]  Had loop yesterday     4. Hypertension-  Controlled at goal<130/80  Off  cozaar  On norvasc     5. Metabolic acidosis  In setting of ckd  Stable  off IVF  Resolved    For transfer to [facility] today  [address], APRN - CNP     Patient seen and examined all key components of the physical performed independently , case discussed with NP, all pertinent labs and radiologic tests personally reviewed agree with above.       Enolia Cheadle, MD

## 2021-10-25 NOTE — PLAN OF CARE
Problem: Falls - Risk of:  Goal: Will remain free from falls  Description: Will remain free from falls  Outcome: Met This Shift     Problem: Falls - Risk of:  Goal: Absence of physical injury  Description: Absence of physical injury  Outcome: Met This Shift     Problem: Musculor/Skeletal Functional Status  Goal: Highest potential functional level  Outcome: Met This Shift     Problem: Musculor/Skeletal Functional Status  Goal: Absence of falls  Outcome: Met This Shift     Problem: Pain:  Goal: Pain level will decrease  Description: Pain level will decrease  Outcome: Met This Shift

## 2021-10-25 NOTE — PROGRESS NOTES
[facility] called with a bed assignment, building [address], bed 16. Transport set up for 10am with physician's ambulance. Nurse to nurse report called to [phone].

## 2021-10-25 NOTE — PROGRESS NOTES
Wound Care: Order for patient for tubi- to bilateral lower extremities, after patient legs measured by nursing, size E tubigrip provided for nursing to apply.  Coleen Conde RN

## 2021-10-25 NOTE — DISCHARGE INSTR - COC
Continuity of Care Form    Patient Name: Murray Harvey   :  1940  MRN:  [id number]    Admit date:  10/11/2021  Discharge date:  10/25    Code Status Order: Full Code   Advance Directives:     Admitting Physician:  Marimar Gray MD  PCP: Marimar Gray MD    Discharging Nurse: [facility] Unit/Room#: 2548/8202-K  Discharging Unit Phone Number: [phone]    Emergency Contact:   Extended Emergency Contact Information  Primary Emergency Contact: Lenny Drummond  Address: [address] [place] [address] Phone: [phone]  Mobile Phone: [phone]  Relation: Spouse  Secondary Emergency Contact: Javier Fuller  Address: [address] [place] [address] Phone: [phone]  Relation: Child    Past Surgical History:  Past Surgical History:   Procedure Laterality Date    BELPHAROPTOSIS REPAIR Bilateral 2014    CATARACT REMOVAL WITH IMPLANT Right 10/2012    CATARACT REMOVAL WITH IMPLANT Left 2012    CHEST SURGERY  1985    benign lumpectomy    CHOLECYSTECTOMY      KNEE SURGERY Left     meniscus repair    KNEE SURGERY Right 1992    meniscus repair   [facility] SPINE SURGERY  2007    lower spine fx, disc repair, [facility].  Dr. Maximino Paredes       Immunization History:   Immunization History   Administered Date(s) Administered    COVID-19, Valdivia Peter, PF, 30mcg/0.3mL 2021, 2021       Active Problems:  Patient Active Problem List   Diagnosis Code    HTN (hypertension) I10    Diabetes mellitus type 2, insulin dependent ([facility] [address] E11.9, Z79.4    Hyperlipidemia E78.5    Renal insufficiency N28.9    Osteoarthritis, knee M17.10    Multiple vessel coronary artery disease I25.10       Isolation/Infection:   Isolation          C Diff Contact        Patient Infection Status     Infection Onset Added Last Indicated Last Indicated By Review Planned Expiration Resolved Resolved By    C-diff (Clostridium difficile) 10/13/21 10/14/21 10/13/21 C. difficile toxin Molecular 10/21/21       Resolved    C-diff Rule Out 10/20/21 10/20/21 10/22/21 CLOSTRIDIUM DIFFICILE EIA (Ordered)   10/25/21 Nancy Roche RN    ORDER WAS CANCELLED 10/23/2021 12:18, Cancelled:  NONLIQUID stool is unacceptable for C Difficile testing    C-diff Rule Out 10/13/21 10/13/21 10/13/21 C. difficile toxin Molecular (Ordered)   10/14/21 Rule-Out Test Resulted    COVID-19 Rule Out 10/11/21 10/11/21 10/11/21 Respiratory Panel, Molecular, with COVID-19 (Restricted: peds pts or suitable admitted adults) (Ordered)   10/20/21 Nancy Roche RN    Duplicate order    CVJTS-84 Rule Out 10/11/21 10/11/21 10/11/21 Respiratory Panel, Molecular, with COVID-19 (Restricted: peds pts or suitable admitted adults) (Ordered)   10/11/21 Rule-Out Test Resulted    COVID-19 Rule Out 10/11/21 10/11/21 10/11/21 COVID-19, Rapid (Ordered)   10/11/21 Rule-Out Test Resulted          Nurse Assessment:  Last Vital Signs: BP (!) 140/63   Pulse 63   Temp 97 °F (36.1 °C) (Temporal)   Resp 17   Ht 5' 7\" (1.702 m)   Wt 280 lb (127 kg)   SpO2 96%   BMI 43.85 kg/m²     Last documented pain score (0-10 scale): Pain Level: 0  Last Weight:   Wt Readings from Last 1 Encounters:   10/11/21 280 lb (127 kg)     Mental Status:  oriented and alert    IV Access:  { MAGGIE IV ACCESS:595518103}    Nursing Mobility/ADLs:  Walking   Assisted  Transfer  Assisted  Bathing  Assisted  Dressing  Independent  [address]  Independent  Med Delivery   whole    Wound Care Documentation and Therapy:  Incision 09/23/14 Knee Right (Active)   Number of days: 2588        Elimination:  Continence:   · Bowel:  Yes  · Bladder: Yes  Urinary Catheter: None   Colostomy/Ileostomy/Ileal Conduit: No       Date of Last BM: ***    Intake/Output Summary (Last 24 hours) at 10/25/2021 0943  Last data filed at 10/24/2021 1856  Gross per 24 hour   Intake 440 ml   Output -- Net 440 ml     I/O last 3 completed shifts: In: 5 [P.O.:840]  Out: -     Safety Concerns: At Risk for Falls    Impairments/Disabilities:      None    Nutrition Therapy:  Current Nutrition Therapy:   - Oral Diet:  General, carb control    Routes of Feeding: Oral  Liquids: Thin Liquids  Daily Fluid Restriction: no  Last Modified Barium Swallow with Video (Video Swallowing Test): not done    Treatments at the Time of Hospital Discharge:   Respiratory Treatments: ***  Oxygen Therapy:  is not on home oxygen therapy. Ventilator:    - No ventilator support    Rehab Therapies: Physical Therapy and Occupational Therapy  Weight Bearing Status/Restrictions: No weight bearing restirctions  Other Medical Equipment (for information only, NOT a DME order):  walker  Other Treatments: ***    Patient's personal belongings (please select all that are sent with patient):  None    RN SIGNATURE:  Electronically signed by Rico Castaneda RN on 10/25/21 at 9:48 AM EDT    CASE MANAGEMENT/SOCIAL WORK SECTION    Inpatient Status Date: ***    Readmission Risk Assessment Score:  Readmission Risk              Risk of Unplanned Readmission:  16           Discharging to Facility/ Agency   · Name:   · Address:  · Phone:  · Fax:    Dialysis Facility (if applicable)   · Name:  · Address:  · Dialysis Schedule:  · Phone:  · Fax:    / signature: {Esignature:[id number]}    PHYSICIAN SECTION    Prognosis: {Prognosis:9532304497}    Condition at Discharge: [facility] Patient Condition:397512972}    Rehab Potential (if transferring to Rehab): {Prognosis:5422265037}    Recommended Labs or Other Treatments After Discharge: ***    Physician Certification: I certify the above information and transfer of Karol Joyce  is necessary for the continuing treatment of the diagnosis listed and that he requires {Admit to Appropriate Level of Care:81445} for {GREATER/LESS:592964949} 30 days.      Update Admission H&P: {CHP DME Changes in AVQXU:732694462}    PHYSICIAN SIGNATURE:  {Esignature:[id number]}

## 2021-10-27 LAB
EKG ATRIAL RATE: 267 BPM
EKG Q-T INTERVAL: 470 MS
EKG QRS DURATION: 76 MS
EKG QTC CALCULATION (BAZETT): 449 MS
EKG R AXIS: 15 DEGREES
EKG T AXIS: -85 DEGREES
EKG VENTRICULAR RATE: 55 BPM

## 2021-10-28 ENCOUNTER — CARE COORDINATION (OUTPATIENT)
Dept: CARE COORDINATION | Age: 81
End: 2021-10-28

## 2021-10-28 NOTE — CARE COORDINATION
[facility] Transitions Follow Up Call    10/28/2021    Patient: Matthieu Blank  Patient : 1940   MRN: <[id number]>  Reason for Admission:   Discharge Date: 10/25/21 RARS: Readmission Risk Score: 8.4       Patient was D/C home today 10/28/2021. Care Transitions Subsequent and Final Call    Subsequent and Final Calls  Care Transitions Interventions  Other Interventions:            Follow Up  Future Appointments   Date Time Provider Edda Hyde   2021 12:45 PM Sandro Aparicio MD [facility]       Shayla Carroll

## 2021-10-29 ENCOUNTER — CARE COORDINATION (OUTPATIENT)
Dept: CASE MANAGEMENT | Age: 81
End: 2021-10-29

## 2021-10-29 RX ORDER — LISINOPRIL 10 MG/1
10 TABLET ORAL DAILY
COMMUNITY

## 2021-10-29 RX ORDER — FENOFIBRATE 160 MG/1
160 TABLET ORAL DAILY
COMMUNITY

## 2021-10-29 RX ORDER — ERGOCALCIFEROL (VITAMIN D2) 1250 MCG
50000 CAPSULE ORAL WEEKLY
COMMUNITY

## 2021-10-29 RX ORDER — TAMSULOSIN HYDROCHLORIDE 0.4 MG/1
0.4 CAPSULE ORAL DAILY
COMMUNITY

## 2021-10-29 RX ORDER — INSULIN DETEMIR 100 [IU]/ML
5 INJECTION, SOLUTION SUBCUTANEOUS NIGHTLY
COMMUNITY

## 2021-10-29 RX ORDER — NITROGLYCERIN 0.4 MG/1
0.4 TABLET SUBLINGUAL EVERY 5 MIN PRN
COMMUNITY

## 2021-10-29 RX ORDER — ACETAMINOPHEN 500 MG
1000 TABLET ORAL EVERY 8 HOURS PRN
COMMUNITY

## 2021-10-29 RX ORDER — FUROSEMIDE 40 MG/1
40 TABLET ORAL DAILY
COMMUNITY

## 2021-10-29 RX ORDER — ASPIRIN 81 MG/1
81 TABLET ORAL DAILY
COMMUNITY

## 2021-10-29 NOTE — CARE COORDINATION
[address] [facility] Transitions Follow Up Call    10/29/2021    Patient: Lyndon Jimenez  Patient : 1940   MRN: [id number]  Reason for Admission: SAURABH discharged from [facility] ([facility] on 10/25/21, transferred to [facility] on 10/25/21 unstable angina  Discharge Date: 10/25/21 RARS: Readmission Risk Score: 8.4    Transitions of Care Initial Call      Challenges to be reviewed by the provider   Additional needs identified to be addressed with provider: No  none             Method of communication with provider : none      Advance Care Planning:   Does patient have an Advance Directive: decision maker reviewed and current. Was this a readmission? No  Patients top risk factors for readmission: medical condition-CAD, SAURABH, DM, multiple health system providers and polypharmacy    Care Transition Nurse (CTN) contacted the patient by telephone to perform post hospital discharge assessment. Provided introduction to self, and explanation of the CTN role. CTN reviewed discharge instructions, medical action plan and red flags with patient who verbalized understanding. Patient given an opportunity to ask questions and does not have any further questions or concerns at this time. Were discharge instructions available to patient? Yes, from [facility]. Reviewed appropriate site of care based on symptoms and resources available to patient including: PCP and Specialist. The patient agrees to contact the PCP office for questions related to their healthcare. Medication reconciliation was performed with patient, who verbalizes understanding of administration of home medications. [facility]. Patient no longer taking humalog, lactobacillus, zofran, restoril, or protonix. New medications upon discharge from [facility] are levemir, baby aspirin, vitamin d, lasix, lisinopril, ntg sl, flomax, tricor, and tylenol ES. Medications updated in EMR.       Care Transitions Subsequent and Final Call Subsequent and Final Calls  Do you have any questions related to your medications?: No  Do you currently have any active services?: No  Do you have any needs or concerns that I can assist you with?: No  Identified Barriers: None  Care Transitions Interventions  No Identified Needs  Other Interventions:         -Spoke with the patient for  BPCI care transition call post hospital discharge(transferred to [facility] from [facility] ([facility] on 10/25/21 and returned home on 10/28/21.)  Explained the role of Care Transition Nurse and the BPCI-A program, patient is agreeable to follow up post discharge from the hospital.  -Patient happy to be home, reports some weakness and decreased energy, but is ambulatory, up and about, showered today. No reports of SOB. -Patient states he will be calling all his providers on Monday 11/1/21 for scheduling of appointments(PCP, cardiology-Kinjal, endocrinology-Ji, janeth-Ever if needed though left shoulder discomfort is lessened.  -Patient states he will not be attending below endocrinology appointment as Dr Howard Smith is his endocrinologist.  -Patient denies any needs, questions, or concerns at this time and is agreeable to continued follow up from CTN. -CTN attempted to call [place] [phone] Endocrinology to notify patient will not be attending below visit-office closed for the day. CTN will route to front office staff to inform.            Follow Up  Future Appointments   Date Time Provider Edda Hyde   11/11/2021 12:45 PM Gail Cockayne, MD [facility]       Jennifer Schmitt RN

## 2021-11-05 ENCOUNTER — CARE COORDINATION (OUTPATIENT)
Dept: CASE MANAGEMENT | Age: 81
End: 2021-11-05

## 2021-11-05 NOTE — CARE COORDINATION
[address] [facility] Transitions Follow Up Call    2021    Patient: Lashell Montano  Patient : 1940   MRN: [id number]  Reason for Admission: SAURABH discharged from [facility] (1[facility] on 10/25/21, transferred to [facility] on 10/25/21 unstable angina  Discharge Date: 10/25/21 RARS: Readmission Risk Score: 8.4    -Attempt made to reach the patient for subsequent BPCI call. Message left with CTN's contact information requesting return phone call.    -Patient returned CTN call and left message. -CTN able to speak with patient. Care Transitions Subsequent and Final Call    Subsequent and Final Calls  Do you have any questions related to your medications?: No  Do you currently have any active services?: No  Do you have any needs or concerns that I can assist you with?: No  Identified Barriers: None  Care Transitions Interventions  No Identified Needs  Other Interventions:         -Spoke with patient for follow up BPCI-A call.   -Patient remains with decreased energy but is not worsened.  -Patient states he had an appointment today with PCP. Patient is awaiting call back from cardiology(Kinjal)for scheduling of appointment. Patient states he set up an appointment with Dr Curtis(ortho)for 21 regarding his left shoulder discomfort though it is improving. Patient states he still needs to call Dr Workman(endocrinology) and plans to schedule with his urologist Dr Dustin Cannon for follow up as well given he was taken off his oxybutynin when he was discharged from [facility]. Patient reports a blood sugar of 117 today.  -Patient denies any needs, questions, or concerns at this time and is agreeable to continued follow up from CTN.     Follow Up  Future Appointments   Date Time Provider Edda Hyde   2021  9:00 PM [facility] BEDROOM 5 [facility]   2021 12:45 PM Dwayne William MD [facility] ENDO [facility]       Sarah Horn RN

## 2021-11-08 ENCOUNTER — HOSPITAL ENCOUNTER (OUTPATIENT)
Dept: SLEEP CENTER | Age: 81
Discharge: HOME | End: 2021-11-08
Payer: MEDICARE

## 2021-11-08 VITALS
WEIGHT: 280 LBS | OXYGEN SATURATION: 97 % | DIASTOLIC BLOOD PRESSURE: 90 MMHG | BODY MASS INDEX: 45 KG/M2 | HEIGHT: 66 IN | SYSTOLIC BLOOD PRESSURE: 143 MMHG | HEART RATE: 71 BPM

## 2021-11-08 DIAGNOSIS — G47.33 OSA (OBSTRUCTIVE SLEEP APNEA): ICD-10-CM

## 2021-11-08 PROCEDURE — 95811 POLYSOM 6/>YRS CPAP 4/> PARM: CPT

## 2021-11-08 ASSESSMENT — SLEEP AND FATIGUE QUESTIONNAIRES
HOW LIKELY ARE YOU TO NOD OFF OR FALL ASLEEP IN A CAR, WHILE STOPPED FOR A FEW MINUTES IN TRAFFIC: 0
HOW LIKELY ARE YOU TO NOD OFF OR FALL ASLEEP WHILE SITTING QUIETLY AFTER LUNCH WITHOUT ALCOHOL: 0
HOW LIKELY ARE YOU TO NOD OFF OR FALL ASLEEP WHILE WATCHING TV: 3
HOW LIKELY ARE YOU TO NOD OFF OR FALL ASLEEP WHEN YOU ARE A PASSENGER IN A CAR FOR AN HOUR WITHOUT A BREAK: 0
HOW LIKELY ARE YOU TO NOD OFF OR FALL ASLEEP WHILE SITTING AND TALKING TO SOMEONE: 0
ESS TOTAL SCORE: 5
HOW LIKELY ARE YOU TO NOD OFF OR FALL ASLEEP WHILE SITTING AND READING: 2
HOW LIKELY ARE YOU TO NOD OFF OR FALL ASLEEP WHILE SITTING INACTIVE IN A PUBLIC PLACE: 0
HOW LIKELY ARE YOU TO NOD OFF OR FALL ASLEEP WHILE LYING DOWN TO REST IN THE AFTERNOON WHEN CIRCUMSTANCES PERMIT: 0

## 2021-11-11 NOTE — PROGRESS NOTES
[address] [address]                               SLEEP STUDY REPORT    PATIENT NAME: Tevin Lyles                     :        1940  MED REC NO:   [id number]                            ROOM:  ACCOUNT NO:   [de-identified]                           ADMIT DATE: 2021  PROVIDER:     Reza Maria MD    DATE OF STUDY:  2021    This is a CPAP titration. INDICATION FOR TESTING:  The patient with known sleep apnea. The  patient had a bedside multichannel study performed on 10/28/2021 with an  AHI of 30.6 and oxygen desaturations. PAST MEDICAL HISTORY:  Significant for coronary artery disease, atrial  fibrillation, cardiomyopathy, hypertension, chronic kidney disease,  diabetes, obesity, and OMARI. The patient has a BMI of 45.2 and an Keystone Sleepiness score 6/24 with  a neck circumference of 19 inches. FINDINGS:  As follows:  SLEEP ARCHITECTURE:  Total recording time was 360.4 minutes. Total  sleep time was 243 minutes. Sleep efficacy was 67.4%. Sleep latency  was 7.2 minutes. SLEEP STAGES:  The patient was awake for 110.2 minutes. Spent 11.5 in  N1, 68.1 in N2, 5.1 in N3, and 15.2 in REM sleep. RESPIRATORY EVENTS:  During the study, the patient had 19 apneas, which  of those 17 were central and 2 were mixed. The longest duration of the  apneic event was 32.5 seconds. HYPOPNEAS:  During the study, the patient had 59 hypopneas. The longest  duration of the hypopneic event was 34.1 seconds. The apnea index was  4.7, the hypopnea index was 14.6. The apnea/hypopnea index was 19.3. AROUSALS:  During the study, the patient had total of 47 arousals and  awakenings, which of those 31 were spontaneous and 10 were associated  with respiratory events. The arousal  index was 11.6.     LIMB MOVEMENTS:  During the study, the patient had total of 66 limb  movements, which of those only 3

## 2021-11-17 ENCOUNTER — CARE COORDINATION (OUTPATIENT)
Dept: CASE MANAGEMENT | Age: 81
End: 2021-11-17

## 2021-11-17 NOTE — CARE COORDINATION
[address] [facility] Transitions Follow Up Call    2021    Patient: Matthieu Blank  Patient : 1940   MRN: [id number]  Reason for Admission: SAURABH discharged from [facility] (1-) on 10/25/21, transferred to [facility] on 10/25/21 unstable angina  Discharge Date: 10/25/21 RARS: Readmission Risk Score: 8.4    -Attempt made to reach the patient for subsequent BPCI call. Message left with CTN's contact information requesting return phone call. Follow Up  No future appointments.     Aida Moses RN

## 2021-11-23 NOTE — DISCHARGE SUMMARY
Admit Date: 10/11/2021  7:11 PM   Discharge Date: 10/25/2021    Patient Active Problem List   Diagnosis    HTN (hypertension)    Diabetes mellitus type 2, insulin dependent ([facility] [address]    Hyperlipidemia    Renal insufficiency    Osteoarthritis, knee    Multiple vessel coronary artery disease        Present on Admission:   (Resolved) Acute kidney injury ([facility] [address]   (Resolved) Hyponatremia   (Resolved) Metabolic acidosis   (Resolved) Acute respiratory distress   Multiple vessel coronary artery disease   Diabetes mellitus type 2, insulin dependent (HCC)   Hyperlipidemia   (Resolved) Clostridioides difficile diarrhea   (Resolved) history of  pancreatitis due to biliary obstruction       @DISCHARGEMEDSLIST(<NOROUTINE> error)@     Hospital Course/Procedures:[de-identified]year-old initially admitted on 10/11/2021 when increasing fatigue and shortness of breath. He was found to have an elevation of his creatinine with evidence of acidosis. He complained of a cough. Also complained of diarrhea. He recently been treated for UTI as an outpatient with [facility]. Patient was admitted to telemetry. Respiratory viral panel and COVID studies were negative. Chest x-ray was negative. VQ scan was negative for PE. Ultrasound of the legs were negative. Renal ultrasound was negative. Stool studies were positive for C. Difficile. Nephrology was consulted for his acute renal failure and acidosis. Patient was placed on IV fluids and a bicarbonate drip with improvement. Barbara Rooney was treated with Fidaxomicin 200 mg twice a day for 10 days with resolution of his C. Difficile. Also on admission he had an elevated troponin and BNP. Cardiology was consulted. 2-D echo showed ejection fraction of 40-45% with mild global hypokinesis. Cardiac catheterization showed multivessel coronary disease. Also during his stay he developed atrial fibrillation. He was placed on Eliquis.   Plans were also to schedule outpatient evaluation for obstructive sleep apnea. Once again diarrhea resolved and renal function improved as did his acidosis. Due to the findings of multi vessel coronary disease patient was [facility] to [facility] in [facility] condition on 10/25/2021.     Consultants Following:cardiology, pulmonology, nephrology    [facility] in stable condition    Follow-up:he will be seen by the physicians at the [facility]      Gomez Abreu MD  11/23/2021  5:44 PM

## 2021-11-26 ENCOUNTER — CARE COORDINATION (OUTPATIENT)
Dept: CASE MANAGEMENT | Age: 81
End: 2021-11-26

## 2021-11-26 NOTE — CARE COORDINATION
[facility] [facility] Transitions Follow Up Call    2021    Patient: Sheila Guzman  Patient : 1940   MRN: [id number]  Reason for Admission: SAURABH discharged from [facility] (1-) on 10/25/21, transferred to [facility] on 10/25/21 unstable angina  Discharge Date: 10/25/21 RARS: Readmission Risk Score: 8.4      -Attempt made to reach the patient for subsequent BPCI call. Message left with CTN's contact information requesting return phone call.             Follow Up  No future appointments.     Ayleen Guerrero RN

## 2021-12-10 ENCOUNTER — CARE COORDINATION (OUTPATIENT)
Dept: CASE MANAGEMENT | Age: 81
End: 2021-12-10

## 2021-12-21 ENCOUNTER — CARE COORDINATION (OUTPATIENT)
Dept: CASE MANAGEMENT | Age: 81
End: 2021-12-21

## 2021-12-21 NOTE — CARE COORDINATION
[address]02 [facility] Transitions Follow Up Call    2021    Patient: Harley Kruger  Patient : 1940   MRN: [id number]  Reason for Admission: SAURABH discharged from [facility] (1-) on 10/25/21, transferred to [facility] on 10/25/21 unstable angina    Discharge Date: 10/25/21 RARS: Readmission Risk Score: 8.4      Care Transitions Subsequent and Final Call    Subsequent and Final Calls  Do you have any questions related to your medications?: No  Do you currently have any active services?: No  Do you have any needs or concerns that I can assist you with?: No  Identified Barriers: None  Care Transitions Interventions  No Identified Needs  Other Interventions:         -Spoke with patient for follow up BPCI-A call.   -Patient reports he is \"doing fairly\" and has had slight improvement in his energy level since our last conversation.  -Upcoming appointments with cardiology middle of 2022 and endocrinology in 2022.  -Patient denies any needs, questions, or concerns at this time and is agreeable to continued follow up from CTN. Follow Up  No future appointments.     Dori Contreras RN

## 2022-01-04 ENCOUNTER — CARE COORDINATION (OUTPATIENT)
Dept: CASE MANAGEMENT | Age: 82
End: 2022-01-04

## 2022-01-04 NOTE — CARE COORDINATION
[address]02 [facility] Transitions Follow Up Call    2022    Patient: Mesfin Bal  Patient : 1940   MRN: [id number]  Reason for Admission: SAURABH discharged from [address] [facility] [address] on 10/25/21, transferred to [facility] on 10/25/21 unstable angina    Discharge Date: 10/25/21 RARS: Readmission Risk Score: 8.4        Care Transitions Subsequent and Final Call    Subsequent and Final Calls  Do you have any questions related to your medications?: No  Do you currently have any active services?: No  Do you have any needs or concerns that I can assist you with?: No  Identified Barriers: None  Care Transitions Interventions  No Identified Needs  Other Interventions:         -Spoke with patient for follow up BPCI-A call.   -Patient states he is \"doing ok,\" verbalizes no issues.  -Patient states he has followed up with his PCP x 2 and appointment with cardiology is 2/3/22.  -Patient denies any needs, questions, or concerns at this time and is agreeable to a final call from CTN next outreach. Follow Up  No future appointments.     Josafat House RN

## 2022-01-18 ENCOUNTER — CARE COORDINATION (OUTPATIENT)
Dept: CASE MANAGEMENT | Age: 82
End: 2022-01-18

## 2022-01-18 NOTE — CARE COORDINATION
1302 [facility] Transitions Follow Up Call    2022    Patient: Karol Joyce  Patient : 1940   MRN: [id number]  Reason for Admission: SAURABH discharged from [facility] (1-) on 10/25/21, transferred to [facility] on 10/25/21 unstable angina    Discharge Date: 10/25/21 RARS: Readmission Risk Score: 8.4        Care Transitions Subsequent and Final Call    Subsequent and Final Calls  Do you have any ongoing symptoms?: No  Do you have any questions related to your medications?: No  Do you currently have any active services?: No  Do you have any needs or concerns that I can assist you with?: No  Identified Barriers: None  Care Transitions Interventions  No Identified Needs  Other Interventions:         -Spoke with patient for final BPCI call.   -Patient voicing no issues, concerns, or needs. Maintaining consistent follow up with providers. -CTN to sign off as BPCI episode is ending. Follow Up  No future appointments.     Duane Ports, RN

## 2022-12-30 ENCOUNTER — OFFICE VISIT (OUTPATIENT)
Dept: FAMILY MEDICINE CLINIC | Age: 82
End: 2022-12-30
Payer: MEDICARE

## 2022-12-30 VITALS
DIASTOLIC BLOOD PRESSURE: 86 MMHG | HEIGHT: 66 IN | TEMPERATURE: 99.8 F | OXYGEN SATURATION: 96 % | RESPIRATION RATE: 18 BRPM | HEART RATE: 73 BPM | SYSTOLIC BLOOD PRESSURE: 134 MMHG | WEIGHT: 262 LBS | BODY MASS INDEX: 42.11 KG/M2

## 2022-12-30 DIAGNOSIS — U07.1 COVID-19 VIRUS INFECTION: ICD-10-CM

## 2022-12-30 DIAGNOSIS — R05.9 COUGH, UNSPECIFIED TYPE: Primary | ICD-10-CM

## 2022-12-30 LAB
INFLUENZA A ANTIBODY: NORMAL
INFLUENZA B ANTIBODY: NORMAL
Lab: ABNORMAL
PERFORMING INSTRUMENT: ABNORMAL
QC PASS/FAIL: ABNORMAL
SARS-COV-2, POC: DETECTED

## 2022-12-30 PROCEDURE — 3075F SYST BP GE 130 - 139MM HG: CPT | Performed by: FAMILY MEDICINE

## 2022-12-30 PROCEDURE — G8484 FLU IMMUNIZE NO ADMIN: HCPCS | Performed by: FAMILY MEDICINE

## 2022-12-30 PROCEDURE — 3079F DIAST BP 80-89 MM HG: CPT | Performed by: FAMILY MEDICINE

## 2022-12-30 PROCEDURE — 87804 INFLUENZA ASSAY W/OPTIC: CPT | Performed by: FAMILY MEDICINE

## 2022-12-30 PROCEDURE — G8427 DOCREV CUR MEDS BY ELIG CLIN: HCPCS | Performed by: FAMILY MEDICINE

## 2022-12-30 PROCEDURE — 99213 OFFICE O/P EST LOW 20 MIN: CPT | Performed by: FAMILY MEDICINE

## 2022-12-30 PROCEDURE — 1123F ACP DISCUSS/DSCN MKR DOCD: CPT | Performed by: FAMILY MEDICINE

## 2022-12-30 PROCEDURE — 87426 SARSCOV CORONAVIRUS AG IA: CPT | Performed by: FAMILY MEDICINE

## 2022-12-30 PROCEDURE — 1036F TOBACCO NON-USER: CPT | Performed by: FAMILY MEDICINE

## 2022-12-30 PROCEDURE — G8417 CALC BMI ABV UP PARAM F/U: HCPCS | Performed by: FAMILY MEDICINE

## 2022-12-30 RX ORDER — BENZONATATE 100 MG/1
100 CAPSULE ORAL 3 TIMES DAILY PRN
Qty: 30 CAPSULE | Refills: 0 | Status: SHIPPED | OUTPATIENT
Start: 2022-12-30 | End: 2023-01-06

## 2022-12-30 ASSESSMENT — ENCOUNTER SYMPTOMS
FACIAL SWELLING: 0
CHEST TIGHTNESS: 0
SINUS PRESSURE: 0
WHEEZING: 0
COLOR CHANGE: 0
NAUSEA: 0
APNEA: 0
PHOTOPHOBIA: 0
ABDOMINAL PAIN: 0
SHORTNESS OF BREATH: 0
ALLERGIC/IMMUNOLOGIC NEGATIVE: 1
BLOOD IN STOOL: 0
BACK PAIN: 0
COUGH: 1
RHINORRHEA: 1
SORE THROAT: 0
DIARRHEA: 0
VOMITING: 0

## 2022-12-30 NOTE — PROGRESS NOTES
Chief Complaint:   Chief Complaint   Patient presents with    Cough     For last few days. Fever    Congestion    Headache       URI   This is a new problem. The current episode started in the past 7 days. The problem has been gradually worsening. The maximum temperature recorded prior to his arrival was 101 - 101.9 F. Associated symptoms include congestion, coughing and rhinorrhea. Pertinent negatives include no abdominal pain, chest pain, diarrhea, headaches, nausea, rash, sore throat, vomiting or wheezing. He has tried nothing for the symptoms. Patient Active Problem List   Diagnosis    HTN (hypertension)    Diabetes mellitus type 2, insulin dependent ([facility] [address]    Hyperlipidemia    Renal insufficiency    Osteoarthritis, knee    Multiple vessel coronary artery disease       Past Medical History:   Diagnosis Date    Acute pancreatitis     Diabetes mellitus ([facility] [address]     History of snoring     HTN (hypertension)     Hyperlipidemia     Osteoarthritis     Preoperative clearance 8/29/2014    cardiac- Dr. Stephanie Leiva; note in epic for surgery 9/23/2014    Preoperative clearance 9-19-14    medical clearance for OR 9-23-14 from Dr. Jenna Palm on paper chart    Prostate cancer [facility]) 1999    radiation, seed implant    Renal insufficiency        Past Surgical History:   Procedure Laterality Date    BELPHAROPTOSIS REPAIR Bilateral 01/23/2014    CATARACT REMOVAL WITH IMPLANT Right 10/2012    CATARACT REMOVAL WITH IMPLANT Left 11/2012    CHEST SURGERY  5/1985    benign lumpectomy    CHOLECYSTECTOMY  1995    KNEE SURGERY Left 1995    meniscus repair    KNEE SURGERY Right 03/1992    meniscus repair    SPINE SURGERY  2007    lower spine fx, disc repair, [facility].  Dr. Nancy Fajardo       Current Outpatient Medications   Medication Sig Dispense Refill    Insulin Aspart (NOVOLOG FLEXPEN SC) Inject into the skin      Nutritional Supplements (VITAMIN D BOOSTER PO) Take by mouth      benzonatate (TESSALON) 100 MG capsule Take 1 capsule by mouth 3 times daily as needed for Cough 30 capsule 0    insulin detemir (LEVEMIR FLEXTOUCH) 100 UNIT/ML injection pen Inject 5 Units into the skin nightly      aspirin 81 MG EC tablet Take 81 mg by mouth daily      ergocalciferol (ERGOCALCIFEROL) 1.25 MG (50549 UT) capsule Take 50,000 Units by mouth once a week On       furosemide (LASIX) 40 MG tablet Take 40 mg by mouth daily      lisinopril (PRINIVIL;ZESTRIL) 10 MG tablet Take 10 mg by mouth daily      nitroGLYCERIN (NITROSTAT) 0.4 MG SL tablet Place 0.4 mg under the tongue every 5 minutes as needed for Chest pain up to max of 3 total doses. If no relief after 1 dose, call 911. fenofibrate (TRIGLIDE) 160 MG tablet Take 160 mg by mouth daily Tricor      acetaminophen (TYLENOL) 500 MG tablet Take 1,000 mg by mouth every 8 hours as needed      apixaban (ELIQUIS) 5 MG TABS tablet Take 1 tablet by mouth 2 times daily 60 tablet     amLODIPine (NORVASC) 5 MG tablet Take 1 tablet by mouth daily 30 tablet 3    atorvastatin (LIPITOR) 20 MG tablet Take 20 mg by mouth daily       No current facility-administered medications for this visit. No Known Allergies    Social History     Socioeconomic History    Marital status:      Spouse name: None    Number of children: None    Years of education: None    Highest education level: None   Tobacco Use    Smoking status: Never    Smokeless tobacco: Never   Substance and Sexual Activity    Alcohol use: Yes     Comment: occasional    Drug use: No       Family History   Problem Relation Age of Onset    Heart Disease Father          age 72         Review of Systems   Constitutional: Negative. HENT:  Positive for congestion and rhinorrhea. Negative for facial swelling, hearing loss, nosebleeds, sinus pressure and sore throat. Eyes:  Negative for photophobia and visual disturbance. Respiratory:  Positive for cough. Negative for apnea, chest tightness, shortness of breath and wheezing. Cardiovascular:  Negative for chest pain, palpitations and leg swelling. Gastrointestinal:  Negative for abdominal pain, blood in stool, diarrhea, nausea and vomiting. Genitourinary:  Negative for difficulty urinating, frequency and urgency. Musculoskeletal:  Negative for arthralgias, back pain, joint swelling and myalgias. Skin:  Negative for color change and rash. Allergic/Immunologic: Negative. Neurological:  Negative for syncope, weakness, light-headedness and headaches. Hematological: Negative. Psychiatric/Behavioral:  Negative for agitation, behavioral problems, confusion and self-injury. The patient is not nervous/anxious. All other systems reviewed and are negative. Physical Exam  Vitals and nursing note reviewed. Constitutional:       General: He is not in acute distress. Appearance: He is well-developed. HENT:      Head: Normocephalic and atraumatic. Nose: Congestion and rhinorrhea present. Eyes:      Conjunctiva/sclera: Conjunctivae normal.      Pupils: Pupils are equal, round, and reactive to light. Neck:      Thyroid: No thyromegaly. Vascular: No JVD. Cardiovascular:      Rate and Rhythm: Normal rate and regular rhythm. Heart sounds: Normal heart sounds. No murmur heard. No friction rub. No gallop. Pulmonary:      Effort: Pulmonary effort is normal. No respiratory distress. Breath sounds: Rhonchi present. No wheezing. Abdominal:      General: Bowel sounds are normal. There is no distension. Palpations: Abdomen is soft. Tenderness: There is no abdominal tenderness. There is no guarding or rebound. Musculoskeletal:         General: Normal range of motion. Cervical back: Normal range of motion and neck supple. Lymphadenopathy:      Cervical: No cervical adenopathy. Skin:     General: Skin is warm and dry. Findings: No erythema or rash.    Neurological:      Mental Status: He is alert and oriented to person, place, and time. Cranial Nerves: No cranial nerve deficit. Motor: No abnormal muscle tone. Coordination: Coordination normal.      Deep Tendon Reflexes: Reflexes are normal and symmetric. Psychiatric:         Behavior: Behavior normal.         Judgment: Judgment normal.                             ASSESSMENT/PLAN:    Jackie Wagoner was seen today for cough, fever, congestion and headache. Diagnoses and all orders for this visit:    Cough, unspecified type  -     POCT COVID-19, Antigen  -     POCT Influenza A/B  -     XR CHEST (2 VW); Future    COVID-19 virus infection  -     benzonatate (TESSALON) 100 MG capsule;  Take 1 capsule by mouth 3 times daily as needed for Cough    Rest fluids  quarantine      Sarahi Melo DO    12/30/2022  11:40 AM

## 2023-04-05 PROBLEM — I10 PRIMARY HYPERTENSION: Status: ACTIVE | Noted: 2019-09-19

## 2023-04-05 PROBLEM — I25.110 CORONARY ARTERY DISEASE INVOLVING NATIVE CORONARY ARTERY OF NATIVE HEART WITH UNSTABLE ANGINA PECTORIS (HCC): Status: ACTIVE | Noted: 2021-10-19

## 2023-10-27 LAB
ABSOLUTE IMMATURE GRANULOCYTE: 0.03 K/UL (ref 0–0.58)
ALBUMIN SERPL-MCNC: 3.8 G/DL (ref 3.5–5.2)
ALP BLD-CCNC: 115 U/L (ref 40–129)
ALT SERPL-CCNC: 15 U/L (ref 0–40)
ANION GAP SERPL CALCULATED.3IONS-SCNC: 16 MMOL/L (ref 7–16)
AST SERPL-CCNC: 31 U/L (ref 0–39)
BASOPHILS ABSOLUTE: 0.06 K/UL (ref 0–0.2)
BASOPHILS RELATIVE PERCENT: 1 % (ref 0–2)
BILIRUB SERPL-MCNC: 0.4 MG/DL (ref 0–1.2)
BUN BLDV-MCNC: 52 MG/DL (ref 6–23)
CALCIUM SERPL-MCNC: 10.1 MG/DL (ref 8.6–10.2)
CHLORIDE BLD-SCNC: 102 MMOL/L (ref 98–107)
CHOLESTEROL: 129 MG/DL
CO2: 19 MMOL/L (ref 22–29)
CREAT SERPL-MCNC: 2.1 MG/DL (ref 0.7–1.2)
EOSINOPHILS ABSOLUTE: 0.16 K/UL (ref 0.05–0.5)
EOSINOPHILS RELATIVE PERCENT: 2 % (ref 0–6)
GFR SERPL CREATININE-BSD FRML MDRD: 30 ML/MIN/1.73M2
GLUCOSE BLD-MCNC: 108 MG/DL (ref 74–99)
HBA1C MFR BLD: 6.5 % (ref 4–5.6)
HCT VFR BLD CALC: 40.5 % (ref 37–54)
HDLC SERPL-MCNC: 40 MG/DL
HEMOGLOBIN: 12.4 G/DL (ref 12.5–16.5)
IMMATURE GRANULOCYTES: 0 % (ref 0–5)
LDL CHOLESTEROL: 63 MG/DL
LYMPHOCYTES ABSOLUTE: 1.68 K/UL (ref 1.5–4)
LYMPHOCYTES RELATIVE PERCENT: 23 % (ref 20–42)
MCH RBC QN AUTO: 26.1 PG (ref 26–35)
MCHC RBC AUTO-ENTMCNC: 30.6 G/DL (ref 32–34.5)
MCV RBC AUTO: 85.1 FL (ref 80–99.9)
MONOCYTES ABSOLUTE: 0.57 K/UL (ref 0.1–0.95)
MONOCYTES RELATIVE PERCENT: 8 % (ref 2–12)
NEUTROPHILS ABSOLUTE: 4.93 K/UL (ref 1.8–7.3)
NEUTROPHILS RELATIVE PERCENT: 66 % (ref 43–80)
PDW BLD-RTO: 13.9 % (ref 11.5–15)
PHOSPHORUS: 3.7 MG/DL (ref 2.5–4.5)
PLATELET # BLD: 189 K/UL (ref 130–450)
PMV BLD AUTO: 12.4 FL (ref 7–12)
POTASSIUM SERPL-SCNC: 4.8 MMOL/L (ref 3.5–5)
RBC # BLD: 4.76 M/UL (ref 3.8–5.8)
SODIUM BLD-SCNC: 137 MMOL/L (ref 132–146)
TOTAL PROTEIN: 7.1 G/DL (ref 6.4–8.3)
TRIGL SERPL-MCNC: 131 MG/DL
VITAMIN D 25-HYDROXY: 64 NG/ML (ref 30–100)
VLDLC SERPL CALC-MCNC: 26 MG/DL
WBC # BLD: 7.4 K/UL (ref 4.5–11.5)

## 2023-11-01 LAB — PTH-INTACT SERPL-MCNC: 103 PG/ML (ref 15–65)

## 2024-01-24 LAB
ABSOLUTE IMMATURE GRANULOCYTE: <0.03 K/UL (ref 0–0.58)
ALBUMIN SERPL-MCNC: 4.1 G/DL (ref 3.5–5.2)
ALP BLD-CCNC: 80 U/L (ref 40–129)
ALT SERPL-CCNC: 10 U/L (ref 0–40)
ANION GAP SERPL CALCULATED.3IONS-SCNC: 20 MMOL/L (ref 7–16)
AST SERPL-CCNC: 33 U/L (ref 0–39)
BASOPHILS ABSOLUTE: 0.1 K/UL (ref 0–0.2)
BASOPHILS RELATIVE PERCENT: 1 % (ref 0–2)
BILIRUB SERPL-MCNC: 0.4 MG/DL (ref 0–1.2)
BUN BLDV-MCNC: 40 MG/DL (ref 6–23)
CALCIUM SERPL-MCNC: 10.5 MG/DL (ref 8.6–10.2)
CHLORIDE BLD-SCNC: 104 MMOL/L (ref 98–107)
CHOLESTEROL: 120 MG/DL
CO2: 19 MMOL/L (ref 22–29)
CREAT SERPL-MCNC: 2.4 MG/DL (ref 0.7–1.2)
EOSINOPHILS ABSOLUTE: 0.71 K/UL (ref 0.05–0.5)
EOSINOPHILS RELATIVE PERCENT: 9 % (ref 0–6)
GFR SERPL CREATININE-BSD FRML MDRD: 27 ML/MIN/1.73M2
GLUCOSE BLD-MCNC: 105 MG/DL (ref 74–99)
HBA1C MFR BLD: 6.1 % (ref 4–5.6)
HCT VFR BLD CALC: 41.8 % (ref 37–54)
HDLC SERPL-MCNC: 33 MG/DL
HEMOGLOBIN: 12.6 G/DL (ref 12.5–16.5)
IMMATURE GRANULOCYTES: 0 % (ref 0–5)
LDL CHOLESTEROL: 63 MG/DL
LYMPHOCYTES ABSOLUTE: 1.52 K/UL (ref 1.5–4)
LYMPHOCYTES RELATIVE PERCENT: 20 % (ref 20–42)
MCH RBC QN AUTO: 25.5 PG (ref 26–35)
MCHC RBC AUTO-ENTMCNC: 30.1 G/DL (ref 32–34.5)
MCV RBC AUTO: 84.6 FL (ref 80–99.9)
MONOCYTES ABSOLUTE: 0.68 K/UL (ref 0.1–0.95)
MONOCYTES RELATIVE PERCENT: 9 % (ref 2–12)
NEUTROPHILS ABSOLUTE: 4.49 K/UL (ref 1.8–7.3)
NEUTROPHILS RELATIVE PERCENT: 60 % (ref 43–80)
PDW BLD-RTO: 14 % (ref 11.5–15)
PLATELET, FLUORESCENCE: 177 K/UL (ref 130–450)
PMV BLD AUTO: 13.2 FL (ref 7–12)
POTASSIUM SERPL-SCNC: 5 MMOL/L (ref 3.5–5)
RBC # BLD: 4.94 M/UL (ref 3.8–5.8)
SODIUM BLD-SCNC: 143 MMOL/L (ref 132–146)
TOTAL PROTEIN: 8.1 G/DL (ref 6.4–8.3)
TRIGL SERPL-MCNC: 122 MG/DL
TSH SERPL DL<=0.05 MIU/L-ACNC: 3.69 UIU/ML (ref 0.27–4.2)
VLDLC SERPL CALC-MCNC: 24 MG/DL
WBC # BLD: 7.5 K/UL (ref 4.5–11.5)

## 2024-02-12 PROBLEM — I12.9 BENIGN HYPERTENSIVE KIDNEY DISEASE WITH CHRONIC KIDNEY DISEASE: Status: ACTIVE | Noted: 2023-05-17

## 2024-02-12 PROBLEM — N18.4 CHRONIC KIDNEY DISEASE, STAGE IV (SEVERE) (HCC): Status: ACTIVE | Noted: 2023-05-17

## 2024-02-12 PROBLEM — I21.9 ACUTE MYOCARDIAL INFARCTION (HCC): Status: ACTIVE | Noted: 2023-09-12

## 2024-02-26 ENCOUNTER — TELEPHONE (OUTPATIENT)
Age: 84
End: 2024-02-26

## 2024-02-26 RX ORDER — ERGOCALCIFEROL 1.25 MG/1
50000 CAPSULE ORAL WEEKLY
Qty: 12 CAPSULE | Refills: 3 | Status: SHIPPED | OUTPATIENT
Start: 2024-02-26

## 2024-02-26 NOTE — TELEPHONE ENCOUNTER
Patient called for medication refill    Requested Prescriptions     Pending Prescriptions Disp Refills    ergocalciferol (ERGOCALCIFEROL) 1.25 MG (84382 UT) capsule 4 capsule 3     Sig: Take 1 capsule by mouth once a week On wednesdays     Last Appt: Visit date not found   Next Appt: 4/19/2024

## 2024-02-26 NOTE — TELEPHONE ENCOUNTER
Patient called needs refill on     Ergo caliserole  VITAMIN   D 2  50,000 IU ONE Q WEEK   # 12  (NINETY DAY)      PHARM # [facility] [phone]

## 2024-04-09 LAB — DIABETIC RETINOPATHY: NEGATIVE

## 2024-05-06 ENCOUNTER — OFFICE VISIT (OUTPATIENT)
Age: 84
End: 2024-05-06
Payer: MEDICARE

## 2024-05-06 VITALS
WEIGHT: 257.4 LBS | OXYGEN SATURATION: 97 % | HEIGHT: 67 IN | SYSTOLIC BLOOD PRESSURE: 138 MMHG | BODY MASS INDEX: 40.4 KG/M2 | DIASTOLIC BLOOD PRESSURE: 68 MMHG | TEMPERATURE: 97.4 F | RESPIRATION RATE: 18 BRPM | HEART RATE: 50 BPM

## 2024-05-06 DIAGNOSIS — N18.4 CHRONIC KIDNEY DISEASE, STAGE IV (SEVERE) (HCC): ICD-10-CM

## 2024-05-06 DIAGNOSIS — N18.4 STAGE 4 CHRONIC KIDNEY DISEASE (HCC): ICD-10-CM

## 2024-05-06 DIAGNOSIS — E11.42 TYPE 2 DIABETES MELLITUS WITH DIABETIC POLYNEUROPATHY, WITH LONG-TERM CURRENT USE OF INSULIN (HCC): ICD-10-CM

## 2024-05-06 DIAGNOSIS — I48.0 PAROXYSMAL ATRIAL FIBRILLATION (HCC): ICD-10-CM

## 2024-05-06 DIAGNOSIS — Z00.00 ENCOUNTER FOR MEDICARE ANNUAL WELLNESS EXAM: Primary | ICD-10-CM

## 2024-05-06 DIAGNOSIS — Z71.89 ACP (ADVANCE CARE PLANNING): ICD-10-CM

## 2024-05-06 DIAGNOSIS — I25.10 ATHEROSCLEROSIS OF NATIVE CORONARY ARTERY OF NATIVE HEART WITHOUT ANGINA PECTORIS: ICD-10-CM

## 2024-05-06 DIAGNOSIS — E78.2 MIXED HYPERLIPIDEMIA: ICD-10-CM

## 2024-05-06 DIAGNOSIS — G47.33 OSA (OBSTRUCTIVE SLEEP APNEA): ICD-10-CM

## 2024-05-06 DIAGNOSIS — E66.01 MORBID OBESITY WITH BODY MASS INDEX (BMI) OF 40.0 TO 44.9 IN ADULT (HCC): ICD-10-CM

## 2024-05-06 DIAGNOSIS — I10 ESSENTIAL HYPERTENSION, BENIGN: ICD-10-CM

## 2024-05-06 DIAGNOSIS — Z79.4 TYPE 2 DIABETES MELLITUS WITH DIABETIC POLYNEUROPATHY, WITH LONG-TERM CURRENT USE OF INSULIN (HCC): ICD-10-CM

## 2024-05-06 PROCEDURE — 3078F DIAST BP <80 MM HG: CPT | Performed by: FAMILY MEDICINE

## 2024-05-06 PROCEDURE — 1123F ACP DISCUSS/DSCN MKR DOCD: CPT | Performed by: FAMILY MEDICINE

## 2024-05-06 PROCEDURE — G0438 PPPS, INITIAL VISIT: HCPCS | Performed by: FAMILY MEDICINE

## 2024-05-06 PROCEDURE — 3044F HG A1C LEVEL LT 7.0%: CPT | Performed by: FAMILY MEDICINE

## 2024-05-06 PROCEDURE — 3075F SYST BP GE 130 - 139MM HG: CPT | Performed by: FAMILY MEDICINE

## 2024-05-06 RX ORDER — BLOOD SUGAR DIAGNOSTIC
1 STRIP MISCELLANEOUS
COMMUNITY
Start: 2024-02-26

## 2024-05-06 SDOH — ECONOMIC STABILITY: HOUSING INSECURITY
IN THE LAST 12 MONTHS, WAS THERE A TIME WHEN YOU DID NOT HAVE A STEADY PLACE TO SLEEP OR SLEPT IN A SHELTER (INCLUDING NOW)?: NO

## 2024-05-06 SDOH — ECONOMIC STABILITY: FOOD INSECURITY: WITHIN THE PAST 12 MONTHS, YOU WORRIED THAT YOUR FOOD WOULD RUN OUT BEFORE YOU GOT MONEY TO BUY MORE.: NEVER TRUE

## 2024-05-06 SDOH — ECONOMIC STABILITY: FOOD INSECURITY: WITHIN THE PAST 12 MONTHS, THE FOOD YOU BOUGHT JUST DIDN'T LAST AND YOU DIDN'T HAVE MONEY TO GET MORE.: NEVER TRUE

## 2024-05-06 SDOH — ECONOMIC STABILITY: INCOME INSECURITY: HOW HARD IS IT FOR YOU TO PAY FOR THE VERY BASICS LIKE FOOD, HOUSING, MEDICAL CARE, AND HEATING?: NOT HARD AT ALL

## 2024-05-06 ASSESSMENT — LIFESTYLE VARIABLES
HOW MANY STANDARD DRINKS CONTAINING ALCOHOL DO YOU HAVE ON A TYPICAL DAY: 1 OR 2
HOW OFTEN DO YOU HAVE A DRINK CONTAINING ALCOHOL: 2-4 TIMES A MONTH

## 2024-05-06 ASSESSMENT — PATIENT HEALTH QUESTIONNAIRE - PHQ9
SUM OF ALL RESPONSES TO PHQ9 QUESTIONS 1 & 2: 0
SUM OF ALL RESPONSES TO PHQ QUESTIONS 1-9: 0
SUM OF ALL RESPONSES TO PHQ QUESTIONS 1-9: 0
1. LITTLE INTEREST OR PLEASURE IN DOING THINGS: NOT AT ALL
SUM OF ALL RESPONSES TO PHQ QUESTIONS 1-9: 0
2. FEELING DOWN, DEPRESSED OR HOPELESS: NOT AT ALL
SUM OF ALL RESPONSES TO PHQ QUESTIONS 1-9: 0

## 2024-05-06 NOTE — PROGRESS NOTES
tablet by mouth daily Yes Wilbur Morgan MD   furosemide (LASIX) 40 MG tablet Take 1 tablet by mouth daily Yes Wilbur Morgan MD   lisinopril (PRINIVIL;ZESTRIL) 10 MG tablet Take 1 tablet by mouth daily Yes Wilbur Morgan MD   nitroGLYCERIN (NITROSTAT) 0.4 MG SL tablet Place 1 tablet under the tongue every 5 minutes as needed for Chest pain up to max of 3 total doses. If no relief after 1 dose, call 911. Yes Wilbur Morgan MD   acetaminophen (TYLENOL) 500 MG tablet Take 2 tablets by mouth every 8 hours as needed Yes Wilbur Morgan MD   amLODIPine (NORVASC) 5 MG tablet Take 1 tablet by mouth daily Yes Maulik Cowan MD       [facility] (Including outside providers/suppliers regularly involved in providing care):   Patient Care Team:  Maulik Cowan MD as PCP - General (Family Medicine)  Maulik Cowan MD as PCP - Empaneled Provider  Rasheed Arteaga MD as Surgeon (Urology)  Earl Curtis MD as Surgeon (Orthopedic Surgery)  Brendon Glover MD as Consulting Physician (Cardiology)     Reviewed and updated this visit:  Tobacco  Allergies  Meds  Problems  Med Hx  Surg Hx  Soc Hx  Fam Hx      Maulik Cowan MD     Note: This report was transcribed using Dragon voice recognition software.  Every effort was made to ensure accuracy, however inadvertent computerized transcription errors may be present.

## 2024-05-06 NOTE — PATIENT INSTRUCTIONS
condition or this instruction, always ask your healthcare professional. [employer] disclaims any warranty or liability for your use of this information.           Learning About Being Active as an Older Adult  Why is being active important as you get older?     Being active is one of the best things you can do for your health. And it's never too late to start. Being active--or getting active, if you aren't already--has definite benefits. It can:  Give you more energy,  Keep your mind sharp.  Improve balance to reduce your risk of falls.  Help you manage chronic illness with fewer medicines.  No matter how old you are, how fit you are, or what health problems you have, there is a form of activity that will work for you. And the more physical activity you can do, the better your overall health will be.  What kinds of activity can help you stay healthy?  Being more active will make your daily activities easier. Physical activity includes planned exercise and things you do in daily life. There are four types of activity:  Aerobic.  Doing aerobic activity makes your heart and lungs strong.  Includes walking, dancing, and gardening.  Aim for at least 2½ hours spread throughout the week.  It improves your energy and can help you sleep better.  Muscle-strengthening.  This type of activity can help maintain muscle and strengthen bones.  Includes climbing stairs, using resistance bands, and lifting or carrying heavy loads.  Aim for at least twice a week.  It can help protect the knees and other joints.  Stretching.  Stretching gives you better range of motion in joints and muscles.  Includes upper arm stretches, calf stretches, and gentle yoga.  Aim for at least twice a week, preferably after your muscles are warmed up from other activities.  It can help you function better in daily life.  Balancing.  This helps you stay coordinated and have good posture.  Includes heel-to-toe walking, isaiah chi, and certain types

## 2024-05-07 PROBLEM — I48.0 PAROXYSMAL ATRIAL FIBRILLATION (HCC): Status: ACTIVE | Noted: 2024-05-07

## 2024-05-07 PROBLEM — G47.33 OSA (OBSTRUCTIVE SLEEP APNEA): Status: ACTIVE | Noted: 2024-05-07

## 2024-05-07 PROBLEM — E66.01 MORBID OBESITY WITH BODY MASS INDEX (BMI) OF 40.0 TO 44.9 IN ADULT (HCC): Status: ACTIVE | Noted: 2024-05-07

## 2024-05-07 PROBLEM — Z00.00 ENCOUNTER FOR MEDICARE ANNUAL WELLNESS EXAM: Status: ACTIVE | Noted: 2024-05-07

## 2024-05-07 PROBLEM — Z71.89 ACP (ADVANCE CARE PLANNING): Status: ACTIVE | Noted: 2024-05-07

## 2024-06-07 PROBLEM — I25.2 HISTORY OF ACUTE MYOCARDIAL INFARCTION: Status: ACTIVE | Noted: 2023-09-12

## 2024-07-23 ENCOUNTER — OFFICE VISIT (OUTPATIENT)
Age: 84
End: 2024-07-23
Payer: MEDICARE

## 2024-07-23 VITALS
WEIGHT: 254.4 LBS | SYSTOLIC BLOOD PRESSURE: 134 MMHG | DIASTOLIC BLOOD PRESSURE: 56 MMHG | RESPIRATION RATE: 18 BRPM | OXYGEN SATURATION: 97 % | HEART RATE: 59 BPM | TEMPERATURE: 97.5 F | HEIGHT: 66 IN | BODY MASS INDEX: 40.88 KG/M2

## 2024-07-23 DIAGNOSIS — I25.10 ATHEROSCLEROSIS OF NATIVE CORONARY ARTERY OF NATIVE HEART WITHOUT ANGINA PECTORIS: ICD-10-CM

## 2024-07-23 DIAGNOSIS — E66.01 MORBID OBESITY WITH BODY MASS INDEX (BMI) OF 40.0 TO 44.9 IN ADULT (HCC): ICD-10-CM

## 2024-07-23 DIAGNOSIS — Z79.4 TYPE 2 DIABETES MELLITUS WITH DIABETIC POLYNEUROPATHY, WITH LONG-TERM CURRENT USE OF INSULIN (HCC): ICD-10-CM

## 2024-07-23 DIAGNOSIS — I48.0 PAROXYSMAL ATRIAL FIBRILLATION (HCC): ICD-10-CM

## 2024-07-23 DIAGNOSIS — E11.42 TYPE 2 DIABETES MELLITUS WITH DIABETIC POLYNEUROPATHY, WITH LONG-TERM CURRENT USE OF INSULIN (HCC): ICD-10-CM

## 2024-07-23 DIAGNOSIS — N18.4 CHRONIC KIDNEY DISEASE, STAGE IV (SEVERE) (HCC): ICD-10-CM

## 2024-07-23 DIAGNOSIS — I10 ESSENTIAL HYPERTENSION, BENIGN: Primary | ICD-10-CM

## 2024-07-23 DIAGNOSIS — G47.33 OSA (OBSTRUCTIVE SLEEP APNEA): ICD-10-CM

## 2024-07-23 PROCEDURE — G8427 DOCREV CUR MEDS BY ELIG CLIN: HCPCS | Performed by: FAMILY MEDICINE

## 2024-07-23 PROCEDURE — 3075F SYST BP GE 130 - 139MM HG: CPT | Performed by: FAMILY MEDICINE

## 2024-07-23 PROCEDURE — 1036F TOBACCO NON-USER: CPT | Performed by: FAMILY MEDICINE

## 2024-07-23 PROCEDURE — 3078F DIAST BP <80 MM HG: CPT | Performed by: FAMILY MEDICINE

## 2024-07-23 PROCEDURE — 99214 OFFICE O/P EST MOD 30 MIN: CPT | Performed by: FAMILY MEDICINE

## 2024-07-23 PROCEDURE — G8417 CALC BMI ABV UP PARAM F/U: HCPCS | Performed by: FAMILY MEDICINE

## 2024-07-23 PROCEDURE — 1123F ACP DISCUSS/DSCN MKR DOCD: CPT | Performed by: FAMILY MEDICINE

## 2024-07-23 PROCEDURE — 3044F HG A1C LEVEL LT 7.0%: CPT | Performed by: FAMILY MEDICINE

## 2024-07-25 ASSESSMENT — ENCOUNTER SYMPTOMS
GASTROINTESTINAL NEGATIVE: 1
COUGH: 0
RESPIRATORY NEGATIVE: 1
SHORTNESS OF BREATH: 0
BACK PAIN: 1

## 2024-07-25 NOTE — PROGRESS NOTES
edema.      Comments: He is wearing knee-high support stockings   Lymphadenopathy:      Cervical: No cervical adenopathy.   Skin:     General: Skin is warm and dry.      Coloration: Skin is not jaundiced or pale.   Neurological:      General: No focal deficit present.      Mental Status: He is alert and oriented to person, place, and time. Mental status is at baseline.   Psychiatric:         Mood and Affect: Mood normal.         Behavior: Behavior normal.         Thought Content: Thought content normal.         Judgment: Judgment normal.                  An electronic signature was used to authenticate this note.    --Maulik Cowan MD     Note: This report was transcribed using Dragon voice recognition software.  Every effort was made to ensure accuracy, however inadvertent computerized transcription errors may be present.

## 2024-08-12 ENCOUNTER — TELEPHONE (OUTPATIENT)
Age: 84
End: 2024-08-12

## 2024-08-12 NOTE — TELEPHONE ENCOUNTER
PT would like to be scheduled for a cognitive test. Along with his Spouse who is also a PT with us.

## 2024-10-01 RX ORDER — ROSUVASTATIN CALCIUM 20 MG/1
20 TABLET, COATED ORAL DAILY
Qty: 90 TABLET | Refills: 3 | Status: SHIPPED | OUTPATIENT
Start: 2024-10-01

## 2024-10-01 NOTE — TELEPHONE ENCOUNTER
Patient called for medication refill    Requested Prescriptions     Pending Prescriptions Disp Refills    rosuvastatin (CRESTOR) 20 MG tablet 90 tablet 3     Sig: Take 1 tablet by mouth daily     Last Appt: 7/23/2024   Next Appt: 10/14/2024

## 2024-10-14 ENCOUNTER — OFFICE VISIT (OUTPATIENT)
Age: 84
End: 2024-10-14
Payer: MEDICARE

## 2024-10-14 VITALS
BODY MASS INDEX: 40.15 KG/M2 | WEIGHT: 249.8 LBS | HEART RATE: 60 BPM | TEMPERATURE: 97.3 F | HEIGHT: 66 IN | RESPIRATION RATE: 18 BRPM | DIASTOLIC BLOOD PRESSURE: 68 MMHG | SYSTOLIC BLOOD PRESSURE: 120 MMHG | OXYGEN SATURATION: 98 %

## 2024-10-14 DIAGNOSIS — E66.01 MORBID OBESITY WITH BODY MASS INDEX (BMI) OF 40.0 TO 44.9 IN ADULT: ICD-10-CM

## 2024-10-14 DIAGNOSIS — G47.33 OSA (OBSTRUCTIVE SLEEP APNEA): ICD-10-CM

## 2024-10-14 DIAGNOSIS — E78.2 MIXED HYPERLIPIDEMIA: ICD-10-CM

## 2024-10-14 DIAGNOSIS — N18.4 CHRONIC KIDNEY DISEASE, STAGE IV (SEVERE) (HCC): ICD-10-CM

## 2024-10-14 DIAGNOSIS — I48.0 PAROXYSMAL ATRIAL FIBRILLATION (HCC): ICD-10-CM

## 2024-10-14 DIAGNOSIS — I10 ESSENTIAL HYPERTENSION, BENIGN: Primary | ICD-10-CM

## 2024-10-14 DIAGNOSIS — C61 PROSTATE CANCER (HCC): ICD-10-CM

## 2024-10-14 DIAGNOSIS — I25.10 ATHEROSCLEROSIS OF NATIVE CORONARY ARTERY OF NATIVE HEART WITHOUT ANGINA PECTORIS: ICD-10-CM

## 2024-10-14 DIAGNOSIS — Z79.4 TYPE 2 DIABETES MELLITUS WITH DIABETIC POLYNEUROPATHY, WITH LONG-TERM CURRENT USE OF INSULIN (HCC): ICD-10-CM

## 2024-10-14 DIAGNOSIS — E11.42 TYPE 2 DIABETES MELLITUS WITH DIABETIC POLYNEUROPATHY, WITH LONG-TERM CURRENT USE OF INSULIN (HCC): ICD-10-CM

## 2024-10-14 PROCEDURE — 3074F SYST BP LT 130 MM HG: CPT | Performed by: FAMILY MEDICINE

## 2024-10-14 PROCEDURE — G8427 DOCREV CUR MEDS BY ELIG CLIN: HCPCS | Performed by: FAMILY MEDICINE

## 2024-10-14 PROCEDURE — 3044F HG A1C LEVEL LT 7.0%: CPT | Performed by: FAMILY MEDICINE

## 2024-10-14 PROCEDURE — 1123F ACP DISCUSS/DSCN MKR DOCD: CPT | Performed by: FAMILY MEDICINE

## 2024-10-14 PROCEDURE — G8484 FLU IMMUNIZE NO ADMIN: HCPCS | Performed by: FAMILY MEDICINE

## 2024-10-14 PROCEDURE — 3078F DIAST BP <80 MM HG: CPT | Performed by: FAMILY MEDICINE

## 2024-10-14 PROCEDURE — G8417 CALC BMI ABV UP PARAM F/U: HCPCS | Performed by: FAMILY MEDICINE

## 2024-10-14 PROCEDURE — 99214 OFFICE O/P EST MOD 30 MIN: CPT | Performed by: FAMILY MEDICINE

## 2024-10-14 PROCEDURE — 1036F TOBACCO NON-USER: CPT | Performed by: FAMILY MEDICINE

## 2024-10-14 RX ORDER — AMLODIPINE BESYLATE 5 MG/1
5 TABLET ORAL DAILY
Qty: 90 TABLET | Refills: 3 | Status: SHIPPED | OUTPATIENT
Start: 2024-10-14

## 2024-10-14 RX ORDER — SOTAGLIFLOZIN 200 MG/1
200 TABLET ORAL DAILY
COMMUNITY
Start: 2024-08-07

## 2024-10-14 RX ORDER — LISINOPRIL 10 MG/1
10 TABLET ORAL DAILY
Qty: 90 TABLET | Refills: 3 | Status: SHIPPED | OUTPATIENT
Start: 2024-10-14

## 2024-10-14 NOTE — TELEPHONE ENCOUNTER
Patient called for medication refill    Requested Prescriptions     Pending Prescriptions Disp Refills    amLODIPine (NORVASC) 5 MG tablet 90 tablet 3     Sig: Take 1 tablet by mouth daily     Last Appt: 7/23/2024   Next Appt: 10/14/2024

## 2024-10-14 NOTE — TELEPHONE ENCOUNTER
Patient called for medication refill    Requested Prescriptions     Pending Prescriptions Disp Refills    amLODIPine (NORVASC) 5 MG tablet 90 tablet 3     Sig: Take 1 tablet by mouth daily    lisinopril (PRINIVIL;ZESTRIL) 10 MG tablet 90 tablet 3     Sig: Take 1 tablet by mouth daily     Last Appt: 7/23/2024   Next Appt: 10/14/2024

## 2024-10-17 PROBLEM — C61 PROSTATE CANCER (HCC): Status: ACTIVE | Noted: 2024-10-17

## 2024-10-17 ASSESSMENT — ENCOUNTER SYMPTOMS
BACK PAIN: 1
COUGH: 0
GASTROINTESTINAL NEGATIVE: 1
RESPIRATORY NEGATIVE: 1
SHORTNESS OF BREATH: 0

## 2024-10-17 NOTE — PROGRESS NOTES
Thee Fountain (:  1940) is a 84 y.o. male,Established patient, here for evaluation of the following chief complaint(s):  3 Month Follow-Up      Assessment & Plan   1. Essential hypertension, benign  2. Type 2 diabetes mellitus with diabetic polyneuropathy, with long-term current use of insulin ([facility]) stable on medications A1c in March was 6.3 continue current medications follow-up in 3 months  3. Chronic kidney disease, stage IV (severe) ([facility]) creatinines been stable runs about 2.0-2.2 he follows with renal on a regular basis he will continue current medications and follow-up here in 3 months  4. Paroxysmal atrial fibrillation ([facility]) currently in sinus rhythm follows up with cardiology here in [facility] and [facility] regularly continue current medications continue Xarelto samples were given  5. Atherosclerosis of native coronary artery of native heart without angina pectoris denies chest pain  6. OMARI (obstructive sleep apnea) compliant with his CPAP  7. Morbid obesity with body mass index (BMI) of 40.0 to 44.9 in adult ([facility]) once again discussed diet exercise and weight loss he has lost a good amount of weight over the last several years.  8.  Hyperlipidemia  9.  Prostate cancer ([facility]) check PSA he follows with urology once a year    Return in about 3 months (around 2025).       Subjective   HPI  84-year-old comes in for his 3-month appointment.  Previously admitted for C. difficile colitis after taking Septra for a UTI creatinine peaked at 2.5 which improved with hydration however during this hospitalization developed atrial fibrillation.  Cardiac workup revealed multivessel coronary disease by catheterization.  Patient was asymptomatic without chest pain.  Cardiology recommended treating this medically.  He also sees a cardiologist in [place] who concurs.  He has had no chest pain.  He is on anticoagulation.  He does have chronic kidney disease which he follows with nephrology for.  Remote

## 2024-12-30 NOTE — TELEPHONE ENCOUNTER
Name of Medication(s) Requested:  Requested Prescriptions     Pending Prescriptions Disp Refills    amLODIPine (NORVASC) 5 MG tablet 90 tablet 3     Sig: Take 1 tablet by mouth daily    ergocalciferol (ERGOCALCIFEROL) 1.25 MG (80189 UT) capsule 12 capsule 3     Sig: Take 1 capsule by mouth once a week On wednesdays    lisinopril (PRINIVIL;ZESTRIL) 10 MG tablet 90 tablet 3     Sig: Take 1 tablet by mouth daily    rosuvastatin (CRESTOR) 20 MG tablet 90 tablet 3     Sig: Take 1 tablet by mouth daily    fenofibrate 160 MG tablet 90 tablet 3     Sig: Take 1 tablet by mouth daily    furosemide (LASIX) 40 MG tablet 90 tablet 3     Sig: Take 1 tablet by mouth daily    XARELTO 15 MG TABS tablet 90 tablet 3     Sig: Take 1 tablet by mouth daily       Medication is on current medication list Yes    Dosage and directions were verified? Yes    Quantity verified: 90 day supply     Pharmacy Verified?  Yes    Last Appointment:  10/14/2024    Future appts:  Future Appointments   Date Time Provider Department Center   1/22/2025 11:00 AM Maulik Cowan MD [facility]   5/6/2025 10:00 AM Maulik Cowan MD [facility]        (If no appt send self scheduling link. .REFILLAPPT)  Scheduling request sent?     [x] Yes  [] No    Does patient need updated?  [] Yes  [x] No

## 2024-12-31 RX ORDER — AMLODIPINE BESYLATE 5 MG/1
5 TABLET ORAL DAILY
Qty: 90 TABLET | Refills: 3 | Status: SHIPPED | OUTPATIENT
Start: 2024-12-31

## 2024-12-31 RX ORDER — RIVAROXABAN 15 MG/1
15 TABLET, FILM COATED ORAL DAILY
Qty: 90 TABLET | Refills: 3 | Status: SHIPPED | OUTPATIENT
Start: 2024-12-31

## 2024-12-31 RX ORDER — FENOFIBRATE 160 MG/1
160 TABLET ORAL DAILY
Qty: 90 TABLET | Refills: 3 | Status: SHIPPED | OUTPATIENT
Start: 2024-12-31

## 2024-12-31 RX ORDER — ERGOCALCIFEROL 1.25 MG/1
50000 CAPSULE ORAL WEEKLY
Qty: 12 CAPSULE | Refills: 3 | Status: SHIPPED | OUTPATIENT
Start: 2024-12-31

## 2024-12-31 RX ORDER — LISINOPRIL 10 MG/1
10 TABLET ORAL DAILY
Qty: 90 TABLET | Refills: 3 | Status: SHIPPED | OUTPATIENT
Start: 2024-12-31

## 2024-12-31 RX ORDER — ROSUVASTATIN CALCIUM 20 MG/1
20 TABLET, COATED ORAL DAILY
Qty: 90 TABLET | Refills: 3 | Status: SHIPPED | OUTPATIENT
Start: 2024-12-31

## 2024-12-31 RX ORDER — FUROSEMIDE 40 MG/1
40 TABLET ORAL DAILY
Qty: 90 TABLET | Refills: 3 | Status: SHIPPED | OUTPATIENT
Start: 2024-12-31

## 2025-01-07 ENCOUNTER — OFFICE VISIT (OUTPATIENT)
Age: 85
End: 2025-01-07
Payer: MEDICARE

## 2025-01-07 ENCOUNTER — TELEPHONE (OUTPATIENT)
Age: 85
End: 2025-01-07

## 2025-01-07 VITALS
RESPIRATION RATE: 18 BRPM | BODY MASS INDEX: 39.25 KG/M2 | WEIGHT: 244.2 LBS | HEIGHT: 66 IN | SYSTOLIC BLOOD PRESSURE: 110 MMHG | TEMPERATURE: 97.6 F | OXYGEN SATURATION: 98 % | DIASTOLIC BLOOD PRESSURE: 68 MMHG | HEART RATE: 45 BPM

## 2025-01-07 DIAGNOSIS — R31.9 URINARY TRACT INFECTION WITH HEMATURIA, SITE UNSPECIFIED: Primary | ICD-10-CM

## 2025-01-07 DIAGNOSIS — N39.0 URINARY TRACT INFECTION WITH HEMATURIA, SITE UNSPECIFIED: Primary | ICD-10-CM

## 2025-01-07 DIAGNOSIS — C61 PROSTATE CANCER (HCC): ICD-10-CM

## 2025-01-07 LAB
BILIRUBIN, POC: NORMAL
BLOOD URINE, POC: NORMAL
CLARITY, POC: NORMAL
COLOR, POC: NORMAL
GLUCOSE URINE, POC: NORMAL MG/DL
KETONES, POC: NORMAL MG/DL
LEUKOCYTE EST, POC: NORMAL
NITRITE, POC: NORMAL
PH, POC: 6
PROTEIN, POC: NORMAL MG/DL
SPECIFIC GRAVITY, POC: 1.01
UROBILINOGEN, POC: 0.2 MG/DL

## 2025-01-07 PROCEDURE — G8417 CALC BMI ABV UP PARAM F/U: HCPCS | Performed by: FAMILY MEDICINE

## 2025-01-07 PROCEDURE — 3074F SYST BP LT 130 MM HG: CPT | Performed by: FAMILY MEDICINE

## 2025-01-07 PROCEDURE — M1299 PR FLU IMMUNIZE ORDER/ADMIN: HCPCS | Performed by: FAMILY MEDICINE

## 2025-01-07 PROCEDURE — 3078F DIAST BP <80 MM HG: CPT | Performed by: FAMILY MEDICINE

## 2025-01-07 PROCEDURE — 81002 URINALYSIS NONAUTO W/O SCOPE: CPT | Performed by: FAMILY MEDICINE

## 2025-01-07 PROCEDURE — G8428 CUR MEDS NOT DOCUMENT: HCPCS | Performed by: FAMILY MEDICINE

## 2025-01-07 PROCEDURE — 1123F ACP DISCUSS/DSCN MKR DOCD: CPT | Performed by: FAMILY MEDICINE

## 2025-01-07 PROCEDURE — 99213 OFFICE O/P EST LOW 20 MIN: CPT | Performed by: FAMILY MEDICINE

## 2025-01-07 PROCEDURE — 1036F TOBACCO NON-USER: CPT | Performed by: FAMILY MEDICINE

## 2025-01-07 RX ORDER — CEPHALEXIN 500 MG/1
500 CAPSULE ORAL 2 TIMES DAILY
Qty: 14 CAPSULE | Refills: 0 | Status: SHIPPED | OUTPATIENT
Start: 2025-01-07 | End: 2025-01-14

## 2025-01-07 ASSESSMENT — ENCOUNTER SYMPTOMS: GASTROINTESTINAL NEGATIVE: 1

## 2025-01-07 NOTE — PROGRESS NOTES
Thee Fountain (:  1940) is a 84 y.o. male,Established patient, here for evaluation of the following chief complaint(s):  Urinary Pain and Hematuria         Assessment & Plan  Urinary tract infection with hematuria, site unspecified       Orders:    POCT Urinalysis no Micro    cephALEXin (KEFLEX) 500 MG capsule; Take 1 capsule by mouth 2 times daily for 7 days  Increase fluids  Follow-up if no better    No follow-ups on file.       Subjective   Urinary Pain   Associated symptoms include frequency and hematuria. Pertinent negatives include no chills.   Hematuria  Irritative symptoms include frequency. Associated symptoms include bladder pain and dysuria. Pertinent negatives include no chills or fever.     84-year-old comes in with dysuria and hematuria which started last night woke up at 2 5 and 7 with frequency and burning then saw some blood in his urine.  Treated for this back in  with Septra then developed C. difficile.  I did tell him that any antibiotic could potentially cause C. difficile.  I told him most of these urinary tract infections are from E. coli  Review of Systems   Constitutional:  Negative for activity change, appetite change, chills and fever.   Gastrointestinal: Negative.    Genitourinary:  Positive for dysuria, frequency and hematuria.          Objective /68   Pulse (!) 45   Temp 97.6 °F (36.4 °C)   Resp 18   Ht 1.676 m (5' 6\")   Wt 110.8 kg (244 lb 3.2 oz)   SpO2 98%   BMI 39.41 kg/m²    Physical Exam  Vitals reviewed.   Constitutional:       General: He is not in acute distress.     Appearance: He is not ill-appearing or toxic-appearing.   Abdominal:      Tenderness: There is no right CVA tenderness or left CVA tenderness.   Skin:     General: Skin is warm and dry.      Coloration: Skin is not jaundiced or pale.   Neurological:      Mental Status: He is alert.     Urinalysis grossly bloody             An electronic signature was used to authenticate this

## 2025-01-07 NOTE — TELEPHONE ENCOUNTER
Pt has burning when urinating. Feels like he has to go every 15-20 min but not much coming out. Started last night

## 2025-01-08 LAB — PROSTATE SPECIFIC ANTIGEN: <0.01 NG/ML (ref 0–4)

## 2025-01-09 LAB
CULTURE: ABNORMAL
SPECIMEN DESCRIPTION: ABNORMAL

## 2025-01-10 RX ORDER — ERGOCALCIFEROL 1.25 MG/1
CAPSULE, LIQUID FILLED ORAL
Qty: 12 CAPSULE | Refills: 3 | OUTPATIENT
Start: 2025-01-10

## 2025-01-20 SDOH — ECONOMIC STABILITY: FOOD INSECURITY: WITHIN THE PAST 12 MONTHS, THE FOOD YOU BOUGHT JUST DIDN'T LAST AND YOU DIDN'T HAVE MONEY TO GET MORE.: NEVER TRUE

## 2025-01-20 SDOH — ECONOMIC STABILITY: TRANSPORTATION INSECURITY
IN THE PAST 12 MONTHS, HAS THE LACK OF TRANSPORTATION KEPT YOU FROM MEDICAL APPOINTMENTS OR FROM GETTING MEDICATIONS?: NO

## 2025-01-20 SDOH — ECONOMIC STABILITY: FOOD INSECURITY: WITHIN THE PAST 12 MONTHS, YOU WORRIED THAT YOUR FOOD WOULD RUN OUT BEFORE YOU GOT MONEY TO BUY MORE.: NEVER TRUE

## 2025-01-20 SDOH — ECONOMIC STABILITY: INCOME INSECURITY: IN THE LAST 12 MONTHS, WAS THERE A TIME WHEN YOU WERE NOT ABLE TO PAY THE MORTGAGE OR RENT ON TIME?: NO

## 2025-01-20 SDOH — ECONOMIC STABILITY: TRANSPORTATION INSECURITY
IN THE PAST 12 MONTHS, HAS LACK OF TRANSPORTATION KEPT YOU FROM MEETINGS, WORK, OR FROM GETTING THINGS NEEDED FOR DAILY LIVING?: NO

## 2025-01-22 ENCOUNTER — OFFICE VISIT (OUTPATIENT)
Age: 85
End: 2025-01-22
Payer: MEDICARE

## 2025-01-22 VITALS
HEIGHT: 66 IN | OXYGEN SATURATION: 97 % | SYSTOLIC BLOOD PRESSURE: 114 MMHG | DIASTOLIC BLOOD PRESSURE: 68 MMHG | RESPIRATION RATE: 18 BRPM | WEIGHT: 254.8 LBS | BODY MASS INDEX: 40.95 KG/M2 | TEMPERATURE: 97.7 F | HEART RATE: 69 BPM

## 2025-01-22 DIAGNOSIS — I25.10 ATHEROSCLEROSIS OF NATIVE CORONARY ARTERY OF NATIVE HEART WITHOUT ANGINA PECTORIS: ICD-10-CM

## 2025-01-22 DIAGNOSIS — Z79.4 TYPE 2 DIABETES MELLITUS WITH DIABETIC POLYNEUROPATHY, WITH LONG-TERM CURRENT USE OF INSULIN (HCC): ICD-10-CM

## 2025-01-22 DIAGNOSIS — C61 PROSTATE CANCER (HCC): ICD-10-CM

## 2025-01-22 DIAGNOSIS — I48.0 PAROXYSMAL ATRIAL FIBRILLATION (HCC): ICD-10-CM

## 2025-01-22 DIAGNOSIS — E11.42 TYPE 2 DIABETES MELLITUS WITH DIABETIC POLYNEUROPATHY, WITH LONG-TERM CURRENT USE OF INSULIN (HCC): ICD-10-CM

## 2025-01-22 DIAGNOSIS — N18.4 CHRONIC KIDNEY DISEASE, STAGE IV (SEVERE) (HCC): ICD-10-CM

## 2025-01-22 DIAGNOSIS — I10 ESSENTIAL HYPERTENSION, BENIGN: Primary | ICD-10-CM

## 2025-01-22 PROCEDURE — 3074F SYST BP LT 130 MM HG: CPT | Performed by: FAMILY MEDICINE

## 2025-01-22 PROCEDURE — 1036F TOBACCO NON-USER: CPT | Performed by: FAMILY MEDICINE

## 2025-01-22 PROCEDURE — 99214 OFFICE O/P EST MOD 30 MIN: CPT | Performed by: FAMILY MEDICINE

## 2025-01-22 PROCEDURE — 3078F DIAST BP <80 MM HG: CPT | Performed by: FAMILY MEDICINE

## 2025-01-22 PROCEDURE — 1159F MED LIST DOCD IN RCRD: CPT | Performed by: FAMILY MEDICINE

## 2025-01-22 PROCEDURE — 1123F ACP DISCUSS/DSCN MKR DOCD: CPT | Performed by: FAMILY MEDICINE

## 2025-01-22 PROCEDURE — G8417 CALC BMI ABV UP PARAM F/U: HCPCS | Performed by: FAMILY MEDICINE

## 2025-01-22 PROCEDURE — 3044F HG A1C LEVEL LT 7.0%: CPT | Performed by: FAMILY MEDICINE

## 2025-01-22 PROCEDURE — G8427 DOCREV CUR MEDS BY ELIG CLIN: HCPCS | Performed by: FAMILY MEDICINE

## 2025-01-22 RX ORDER — SODIUM BICARBONATE 650 MG/1
650 TABLET ORAL 3 TIMES DAILY
COMMUNITY
Start: 2025-01-14

## 2025-01-22 SDOH — ECONOMIC STABILITY: FOOD INSECURITY: WITHIN THE PAST 12 MONTHS, YOU WORRIED THAT YOUR FOOD WOULD RUN OUT BEFORE YOU GOT MONEY TO BUY MORE.: NEVER TRUE

## 2025-01-22 SDOH — ECONOMIC STABILITY: FOOD INSECURITY: WITHIN THE PAST 12 MONTHS, THE FOOD YOU BOUGHT JUST DIDN'T LAST AND YOU DIDN'T HAVE MONEY TO GET MORE.: NEVER TRUE

## 2025-01-22 ASSESSMENT — PATIENT HEALTH QUESTIONNAIRE - PHQ9
SUM OF ALL RESPONSES TO PHQ QUESTIONS 1-9: 0
2. FEELING DOWN, DEPRESSED OR HOPELESS: NOT AT ALL
SUM OF ALL RESPONSES TO PHQ QUESTIONS 1-9: 0
1. LITTLE INTEREST OR PLEASURE IN DOING THINGS: NOT AT ALL
SUM OF ALL RESPONSES TO PHQ QUESTIONS 1-9: 0
SUM OF ALL RESPONSES TO PHQ9 QUESTIONS 1 & 2: 0
SUM OF ALL RESPONSES TO PHQ QUESTIONS 1-9: 0

## 2025-01-23 ASSESSMENT — ENCOUNTER SYMPTOMS
RESPIRATORY NEGATIVE: 1
GASTROINTESTINAL NEGATIVE: 1
SHORTNESS OF BREATH: 0
COUGH: 0
BACK PAIN: 1

## 2025-01-23 NOTE — PROGRESS NOTES
Thee Fountain (:  1940) is a 84 y.o. male,Established patient, here for evaluation of the following chief complaint(s):  3 Month Follow-Up      Assessment & Plan   1. Essential hypertension, benign  2. Type 2 diabetes mellitus with diabetic polyneuropathy, with long-term current use of insulin ([facility]) stable on medications A1c this month was 5.9 continue current medications follow-up in 3 months  3. Chronic kidney disease, stage IV (severe) ([facility]) creatinines been stable runs about 2.0-2.2 he follows with renal on a regular basis he will continue current medications and follow-up here in 3 months  4. Paroxysmal atrial fibrillation ([facility]) currently in sinus rhythm follows up with cardiology here in [facility] regularly continue current medications continue   5. Atherosclerosis of native coronary artery of native heart without angina pectoris denies chest pain follows with cardiology stable on medications  6. OMARI (obstructive sleep apnea) compliant with his CPAP follows with pulmonology remained stable on CPAP  7. Morbid obesity with body mass index (BMI) of 40.0 to 44.9 in adult ([facility]) once again discussed diet exercise and weight loss he has lost a good amount of weight over the last several years.  8.  Hyperlipidemia stable on med  9.  Prostate cancer ([facility]) check PSA he follows with urology once a year recent PSA is less than 0.01    No follow-ups on file.       Subjective   HPI  84-year-old comes in for his 3-month appointment.  Previously admitted for C. difficile colitis after taking Septra for a UTI creatinine peaked at 2.5 which improved with hydration however during this hospitalization developed atrial fibrillation.  Cardiac workup revealed multivessel coronary disease by catheterization.  Patient was asymptomatic without chest pain.  Cardiology recommended treating this medically.  He also sees a cardiologist in [place] who concurs.  He has had no chest pain.  He is on

## 2025-01-31 RX ORDER — FENOFIBRATE 160 MG/1
160 TABLET ORAL DAILY
Qty: 90 TABLET | Refills: 3 | Status: SHIPPED | OUTPATIENT
Start: 2025-01-31

## 2025-01-31 NOTE — TELEPHONE ENCOUNTER
Name of Medication(s) Requested:  Requested Prescriptions     Pending Prescriptions Disp Refills    fenofibrate 160 MG tablet 90 tablet 3     Sig: Take 1 tablet by mouth daily       Medication is on current medication list Yes    Dosage and directions were verified? Yes    Quantity verified: 90 day supply     Pharmacy Verified?  Yes    Last Appointment:  1/22/2025    Future appts:  Future Appointments   Date Time Provider Department Center   5/6/2025 10:00 AM Maulik Cowan MD [facility] DEP        (If no appt send self scheduling link. .REFILLAPPT)  Scheduling request sent?     [x] Yes  [] No    Does patient need updated?  [] Yes  [x] No

## 2025-04-18 LAB — DIABETIC RETINOPATHY: NEGATIVE

## 2025-05-03 SDOH — HEALTH STABILITY: PHYSICAL HEALTH: ON AVERAGE, HOW MANY DAYS PER WEEK DO YOU ENGAGE IN MODERATE TO STRENUOUS EXERCISE (LIKE A BRISK WALK)?: 0 DAYS

## 2025-05-03 SDOH — HEALTH STABILITY: PHYSICAL HEALTH: ON AVERAGE, HOW MANY MINUTES DO YOU ENGAGE IN EXERCISE AT THIS LEVEL?: 0 MIN

## 2025-05-03 ASSESSMENT — PATIENT HEALTH QUESTIONNAIRE - PHQ9
2. FEELING DOWN, DEPRESSED OR HOPELESS: NOT AT ALL
1. LITTLE INTEREST OR PLEASURE IN DOING THINGS: NOT AT ALL
SUM OF ALL RESPONSES TO PHQ QUESTIONS 1-9: 0

## 2025-05-03 ASSESSMENT — LIFESTYLE VARIABLES
HOW OFTEN DO YOU HAVE A DRINK CONTAINING ALCOHOL: 2
HOW OFTEN DO YOU HAVE A DRINK CONTAINING ALCOHOL: MONTHLY OR LESS
HOW MANY STANDARD DRINKS CONTAINING ALCOHOL DO YOU HAVE ON A TYPICAL DAY: 1
HOW MANY STANDARD DRINKS CONTAINING ALCOHOL DO YOU HAVE ON A TYPICAL DAY: 1 OR 2
HOW OFTEN DO YOU HAVE SIX OR MORE DRINKS ON ONE OCCASION: 1

## 2025-05-06 ENCOUNTER — OFFICE VISIT (OUTPATIENT)
Age: 85
End: 2025-05-06
Payer: MEDICARE

## 2025-05-06 VITALS
WEIGHT: 244.6 LBS | HEART RATE: 66 BPM | OXYGEN SATURATION: 97 % | SYSTOLIC BLOOD PRESSURE: 106 MMHG | RESPIRATION RATE: 18 BRPM | BODY MASS INDEX: 39.31 KG/M2 | TEMPERATURE: 97.6 F | HEIGHT: 66 IN | DIASTOLIC BLOOD PRESSURE: 68 MMHG

## 2025-05-06 DIAGNOSIS — Z00.00 MEDICARE ANNUAL WELLNESS VISIT, SUBSEQUENT: Primary | ICD-10-CM

## 2025-05-06 PROCEDURE — G0439 PPPS, SUBSEQ VISIT: HCPCS | Performed by: FAMILY MEDICINE

## 2025-05-06 PROCEDURE — 3074F SYST BP LT 130 MM HG: CPT | Performed by: FAMILY MEDICINE

## 2025-05-06 PROCEDURE — 1159F MED LIST DOCD IN RCRD: CPT | Performed by: FAMILY MEDICINE

## 2025-05-06 PROCEDURE — 1123F ACP DISCUSS/DSCN MKR DOCD: CPT | Performed by: FAMILY MEDICINE

## 2025-05-06 PROCEDURE — 3078F DIAST BP <80 MM HG: CPT | Performed by: FAMILY MEDICINE

## 2025-05-06 RX ORDER — INSULIN GLARGINE 100 [IU]/ML
INJECTION, SOLUTION SUBCUTANEOUS
COMMUNITY
Start: 2025-03-14

## 2025-05-06 NOTE — PROGRESS NOTES
Conjunctiva/sclera: Conjunctivae normal.      Pupils: Pupils are equal, round, and reactive to light.   Neck:      Vascular: No carotid bruit.   Cardiovascular:      Rate and Rhythm: Normal rate and regular rhythm.      Pulses: Normal pulses.      Heart sounds: Normal heart sounds. No murmur heard.     No gallop.   Pulmonary:      Effort: Pulmonary effort is normal.      Breath sounds: Normal breath sounds.   Abdominal:      General: Bowel sounds are normal. There is distension.      Palpations: Abdomen is soft. There is no mass.      Tenderness: There is no abdominal tenderness.   Musculoskeletal:      Cervical back: Normal range of motion and neck supple. No tenderness.      Right lower leg: No edema.      Left lower leg: No edema.      Comments: Mild swelling he is wearing support stockings.  Decreased range of motion of both knees   Lymphadenopathy:      Cervical: No cervical adenopathy.   Skin:     General: Skin is warm and dry.      Coloration: Skin is not jaundiced or pale.   Neurological:      General: No focal deficit present.      Mental Status: He is alert and oriented to person, place, and time. Mental status is at baseline.   Psychiatric:         Mood and Affect: Mood normal.         Behavior: Behavior normal.         Thought Content: Thought content normal.         Judgment: Judgment normal.            No Known Allergies  Prior to Visit Medications    Medication Sig Taking? Authorizing Provider   LANDARBY SOLOSTAR 100 UNIT/ML injection pen  Yes Wilbur Morgan MD   fenofibrate 160 MG tablet Take 1 tablet by mouth daily Yes Maulik Cowan MD   sodium bicarbonate 650 MG tablet Take 1 tablet by mouth 3 times daily Yes Wilbur Morgan MD   amLODIPine (NORVASC) 5 MG tablet Take 1 tablet by mouth daily Yes Maulik Cowan MD   ergocalciferol (ERGOCALCIFEROL) 1.25 MG (38157 UT) capsule Take 1 capsule by mouth once a week On wednesdays Yes Maulik Cowan MD   lisinopril (PRINIVIL;ZESTRIL) 10 MG tablet Take

## 2025-05-06 NOTE — PATIENT INSTRUCTIONS

## 2025-06-19 ENCOUNTER — HOSPITAL ENCOUNTER (OUTPATIENT)
Dept: SLEEP CENTER | Age: 85
Discharge: HOME | End: 2025-06-19
Payer: MEDICARE

## 2025-06-19 DIAGNOSIS — G47.33 OSA ON CPAP: ICD-10-CM

## 2025-06-19 PROCEDURE — 95811 POLYSOM 6/>YRS CPAP 4/> PARM: CPT

## 2025-07-14 ENCOUNTER — OFFICE VISIT (OUTPATIENT)
Age: 85
End: 2025-07-14
Payer: MEDICARE

## 2025-07-14 ENCOUNTER — TELEPHONE (OUTPATIENT)
Age: 85
End: 2025-07-14

## 2025-07-14 VITALS
TEMPERATURE: 97.4 F | HEIGHT: 66 IN | OXYGEN SATURATION: 98 % | HEART RATE: 65 BPM | RESPIRATION RATE: 18 BRPM | SYSTOLIC BLOOD PRESSURE: 110 MMHG | WEIGHT: 253 LBS | BODY MASS INDEX: 40.66 KG/M2 | DIASTOLIC BLOOD PRESSURE: 70 MMHG

## 2025-07-14 DIAGNOSIS — I25.10 ATHEROSCLEROSIS OF NATIVE CORONARY ARTERY OF NATIVE HEART WITHOUT ANGINA PECTORIS: ICD-10-CM

## 2025-07-14 DIAGNOSIS — N18.4 CHRONIC KIDNEY DISEASE, STAGE IV (SEVERE) (HCC): ICD-10-CM

## 2025-07-14 DIAGNOSIS — I50.9 ACUTE ON CHRONIC CONGESTIVE HEART FAILURE, UNSPECIFIED HEART FAILURE TYPE (HCC): Primary | ICD-10-CM

## 2025-07-14 DIAGNOSIS — G47.33 OSA (OBSTRUCTIVE SLEEP APNEA): ICD-10-CM

## 2025-07-14 PROCEDURE — 1123F ACP DISCUSS/DSCN MKR DOCD: CPT | Performed by: FAMILY MEDICINE

## 2025-07-14 PROCEDURE — 3078F DIAST BP <80 MM HG: CPT | Performed by: FAMILY MEDICINE

## 2025-07-14 PROCEDURE — 1036F TOBACCO NON-USER: CPT | Performed by: FAMILY MEDICINE

## 2025-07-14 PROCEDURE — 1159F MED LIST DOCD IN RCRD: CPT | Performed by: FAMILY MEDICINE

## 2025-07-14 PROCEDURE — G8427 DOCREV CUR MEDS BY ELIG CLIN: HCPCS | Performed by: FAMILY MEDICINE

## 2025-07-14 PROCEDURE — 99214 OFFICE O/P EST MOD 30 MIN: CPT | Performed by: FAMILY MEDICINE

## 2025-07-14 PROCEDURE — G8417 CALC BMI ABV UP PARAM F/U: HCPCS | Performed by: FAMILY MEDICINE

## 2025-07-14 PROCEDURE — 3074F SYST BP LT 130 MM HG: CPT | Performed by: FAMILY MEDICINE

## 2025-07-14 ASSESSMENT — ENCOUNTER SYMPTOMS: SHORTNESS OF BREATH: 1

## 2025-07-14 NOTE — ASSESSMENT & PLAN NOTE
Monitored by specialist- no acute findings meriting change in the plan  He has an upcoming appointment with his nephrologist will obtain blood work

## 2025-07-14 NOTE — ASSESSMENT & PLAN NOTE
Monitored by specialist- no acute findings meriting change in the plan  Follows with pulmonology recent sleep study was done there giving him a new machine due to recent study which showed worsening of his sleep apnea told him and his wife this may be impacting his heart also continue close follow-up with pulmonology

## 2025-07-14 NOTE — ASSESSMENT & PLAN NOTE
Suspect CHF he is currently on Lasix 40 once a day will increase to twice a day and follow him back up on Friday told to watch his salt intake.  Will refer him to local cardiology also CHF clinic.  Last 2D echo was 2021 I suspect he needs another.  Also has blood work from his nephrologist he is going to get this week we will add a BNP    Orders:    Brain Natriuretic Peptide; Future    Rashad Maradiaga MD, Cardiology, [place]    [facility]

## 2025-07-14 NOTE — PROGRESS NOTES
Thee Fountain (:  1940) is a 85 y.o. male,Established patient, here for evaluation of the following chief complaint(s):  Discuss Medications         Assessment & Plan  Acute on chronic congestive heart failure, unspecified heart failure type (HCC)   Suspect CHF he is currently on Lasix 40 once a day will increase to twice a day and follow him back up on Friday told to watch his salt intake.  Will refer him to local cardiology also CHF clinic.  Last 2D echo was  I suspect he needs another.  Also has blood work from his nephrologist he is going to get this week we will add a BNP    Orders:    Brain Natriuretic Peptide; Future    Rashad Maradiaga MD, Cardiology, [facility], [place]    Atherosclerosis of native coronary artery of native heart without angina pectoris   Currently denies any chest pain.  Is having increasing shortness of breath leg edema and weight gain.  Was seen [facility] cardiologist but mainly remotely we will get him scheduled with cardiology in [facility]         Chronic kidney disease, stage IV (severe) (HCC)   Monitored by specialist- no acute findings meriting change in the plan  He has an upcoming appointment with his nephrologist will obtain blood work       OMARI (obstructive sleep apnea)   Monitored by specialist- no acute findings meriting change in the plan  Follows with pulmonology recent sleep study was done there giving him a new machine due to recent study which showed worsening of his sleep apnea told him and his wife this may be impacting his heart also continue close follow-up with pulmonology         Return in about 4 days (around 2025).       Subjective   HPI  85-year-old with known coronary artery disease managed medically previous paroxysmal A-fib chronic kidney disease diabetes and sleep apnea comes in due to increasing weight gain leg edema and shortness of breath.  Back in 2021 was admitted initially with PRABHU

## 2025-07-15 DIAGNOSIS — I50.9 ACUTE ON CHRONIC CONGESTIVE HEART FAILURE, UNSPECIFIED HEART FAILURE TYPE (HCC): ICD-10-CM

## 2025-07-15 LAB
NT PRO BNP: ABNORMAL PG/ML (ref 0–450)
T4 FREE SERPL-MCNC: 1.4 NG/DL (ref 0.9–1.7)
TSH SERPL DL<=0.05 MIU/L-ACNC: 4.1 UIU/ML (ref 0.27–4.2)

## 2025-07-16 ENCOUNTER — TELEPHONE (OUTPATIENT)
Dept: OTHER | Age: 85
End: 2025-07-16

## 2025-07-16 ENCOUNTER — TELEPHONE (OUTPATIENT)
Dept: CARDIOLOGY CLINIC | Age: 85
End: 2025-07-16

## 2025-07-16 NOTE — TELEPHONE ENCOUNTER
Called patient and scheduled appointment.  Scheduled for 8/19/25 due to not wanting early appointments.

## 2025-07-16 NOTE — TELEPHONE ENCOUNTER
Patient Appointment Form:   scheduled from referral        PCP: Dr Cowan  Referring: Dr Cowan     Has the Patient:     Seen a Cardiologist? Yes    date: 12/19/23  Physician: Dr Layton  location: [facility]     Had a heart catheterization? Yes  date: 10/20/21  Hospital:  [facility]     Had heart surgery? No     Had a stress test or nuclear stress test? Yes   date: unknown  facility name: unknown     Had an echocardiogram? Yes   date: 10/14/2021   facility name: [facility]    Had a vascular ultrasound? No     Had a 24/48 heart monitor or extended cardiac event monitor? No     Had recent blood work in the last 6 months? Yes    date: 7/15/25    ordering physician: Dr. Schultz     Had a pacemaker/ICD/ILR implant? No     Seen an Electrophysiologist? Yes   date: 12/19/23   physician: Dr. Arnold   location: [facility]      Had a cardiac ablation?  No       Will send records via: in Epic        Date & time of appointment:  7/24/25 12:00pm Dr Ricks

## 2025-07-17 ENCOUNTER — HOSPITAL ENCOUNTER (INPATIENT)
Age: 85
LOS: 7 days | Discharge: HOSPICE | DRG: 291 | End: 2025-07-24
Attending: EMERGENCY MEDICINE | Admitting: STUDENT IN AN ORGANIZED HEALTH CARE EDUCATION/TRAINING PROGRAM
Payer: MEDICARE

## 2025-07-17 ENCOUNTER — APPOINTMENT (OUTPATIENT)
Dept: ULTRASOUND IMAGING | Age: 85
DRG: 291 | End: 2025-07-17
Payer: MEDICARE

## 2025-07-17 ENCOUNTER — HOSPITAL ENCOUNTER (OUTPATIENT)
Dept: OTHER | Age: 85
Discharge: HOME | End: 2025-07-17

## 2025-07-17 ENCOUNTER — APPOINTMENT (OUTPATIENT)
Dept: GENERAL RADIOLOGY | Age: 85
DRG: 291 | End: 2025-07-17
Payer: MEDICARE

## 2025-07-17 DIAGNOSIS — I50.9 ACUTE EXACERBATION OF CHRONIC HEART FAILURE (HCC): Primary | ICD-10-CM

## 2025-07-17 DIAGNOSIS — E83.41 HYPERMAGNESEMIA: ICD-10-CM

## 2025-07-17 DIAGNOSIS — I50.9 CONGESTIVE HEART FAILURE, UNSPECIFIED HF CHRONICITY, UNSPECIFIED HEART FAILURE TYPE (HCC): ICD-10-CM

## 2025-07-17 DIAGNOSIS — Z51.5 PALLIATIVE CARE ENCOUNTER: ICD-10-CM

## 2025-07-17 DIAGNOSIS — N17.9 ACUTE RENAL FAILURE, UNSPECIFIED ACUTE RENAL FAILURE TYPE: ICD-10-CM

## 2025-07-17 DIAGNOSIS — Z71.89 GOALS OF CARE, COUNSELING/DISCUSSION: ICD-10-CM

## 2025-07-17 DIAGNOSIS — E87.70 HYPERVOLEMIA, UNSPECIFIED HYPERVOLEMIA TYPE: ICD-10-CM

## 2025-07-17 DIAGNOSIS — I50.23 ACUTE ON CHRONIC SYSTOLIC CONGESTIVE HEART FAILURE (HCC): ICD-10-CM

## 2025-07-17 DIAGNOSIS — R06.02 SHORTNESS OF BREATH: ICD-10-CM

## 2025-07-17 DIAGNOSIS — I50.9 ACUTE CONGESTIVE HEART FAILURE, UNSPECIFIED HEART FAILURE TYPE (HCC): Primary | ICD-10-CM

## 2025-07-17 PROBLEM — N18.4 CHRONIC KIDNEY DISEASE, STAGE IV (SEVERE) (HCC): Chronic | Status: ACTIVE | Noted: 2023-05-17

## 2025-07-17 PROBLEM — I10 ESSENTIAL HYPERTENSION, BENIGN: Chronic | Status: ACTIVE | Noted: 2019-09-19

## 2025-07-17 PROBLEM — E66.01 MORBID OBESITY WITH BODY MASS INDEX (BMI) OF 40.0 TO 44.9 IN ADULT (HCC): Chronic | Status: ACTIVE | Noted: 2024-05-07

## 2025-07-17 PROBLEM — I25.5 ISCHEMIC CARDIOMYOPATHY: Status: ACTIVE | Noted: 2025-07-17

## 2025-07-17 PROBLEM — I48.0 PAROXYSMAL ATRIAL FIBRILLATION (HCC): Chronic | Status: ACTIVE | Noted: 2024-05-07

## 2025-07-17 PROBLEM — C61 PROSTATE CANCER (HCC): Chronic | Status: ACTIVE | Noted: 2024-10-17

## 2025-07-17 PROBLEM — N18.9 ACUTE KIDNEY INJURY SUPERIMPOSED ON CHRONIC KIDNEY DISEASE: Status: ACTIVE | Noted: 2021-10-11

## 2025-07-17 PROBLEM — D63.1 ANEMIA DUE TO CHRONIC KIDNEY DISEASE: Chronic | Status: ACTIVE | Noted: 2025-07-17

## 2025-07-17 PROBLEM — I48.21 PERMANENT ATRIAL FIBRILLATION (HCC): Status: ACTIVE | Noted: 2025-07-17

## 2025-07-17 PROBLEM — N18.9 ANEMIA DUE TO CHRONIC KIDNEY DISEASE: Chronic | Status: ACTIVE | Noted: 2025-07-17

## 2025-07-17 PROBLEM — G47.33 OSA (OBSTRUCTIVE SLEEP APNEA): Chronic | Status: ACTIVE | Noted: 2024-05-07

## 2025-07-17 LAB
ALBUMIN SERPL-MCNC: 3.8 G/DL (ref 3.5–5.2)
ALP SERPL-CCNC: 80 U/L (ref 40–129)
ALT SERPL-CCNC: 11 U/L (ref 0–50)
ANION GAP SERPL CALCULATED.3IONS-SCNC: 15 MMOL/L (ref 7–16)
AST SERPL-CCNC: 31 U/L (ref 0–50)
B PARAP IS1001 DNA NPH QL NAA+NON-PROBE: NOT DETECTED
B PERT DNA SPEC QL NAA+PROBE: NOT DETECTED
BASOPHILS # BLD: 0.04 K/UL (ref 0–0.2)
BASOPHILS NFR BLD: 1 % (ref 0–2)
BILIRUB SERPL-MCNC: 0.4 MG/DL (ref 0–1.2)
BILIRUB UR QL STRIP: NEGATIVE
BNP SERPL-MCNC: 8786 PG/ML (ref 0–450)
BUN SERPL-MCNC: 81 MG/DL (ref 8–23)
C PNEUM DNA NPH QL NAA+NON-PROBE: NOT DETECTED
CALCIUM SERPL-MCNC: 9.8 MG/DL (ref 8.8–10.2)
CHLORIDE SERPL-SCNC: 101 MMOL/L (ref 98–107)
CHLORIDE UR-SCNC: 77 MMOL/L
CLARITY UR: CLEAR
CO2 SERPL-SCNC: 22 MMOL/L (ref 22–29)
COLOR UR: YELLOW
CREAT SERPL-MCNC: 3.3 MG/DL (ref 0.7–1.2)
CREAT UR-MCNC: 53.3 MG/DL (ref 40–278)
EOSINOPHIL # BLD: 0.52 K/UL (ref 0.05–0.5)
EOSINOPHILS RELATIVE PERCENT: 7 % (ref 0–6)
EPI CELLS #/AREA URNS HPF: ABNORMAL /HPF
ERYTHROCYTE [DISTWIDTH] IN BLOOD BY AUTOMATED COUNT: 14.7 % (ref 11.5–15)
FERRITIN SERPL-MCNC: 23 NG/ML
FLUAV RNA NPH QL NAA+NON-PROBE: NOT DETECTED
FLUBV RNA NPH QL NAA+NON-PROBE: NOT DETECTED
GFR, ESTIMATED: 18 ML/MIN/1.73M2
GLUCOSE BLD-MCNC: 100 MG/DL (ref 74–99)
GLUCOSE BLD-MCNC: 178 MG/DL (ref 74–99)
GLUCOSE SERPL-MCNC: 136 MG/DL (ref 74–99)
GLUCOSE UR STRIP-MCNC: 100 MG/DL
HADV DNA NPH QL NAA+NON-PROBE: NOT DETECTED
HCOV 229E RNA NPH QL NAA+NON-PROBE: NOT DETECTED
HCOV HKU1 RNA NPH QL NAA+NON-PROBE: NOT DETECTED
HCOV NL63 RNA NPH QL NAA+NON-PROBE: NOT DETECTED
HCOV OC43 RNA NPH QL NAA+NON-PROBE: NOT DETECTED
HCT VFR BLD AUTO: 29.3 % (ref 37–54)
HGB BLD-MCNC: 9.1 G/DL (ref 12.5–16.5)
HGB UR QL STRIP.AUTO: NEGATIVE
HMPV RNA NPH QL NAA+NON-PROBE: NOT DETECTED
HPIV1 RNA NPH QL NAA+NON-PROBE: NOT DETECTED
HPIV2 RNA NPH QL NAA+NON-PROBE: NOT DETECTED
HPIV3 RNA NPH QL NAA+NON-PROBE: NOT DETECTED
HPIV4 RNA NPH QL NAA+NON-PROBE: NOT DETECTED
IMM GRANULOCYTES # BLD AUTO: 0.03 K/UL (ref 0–0.58)
IMM GRANULOCYTES NFR BLD: 0 % (ref 0–5)
IRON SATN MFR SERPL: 6 % (ref 20–55)
IRON SERPL-MCNC: 29 UG/DL (ref 61–157)
KETONES UR STRIP-MCNC: NEGATIVE MG/DL
LEUKOCYTE ESTERASE UR QL STRIP: NEGATIVE
LYMPHOCYTES NFR BLD: 1.2 K/UL (ref 1.5–4)
LYMPHOCYTES RELATIVE PERCENT: 16 % (ref 20–42)
M PNEUMO DNA NPH QL NAA+NON-PROBE: NOT DETECTED
MAGNESIUM SERPL-MCNC: 2.9 MG/DL (ref 1.6–2.4)
MCH RBC QN AUTO: 25.2 PG (ref 26–35)
MCHC RBC AUTO-ENTMCNC: 31.1 G/DL (ref 32–34.5)
MCV RBC AUTO: 81.2 FL (ref 80–99.9)
MONOCYTES NFR BLD: 0.72 K/UL (ref 0.1–0.95)
MONOCYTES NFR BLD: 10 % (ref 2–12)
NEUTROPHILS NFR BLD: 67 % (ref 43–80)
NEUTS SEG NFR BLD: 5.04 K/UL (ref 1.8–7.3)
NITRITE UR QL STRIP: NEGATIVE
PH UR STRIP: 6 [PH] (ref 5–8)
PLATELET # BLD AUTO: 202 K/UL (ref 130–450)
PMV BLD AUTO: 11.8 FL (ref 7–12)
POTASSIUM SERPL-SCNC: 4.5 MMOL/L (ref 3.5–5.1)
PROT SERPL-MCNC: 7.3 G/DL (ref 6.4–8.3)
PROT UR STRIP-MCNC: NEGATIVE MG/DL
RBC # BLD AUTO: 3.61 M/UL (ref 3.8–5.8)
RBC #/AREA URNS HPF: ABNORMAL /HPF
RSV RNA NPH QL NAA+NON-PROBE: NOT DETECTED
RV+EV RNA NPH QL NAA+NON-PROBE: NOT DETECTED
SARS-COV-2 RNA NPH QL NAA+NON-PROBE: NOT DETECTED
SODIUM SERPL-SCNC: 138 MMOL/L (ref 136–145)
SODIUM UR-SCNC: 69 MMOL/L
SP GR UR STRIP: 1.01 (ref 1–1.03)
SPECIMEN DESCRIPTION: NORMAL
TIBC SERPL-MCNC: 522 UG/DL (ref 250–450)
TROPONIN I SERPL HS-MCNC: 49 NG/L (ref 0–22)
TROPONIN I SERPL HS-MCNC: 50 NG/L (ref 0–22)
TSH SERPL DL<=0.05 MIU/L-ACNC: 3.39 UIU/ML (ref 0.27–4.2)
UROBILINOGEN UR STRIP-ACNC: 0.2 EU/DL (ref 0–1)
UUN UR-MCNC: 308 MG/DL (ref 800–1666)
WBC #/AREA URNS HPF: ABNORMAL /HPF
WBC OTHER # BLD: 7.6 K/UL (ref 4.5–11.5)

## 2025-07-17 PROCEDURE — 80053 COMPREHEN METABOLIC PANEL: CPT

## 2025-07-17 PROCEDURE — 99223 1ST HOSP IP/OBS HIGH 75: CPT | Performed by: INTERNAL MEDICINE

## 2025-07-17 PROCEDURE — 84300 ASSAY OF URINE SODIUM: CPT

## 2025-07-17 PROCEDURE — 6370000000 HC RX 637 (ALT 250 FOR IP)

## 2025-07-17 PROCEDURE — 99285 EMERGENCY DEPT VISIT HI MDM: CPT

## 2025-07-17 PROCEDURE — 82570 ASSAY OF URINE CREATININE: CPT

## 2025-07-17 PROCEDURE — 83550 IRON BINDING TEST: CPT

## 2025-07-17 PROCEDURE — 81001 URINALYSIS AUTO W/SCOPE: CPT

## 2025-07-17 PROCEDURE — 2580000003 HC RX 258: Performed by: INTERNAL MEDICINE

## 2025-07-17 PROCEDURE — 83540 ASSAY OF IRON: CPT

## 2025-07-17 PROCEDURE — 84166 PROTEIN E-PHORESIS/URINE/CSF: CPT

## 2025-07-17 PROCEDURE — 83880 ASSAY OF NATRIURETIC PEPTIDE: CPT

## 2025-07-17 PROCEDURE — 82962 GLUCOSE BLOOD TEST: CPT

## 2025-07-17 PROCEDURE — 84484 ASSAY OF TROPONIN QUANT: CPT

## 2025-07-17 PROCEDURE — 84156 ASSAY OF PROTEIN URINE: CPT

## 2025-07-17 PROCEDURE — 2500000003 HC RX 250 WO HCPCS

## 2025-07-17 PROCEDURE — 85025 COMPLETE CBC W/AUTO DIFF WBC: CPT

## 2025-07-17 PROCEDURE — 84540 ASSAY OF URINE/UREA-N: CPT

## 2025-07-17 PROCEDURE — 6360000002 HC RX W HCPCS: Performed by: INTERNAL MEDICINE

## 2025-07-17 PROCEDURE — 51798 US URINE CAPACITY MEASURE: CPT

## 2025-07-17 PROCEDURE — 86335 IMMUNFIX E-PHORSIS/URINE/CSF: CPT

## 2025-07-17 PROCEDURE — 93005 ELECTROCARDIOGRAM TRACING: CPT | Performed by: EMERGENCY MEDICINE

## 2025-07-17 PROCEDURE — 84443 ASSAY THYROID STIM HORMONE: CPT

## 2025-07-17 PROCEDURE — 99223 1ST HOSP IP/OBS HIGH 75: CPT | Performed by: STUDENT IN AN ORGANIZED HEALTH CARE EDUCATION/TRAINING PROGRAM

## 2025-07-17 PROCEDURE — 71045 X-RAY EXAM CHEST 1 VIEW: CPT

## 2025-07-17 PROCEDURE — APPSS60 APP SPLIT SHARED TIME 46-60 MINUTES

## 2025-07-17 PROCEDURE — 82436 ASSAY OF URINE CHLORIDE: CPT

## 2025-07-17 PROCEDURE — 1200000000 HC SEMI PRIVATE

## 2025-07-17 PROCEDURE — 82728 ASSAY OF FERRITIN: CPT

## 2025-07-17 PROCEDURE — 83735 ASSAY OF MAGNESIUM: CPT

## 2025-07-17 PROCEDURE — 0202U NFCT DS 22 TRGT SARS-COV-2: CPT

## 2025-07-17 RX ORDER — ALBUMIN (HUMAN) 12.5 G/50ML
25 SOLUTION INTRAVENOUS ONCE
Status: DISCONTINUED | OUTPATIENT
Start: 2025-07-17 | End: 2025-07-17

## 2025-07-17 RX ORDER — DEXTROSE MONOHYDRATE 100 MG/ML
INJECTION, SOLUTION INTRAVENOUS CONTINUOUS PRN
Status: DISCONTINUED | OUTPATIENT
Start: 2025-07-17 | End: 2025-07-24 | Stop reason: HOSPADM

## 2025-07-17 RX ORDER — HEPARIN SODIUM 5000 [USP'U]/ML
5000 INJECTION, SOLUTION INTRAVENOUS; SUBCUTANEOUS EVERY 8 HOURS SCHEDULED
Status: DISCONTINUED | OUTPATIENT
Start: 2025-07-17 | End: 2025-07-17

## 2025-07-17 RX ORDER — ROSUVASTATIN CALCIUM 20 MG/1
20 TABLET, COATED ORAL DAILY
Status: DISCONTINUED | OUTPATIENT
Start: 2025-07-18 | End: 2025-07-21 | Stop reason: DRUGHIGH

## 2025-07-17 RX ORDER — POLYETHYLENE GLYCOL 3350 17 G/17G
17 POWDER, FOR SOLUTION ORAL DAILY PRN
Status: DISCONTINUED | OUTPATIENT
Start: 2025-07-17 | End: 2025-07-24 | Stop reason: HOSPADM

## 2025-07-17 RX ORDER — AMLODIPINE BESYLATE 5 MG/1
5 TABLET ORAL DAILY
Status: DISCONTINUED | OUTPATIENT
Start: 2025-07-18 | End: 2025-07-18

## 2025-07-17 RX ORDER — SODIUM CHLORIDE 0.9 % (FLUSH) 0.9 %
5-40 SYRINGE (ML) INJECTION EVERY 12 HOURS SCHEDULED
Status: DISCONTINUED | OUTPATIENT
Start: 2025-07-17 | End: 2025-07-24 | Stop reason: HOSPADM

## 2025-07-17 RX ORDER — BUMETANIDE 0.25 MG/ML
2 INJECTION, SOLUTION INTRAMUSCULAR; INTRAVENOUS ONCE
Status: COMPLETED | OUTPATIENT
Start: 2025-07-17 | End: 2025-07-17

## 2025-07-17 RX ORDER — GLUCAGON 1 MG/ML
1 KIT INJECTION PRN
Status: DISCONTINUED | OUTPATIENT
Start: 2025-07-17 | End: 2025-07-24 | Stop reason: HOSPADM

## 2025-07-17 RX ORDER — INSULIN LISPRO 100 [IU]/ML
0-4 INJECTION, SOLUTION INTRAVENOUS; SUBCUTANEOUS
Status: DISCONTINUED | OUTPATIENT
Start: 2025-07-17 | End: 2025-07-24 | Stop reason: HOSPADM

## 2025-07-17 RX ORDER — ONDANSETRON 2 MG/ML
4 INJECTION INTRAMUSCULAR; INTRAVENOUS EVERY 6 HOURS PRN
Status: DISCONTINUED | OUTPATIENT
Start: 2025-07-17 | End: 2025-07-24 | Stop reason: HOSPADM

## 2025-07-17 RX ORDER — BUMETANIDE 0.25 MG/ML
2 INJECTION, SOLUTION INTRAMUSCULAR; INTRAVENOUS 2 TIMES DAILY
Status: DISCONTINUED | OUTPATIENT
Start: 2025-07-17 | End: 2025-07-17

## 2025-07-17 RX ORDER — ONDANSETRON 4 MG/1
4 TABLET, ORALLY DISINTEGRATING ORAL EVERY 8 HOURS PRN
Status: DISCONTINUED | OUTPATIENT
Start: 2025-07-17 | End: 2025-07-24 | Stop reason: HOSPADM

## 2025-07-17 RX ORDER — SODIUM CHLORIDE 9 MG/ML
INJECTION, SOLUTION INTRAVENOUS PRN
Status: DISCONTINUED | OUTPATIENT
Start: 2025-07-17 | End: 2025-07-24 | Stop reason: HOSPADM

## 2025-07-17 RX ORDER — ACETAMINOPHEN 325 MG/1
650 TABLET ORAL EVERY 6 HOURS PRN
Status: DISCONTINUED | OUTPATIENT
Start: 2025-07-17 | End: 2025-07-24 | Stop reason: HOSPADM

## 2025-07-17 RX ORDER — INSULIN GLARGINE 100 [IU]/ML
15 INJECTION, SOLUTION SUBCUTANEOUS 2 TIMES DAILY
Status: DISCONTINUED | OUTPATIENT
Start: 2025-07-17 | End: 2025-07-24 | Stop reason: HOSPADM

## 2025-07-17 RX ORDER — SODIUM CHLORIDE 0.9 % (FLUSH) 0.9 %
5-40 SYRINGE (ML) INJECTION PRN
Status: DISCONTINUED | OUTPATIENT
Start: 2025-07-17 | End: 2025-07-24 | Stop reason: HOSPADM

## 2025-07-17 RX ORDER — ACETAMINOPHEN 650 MG/1
650 SUPPOSITORY RECTAL EVERY 6 HOURS PRN
Status: DISCONTINUED | OUTPATIENT
Start: 2025-07-17 | End: 2025-07-24 | Stop reason: HOSPADM

## 2025-07-17 RX ORDER — LISINOPRIL 10 MG/1
10 TABLET ORAL DAILY
Status: DISCONTINUED | OUTPATIENT
Start: 2025-07-17 | End: 2025-07-23

## 2025-07-17 RX ADMIN — SODIUM CHLORIDE, PRESERVATIVE FREE 10 ML: 5 INJECTION INTRAVENOUS at 20:56

## 2025-07-17 RX ADMIN — BUMETANIDE 0.2 MG/HR: 0.25 INJECTION INTRAMUSCULAR; INTRAVENOUS at 17:55

## 2025-07-17 RX ADMIN — BUMETANIDE 2 MG: 0.25 INJECTION INTRAMUSCULAR; INTRAVENOUS at 18:01

## 2025-07-17 RX ADMIN — INSULIN GLARGINE 15 UNITS: 100 INJECTION, SOLUTION SUBCUTANEOUS at 21:05

## 2025-07-17 ASSESSMENT — LIFESTYLE VARIABLES
HOW OFTEN DO YOU HAVE A DRINK CONTAINING ALCOHOL: NEVER
HOW MANY STANDARD DRINKS CONTAINING ALCOHOL DO YOU HAVE ON A TYPICAL DAY: PATIENT DOES NOT DRINK

## 2025-07-17 ASSESSMENT — PAIN - FUNCTIONAL ASSESSMENT: PAIN_FUNCTIONAL_ASSESSMENT: 0-10

## 2025-07-17 ASSESSMENT — PAIN SCALES - GENERAL
PAINLEVEL_OUTOF10: 0
PAINLEVEL_OUTOF10: 0

## 2025-07-17 NOTE — CONSULTS
Nephrology Consult Note  Patient's Name: Thee Fountain  4:26 PM  7/17/2025    Nephrologist: REGLA Pina MD    Reason for Consult:  SAURABH on CKD G4  Requesting Physician:  Dr. ANNI Saravia    Chief Complaint:  SOB    History Obtained From:  patient and past medical records    History of Present Ilness:    Thee Fountain is a 85 y.o. male with prior history of CKD G4 with a baseline serum cr 2.2-2.4mg/dl with an assoc e-GFR=29-27ml/min presumed sec to microvascular disease in the setting of DM2 and HTN. Pt states he has worsening SOB and LEONARD. He has no fever or chills. He notes he has ongoing wt gain. His usual wt is 236# and now wt up to 253#. He states he was on 80mg bid of furosemide po without improvement and this AM took 120mg but still has not had good UO. He notes he was treated for prostate cancer in the past with 42 radiation therapies and since then his bladder has \" shrank\" and he has some urinary hesitancy. He denied dysuria or hematuria    Past Medical History:   Diagnosis Date    Acute myocardial infarction (HCC) 09/12/2023    Acute pancreatitis     Atrial fibrillation (HCC)     CAD (coronary artery disease)     CHF (congestive heart failure) (HCC)     Chronic back pain     Chronic kidney disease     Diabetes mellitus (HCC)     Erectile dysfunction     GERD (gastroesophageal reflux disease)     History of snoring     HTN (hypertension)     Hyperlipidemia     Obesity     Osteoarthritis     Preoperative clearance 08/29/2014    cardiac- Dr. Glover; note in [facility] for surgery 9/23/2014    Preoperative clearance 09/19/2014    medical clearance for OR 9-23-14 from Dr. MAURI Cowan on paper chart    Prostate cancer (HCC) 1999    radiation, seed implant    Renal insufficiency     Sleep apnea     Type 2 diabetes mellitus without complication (HCC)     Urinary incontinence        Past Surgical History:   Procedure Laterality Date    BELPHAROPTOSIS REPAIR Bilateral 01/23/2014    CARDIAC CATHETERIZATION      CATARACT

## 2025-07-17 NOTE — CONSULTS
Jimmy Murphy M.D.,[facility]  Jacky Somers D.O., LIA., [facility]  Regina Khan M.D.  Aixa Perez M.D.   Lisandro Colldao D.O.  Arjun Trinidad M.D.       Patient:  Thee Fountain 85 y.o. male MRN: [id number]           PULMONARY CONSULTATION    Reason for Consultation: OMARI, SOB    Communication with the referring physician will be sent via the electronic medical record.    Chief Complaint: SOB    CODE STATUS: FULL CODE    SUBJECTIVE:  HPI:  Thee Fountain is a 85 y.o. male with a PMH of MI, AF, CHF, CAD, CKD, OMARI on Pap therapy who came to the hospital reporting fluid retention that has been ongoing since July 4th. He is also reporting dyspnea with minimal exertion and a cough with occasional clear mucous. He has been needing to use his scooter more to get around and has been feeling low on energy. He took 80 mg PO lasix yesterday and 120 mg today with minimal urine output. CXR is unremarkable. Creatinine elevated at 3.3, baseline is around 2.1. ProBNP 8786 which is lower from 2 days ago when it was 78957. There is no leukocytosis and hemoglobin is 9.1. respiratory panel is pending.    \"Chief\" is known to our practice and sees Dr. Regina Khan for OMARI. He was utilizing an AutoCPAP 5-15 cm H2O but recently had a titration study that determined he required a BiPap 17/13 but has not yet received the new device through [facility].    \"Chief\" was seen today in the ED with his son-in-law and wife present. He is awake, alert an oriented and stable on room air with SpO2 95%. Legs do have some edema. Lungs clear on exam. He is able to speak in full sentences.      Past Medical History:   Diagnosis Date    Acute myocardial infarction (HCC) 09/12/2023    Acute pancreatitis     Atrial fibrillation (HCC)     CAD (coronary artery disease)     CHF (congestive heart failure) (HCC)     Chronic back pain     Chronic kidney disease     Diabetes mellitus (HCC)     Erectile dysfunction     GERD (gastroesophageal reflux disease)

## 2025-07-17 NOTE — CONSULTS
Inpatient Cardiology Consultation      Reason for Consult:  Heart failure     Consulting Physician: Dr. Myers     Requesting Physician:  Ju Jolly     Date of Consultation: 7/17/2025    History of Present Illness:   Thee Fountain  is a 85 y.o. year old male who previously followed with Dr. Layton 2021. Had a referral placed to Dr. Arciniega on 7/14/2025 as well as CHF clinic. He was started on lasix 40 mg daily.     Patient presented to the ED on 7/17/2025 with complaints of shortness of breath x 2 weeks.  On arrival blood pressure 119/51, heart rate 69, 97% on room air and afebrile.  Initial labs include sodium 138, potassium 4.5, BUN/creatinine 81/3.3, magnesium 2.9, troponin 50, proBNP 8786, WBC 7.6, Hgb 9.1.  Chest x-ray reveals no acute process.    Patient was seen and examined in the emergency department with family at the bedside.  Patient reports that after 4 July he began noticing increased lower extremity swelling, LEONARD and weight gain.  He states that since July 7 he has had approximately 12 pound weight gain according to his home scale.  He denies orthopnea, PND, lightheadedness, dizziness, chest pain, abdominal bloating.  He does admit to some decreased appetite.  He states that he did follow-up with his PCP 2 days ago for the increased lower extremity swelling and weight gain and he was referred to Dr. Arciniega as well as the CHF clinic.  His Lasix was increased from 40 mg daily to 40 mg twice daily.  He states that he was instructed yesterday to take 60 mg of Lasix total and then took another 60 mg of p.o. Lasix this morning.  He reports that he has not had much urine output despite the increased Lasix dose.  Patient reports that he previously followed with Dr. Layton and last echocardiogram was in 2021.  He states that he did go to the [facility] in 2021 as well.    Please note: past medical records were reviewed per electronic medical record (EMR) - see detailed reports under

## 2025-07-17 NOTE — ED PROVIDER NOTES
Emergency Department Encounter  [facility] EMERGENCY DEPARTMENT    Patient: Thee  MRN: [id number]  : 1940  Date of Evaluation: 2025  ED Supervising Physician: Mik Alfaro MD    I personally evaluated Thee Fountain and made/approved the management plan and take responsibility for the patient management.    This will serve as my Supervisory note and shared attestation.  I did perform a substantive portion of the visit including all aspects of the Medical Decision Making.    I wore appropriate PPE for the entirety of this encounter.    In brief, Thee is a 85 y.o. that presents to the emergency department worsening weight gain reported SAURABH elevated BNP concern for CHF.    Focused exam: Vitals are stable patient is awake alert well-appearing lungs largely clear to auscultation no significant cardiac murmur normal mental status    Brief ED course/MDM: Patient presenting with 12 to 14 pound weight gain over sounds like last 10 days.  He is more fatigued and more short of breath.  Here vitals are stable    Reportedly had a new SAURABH on outpatient lab work with creatinine of 3 baseline is closer to 2.  EKG personally interpreted shows possible junctional rhythm there is no clear P waves.  No signs of hyperkalemia and reviewed an EKG from 10/27/2021 in which she was in A-fib without clear P waves at that time    Concern for significant volume overload if he is released 12 pounds up likely needs IV diuresis on admission.  We consulted nephrology spoke with Dr. Fajardo.  Also spoke with Dr. Collado from pul ICU.  His vitals are stable he is in no distress will check lab work and then plan to admit    Diagnostics interpreted by me:      I personally discussed the patient's management with other clinicians:      All diagnostic, treatment, and disposition decisions were made by myself in conjunction with the Resident. I also supervised key portions of any procedures performed by the  Size Of Lesion In Cm (Optional): 0 Detail Level: Detailed Introduction Text (Please End With A Colon): The following procedure was deferred:

## 2025-07-17 NOTE — ED NOTES
ED to Inpatient Handoff Report    Notified Nando that electronic handoff available and patient ready for transport to room 532.    Safety Risks: Risk of falls    Patient in Restraints: no    Constant Observer or Patient : no    Telemetry Monitoring Ordered: Yes     Cardiac Rhythm: Atrial fib    Order to transfer to unit without monitor: NO    Last MEWS: 1 Time completed: 1615    Deterioration Index: 24.8    Vitals:    07/17/25 1439 07/17/25 1502 07/17/25 1511 07/17/25 1609   BP:   (!) 116/50 (!) 120/59   Pulse: 59 70 63 62   Resp: 18 23 17 21   Temp:       TempSrc:       SpO2: 96% 92% 98% 96%   Weight:       Height:           Opportunity for questions and clarification was provided.

## 2025-07-18 ENCOUNTER — APPOINTMENT (OUTPATIENT)
Dept: ULTRASOUND IMAGING | Age: 85
DRG: 291 | End: 2025-07-18
Payer: MEDICARE

## 2025-07-18 ENCOUNTER — APPOINTMENT (OUTPATIENT)
Age: 85
DRG: 291 | End: 2025-07-18
Payer: MEDICARE

## 2025-07-18 LAB
25(OH)D3 SERPL-MCNC: 58.5 NG/ML (ref 30–100)
ALBUMIN SERPL-MCNC: 3 G/DL (ref 3.5–4.7)
ALBUMIN SERPL-MCNC: 3.6 G/DL (ref 3.5–5.2)
ALP SERPL-CCNC: 73 U/L (ref 40–129)
ALPHA1 GLOB SERPL ELPH-MCNC: 0.3 G/DL (ref 0.2–0.4)
ALPHA2 GLOB SERPL ELPH-MCNC: 0.6 G/DL (ref 0.5–1)
ALT SERPL-CCNC: 10 U/L (ref 0–50)
ANION GAP SERPL CALCULATED.3IONS-SCNC: 16 MMOL/L (ref 7–16)
AST SERPL-CCNC: 30 U/L (ref 0–50)
B-GLOBULIN SERPL ELPH-MCNC: 1.4 G/DL (ref 0.8–1.3)
BASOPHILS # BLD: 0.05 K/UL (ref 0–0.2)
BASOPHILS NFR BLD: 1 % (ref 0–2)
BILIRUB DIRECT SERPL-MCNC: 0.2 MG/DL (ref 0–0.2)
BILIRUB INDIRECT SERPL-MCNC: 0.2 MG/DL (ref 0–1)
BILIRUB SERPL-MCNC: 0.4 MG/DL (ref 0–1.2)
BUN SERPL-MCNC: 82 MG/DL (ref 8–23)
CALCIUM SERPL-MCNC: 9.6 MG/DL (ref 8.8–10.2)
CHLORIDE SERPL-SCNC: 102 MMOL/L (ref 98–107)
CHOLEST SERPL-MCNC: 95 MG/DL
CO2 SERPL-SCNC: 21 MMOL/L (ref 22–29)
CREAT SERPL-MCNC: 3.2 MG/DL (ref 0.7–1.2)
ECHO AO ANNULUS INDEX: 1.19 CM/M2
ECHO AO ASC DIAM: 4 CM
ECHO AO ASCENDING AORTA INDEX: 1.83 CM/M2
ECHO AV ANNULUS DIAM: 2.6 CM
ECHO AV AREA PEAK VELOCITY: 1.6 CM2
ECHO AV AREA PLAN/BSA: 1.1 CM2/M2
ECHO AV AREA PLAN: 2.4 CM2
ECHO AV AREA VTI: 1.7 CM2
ECHO AV AREA/BSA PEAK VELOCITY: 0.7 CM2/M2
ECHO AV AREA/BSA VTI: 0.8 CM2/M2
ECHO AV CUSP MM: 1.4 CM
ECHO AV MEAN GRADIENT: 8 MMHG
ECHO AV MEAN VELOCITY: 1.4 M/S
ECHO AV PEAK GRADIENT: 14 MMHG
ECHO AV PEAK VELOCITY: 1.9 M/S
ECHO AV VELOCITY RATIO: 0.47
ECHO AV VTI: 40.1 CM
ECHO BSA: 2.3 M2
ECHO EST RA PRESSURE: 8 MMHG
ECHO LA DIAMETER INDEX: 2.69 CM/M2
ECHO LA DIAMETER: 5.9 CM
ECHO LA VOL A-L A2C: 86 ML (ref 18–58)
ECHO LA VOL A-L A4C: 101 ML (ref 18–58)
ECHO LA VOL MOD A2C: 82 ML (ref 18–58)
ECHO LA VOL MOD A4C: 95 ML (ref 18–58)
ECHO LA VOLUME AREA LENGTH: 94 ML
ECHO LA VOLUME INDEX A-L A2C: 39 ML/M2 (ref 16–34)
ECHO LA VOLUME INDEX A-L A4C: 46 ML/M2 (ref 16–34)
ECHO LA VOLUME INDEX AREA LENGTH: 43 ML/M2 (ref 16–34)
ECHO LA VOLUME INDEX MOD A2C: 37 ML/M2 (ref 16–34)
ECHO LA VOLUME INDEX MOD A4C: 43 ML/M2 (ref 16–34)
ECHO LV E' LATERAL VELOCITY: 7 CM/S
ECHO LV E' SEPTAL VELOCITY: 4 CM/S
ECHO LV EDV A2C: 198 ML
ECHO LV EDV A4C: 179 ML
ECHO LV EDV BP: 195 ML (ref 67–155)
ECHO LV EDV INDEX A4C: 82 ML/M2
ECHO LV EDV INDEX BP: 89 ML/M2
ECHO LV EDV NDEX A2C: 90 ML/M2
ECHO LV EF PHYSICIAN: 40 %
ECHO LV EJECTION FRACTION A2C: 42 %
ECHO LV EJECTION FRACTION A4C: 39 %
ECHO LV EJECTION FRACTION BIPLANE: 42 % (ref 55–100)
ECHO LV ESV A2C: 114 ML
ECHO LV ESV A4C: 108 ML
ECHO LV ESV BP: 112 ML (ref 22–58)
ECHO LV ESV INDEX A2C: 52 ML/M2
ECHO LV ESV INDEX A4C: 49 ML/M2
ECHO LV ESV INDEX BP: 51 ML/M2
ECHO LV FRACTIONAL SHORTENING: 24 % (ref 28–44)
ECHO LV INTERNAL DIMENSION DIASTOLE INDEX: 2.28 CM/M2
ECHO LV INTERNAL DIMENSION DIASTOLIC: 5 CM (ref 4.2–5.9)
ECHO LV INTERNAL DIMENSION SYSTOLIC INDEX: 1.74 CM/M2
ECHO LV INTERNAL DIMENSION SYSTOLIC: 3.8 CM
ECHO LV ISOVOLUMETRIC RELAXATION TIME (IVRT): 83 MS
ECHO LV IVSD: 1.6 CM (ref 0.6–1)
ECHO LV IVSS: 1.9 CM
ECHO LV MASS 2D: 322.6 G (ref 88–224)
ECHO LV MASS INDEX 2D: 147.3 G/M2 (ref 49–115)
ECHO LV POSTERIOR WALL DIASTOLIC: 1.4 CM (ref 0.6–1)
ECHO LV POSTERIOR WALL SYSTOLIC: 1.5 CM
ECHO LV RELATIVE WALL THICKNESS RATIO: 0.56
ECHO LVOT AREA: 3.1 CM2
ECHO LVOT AV VTI INDEX: 0.51
ECHO LVOT DIAM: 2 CM
ECHO LVOT MEAN GRADIENT: 2 MMHG
ECHO LVOT PEAK GRADIENT: 4 MMHG
ECHO LVOT PEAK VELOCITY: 0.9 M/S
ECHO LVOT STROKE VOLUME INDEX: 29.5 ML/M2
ECHO LVOT SV: 64.7 ML
ECHO LVOT VTI: 20.6 CM
ECHO MV "A" WAVE DURATION: 129.2 MSEC
ECHO MV A VELOCITY: 0.41 M/S
ECHO MV AREA PHT: 1.8 CM2
ECHO MV AREA VTI: 2.1 CM2
ECHO MV E DECELERATION TIME (DT): 195.1 MS
ECHO MV E VELOCITY: 0.96 M/S
ECHO MV E/A RATIO: 2.34
ECHO MV E/E' LATERAL: 13.71
ECHO MV E/E' RATIO (AVERAGED): 18.86
ECHO MV E/E' SEPTAL: 24
ECHO MV LVOT VTI INDEX: 1.51
ECHO MV MAX VELOCITY: 1 M/S
ECHO MV MEAN GRADIENT: 1 MMHG
ECHO MV MEAN VELOCITY: 0.5 M/S
ECHO MV PEAK GRADIENT: 4 MMHG
ECHO MV PRESSURE HALF TIME (PHT): 125 MS
ECHO MV VTI: 31.1 CM
ECHO PV MAX VELOCITY: 0.9 M/S
ECHO PV MEAN GRADIENT: 2 MMHG
ECHO PV MEAN VELOCITY: 0.7 M/S
ECHO PV PEAK GRADIENT: 3 MMHG
ECHO PV VTI: 17.9 CM
ECHO PVEIN PEAK D VELOCITY: 0.3 M/S
ECHO PVEIN PEAK S VELOCITY: 0.5 M/S
ECHO PVEIN S/D RATIO: 1.7
ECHO RIGHT VENTRICULAR SYSTOLIC PRESSURE (RVSP): 47 MMHG
ECHO RV BASAL DIMENSION: 4.8 CM
ECHO RV INTERNAL DIMENSION: 4.5 CM
ECHO RV LONGITUDINAL DIMENSION: 7.8 CM
ECHO RV MID DIMENSION: 4.4 CM
ECHO RV TAPSE: 1.6 CM (ref 1.7–?)
ECHO TV REGURGITANT MAX VELOCITY: 3.14 M/S
ECHO TV REGURGITANT PEAK GRADIENT: 40 MMHG
EKG ATRIAL RATE: 87 BPM
EKG Q-T INTERVAL: 396 MS
EKG QRS DURATION: 94 MS
EKG QTC CALCULATION (BAZETT): 439 MS
EKG R AXIS: -2 DEGREES
EKG T AXIS: 197 DEGREES
EKG VENTRICULAR RATE: 74 BPM
EOSINOPHIL # BLD: 0.41 K/UL (ref 0.05–0.5)
EOSINOPHILS RELATIVE PERCENT: 7 % (ref 0–6)
ERYTHROCYTE [DISTWIDTH] IN BLOOD BY AUTOMATED COUNT: 14.6 % (ref 11.5–15)
FOLATE SERPL-MCNC: 7.2 NG/ML (ref 4.6–34.8)
GAMMA GLOB SERPL ELPH-MCNC: 1.6 G/DL (ref 0.7–1.6)
GFR, ESTIMATED: 18 ML/MIN/1.73M2
GLUCOSE BLD-MCNC: 118 MG/DL (ref 74–99)
GLUCOSE BLD-MCNC: 120 MG/DL (ref 74–99)
GLUCOSE BLD-MCNC: 134 MG/DL (ref 74–99)
GLUCOSE BLD-MCNC: 96 MG/DL (ref 74–99)
GLUCOSE SERPL-MCNC: 84 MG/DL (ref 74–99)
HCT VFR BLD AUTO: 26.6 % (ref 37–54)
HDLC SERPL-MCNC: 32 MG/DL
HGB BLD-MCNC: 8.3 G/DL (ref 12.5–16.5)
IMM GRANULOCYTES # BLD AUTO: <0.03 K/UL (ref 0–0.58)
IMM GRANULOCYTES NFR BLD: 0 % (ref 0–5)
LDLC SERPL CALC-MCNC: 43 MG/DL
LYMPHOCYTES NFR BLD: 1.03 K/UL (ref 1.5–4)
LYMPHOCYTES RELATIVE PERCENT: 17 % (ref 20–42)
MAGNESIUM SERPL-MCNC: 2.9 MG/DL (ref 1.6–2.4)
MCH RBC QN AUTO: 24.9 PG (ref 26–35)
MCHC RBC AUTO-ENTMCNC: 31.2 G/DL (ref 32–34.5)
MCV RBC AUTO: 79.9 FL (ref 80–99.9)
MONOCYTES NFR BLD: 0.65 K/UL (ref 0.1–0.95)
MONOCYTES NFR BLD: 11 % (ref 2–12)
NEUTROPHILS NFR BLD: 65 % (ref 43–80)
NEUTS SEG NFR BLD: 3.96 K/UL (ref 1.8–7.3)
P E INTERPRETATION, U: NORMAL
PATHOLOGIST: ABNORMAL
PATHOLOGIST: NORMAL
PHOSPHATE SERPL-MCNC: 5 MG/DL (ref 2.5–4.5)
PLATELET # BLD AUTO: 175 K/UL (ref 130–450)
PMV BLD AUTO: 11.8 FL (ref 7–12)
POTASSIUM SERPL-SCNC: 4.3 MMOL/L (ref 3.5–5.1)
PROT PATTERN SERPL ELPH-IMP: ABNORMAL
PROT SERPL-MCNC: 6.8 G/DL (ref 6.4–8.3)
PROT SERPL-MCNC: 7 G/DL (ref 6.4–8.3)
RBC # BLD AUTO: 3.33 M/UL (ref 3.8–5.8)
SODIUM SERPL-SCNC: 138 MMOL/L (ref 136–145)
SPECIMEN TYPE: NORMAL
SPECIMEN TYPE: NORMAL
TRIGL SERPL-MCNC: 96 MG/DL
URINE IFX INTERP: NORMAL
VIT B12 SERPL-MCNC: 389 PG/ML (ref 232–1245)
VLDLC SERPL CALC-MCNC: 19 MG/DL
WBC OTHER # BLD: 6.1 K/UL (ref 4.5–11.5)

## 2025-07-18 PROCEDURE — 99233 SBSQ HOSP IP/OBS HIGH 50: CPT | Performed by: INTERNAL MEDICINE

## 2025-07-18 PROCEDURE — 83735 ASSAY OF MAGNESIUM: CPT

## 2025-07-18 PROCEDURE — 80061 LIPID PANEL: CPT

## 2025-07-18 PROCEDURE — 99233 SBSQ HOSP IP/OBS HIGH 50: CPT | Performed by: STUDENT IN AN ORGANIZED HEALTH CARE EDUCATION/TRAINING PROGRAM

## 2025-07-18 PROCEDURE — 2500000003 HC RX 250 WO HCPCS

## 2025-07-18 PROCEDURE — 82248 BILIRUBIN DIRECT: CPT

## 2025-07-18 PROCEDURE — 84165 PROTEIN E-PHORESIS SERUM: CPT

## 2025-07-18 PROCEDURE — 6360000002 HC RX W HCPCS: Performed by: INTERNAL MEDICINE

## 2025-07-18 PROCEDURE — 97165 OT EVAL LOW COMPLEX 30 MIN: CPT

## 2025-07-18 PROCEDURE — 76770 US EXAM ABDO BACK WALL COMP: CPT

## 2025-07-18 PROCEDURE — 82607 VITAMIN B-12: CPT

## 2025-07-18 PROCEDURE — 5A09357 ASSISTANCE WITH RESPIRATORY VENTILATION, LESS THAN 24 CONSECUTIVE HOURS, CONTINUOUS POSITIVE AIRWAY PRESSURE: ICD-10-PCS | Performed by: INTERNAL MEDICINE

## 2025-07-18 PROCEDURE — 82746 ASSAY OF FOLIC ACID SERUM: CPT

## 2025-07-18 PROCEDURE — 93010 ELECTROCARDIOGRAM REPORT: CPT | Performed by: INTERNAL MEDICINE

## 2025-07-18 PROCEDURE — 84155 ASSAY OF PROTEIN SERUM: CPT

## 2025-07-18 PROCEDURE — 6370000000 HC RX 637 (ALT 250 FOR IP)

## 2025-07-18 PROCEDURE — 6360000004 HC RX CONTRAST MEDICATION

## 2025-07-18 PROCEDURE — 84100 ASSAY OF PHOSPHORUS: CPT

## 2025-07-18 PROCEDURE — 80053 COMPREHEN METABOLIC PANEL: CPT

## 2025-07-18 PROCEDURE — 82962 GLUCOSE BLOOD TEST: CPT

## 2025-07-18 PROCEDURE — 2580000003 HC RX 258: Performed by: INTERNAL MEDICINE

## 2025-07-18 PROCEDURE — 93306 TTE W/DOPPLER COMPLETE: CPT | Performed by: INTERNAL MEDICINE

## 2025-07-18 PROCEDURE — C8929 TTE W OR WO FOL WCON,DOPPLER: HCPCS

## 2025-07-18 PROCEDURE — 85025 COMPLETE CBC W/AUTO DIFF WBC: CPT

## 2025-07-18 PROCEDURE — 82306 VITAMIN D 25 HYDROXY: CPT

## 2025-07-18 PROCEDURE — 1200000000 HC SEMI PRIVATE

## 2025-07-18 RX ORDER — CARVEDILOL 3.12 MG/1
3.12 TABLET ORAL 2 TIMES DAILY WITH MEALS
Status: DISCONTINUED | OUTPATIENT
Start: 2025-07-18 | End: 2025-07-24 | Stop reason: HOSPADM

## 2025-07-18 RX ORDER — ISOSORBIDE MONONITRATE 30 MG/1
30 TABLET, EXTENDED RELEASE ORAL DAILY
Status: DISCONTINUED | OUTPATIENT
Start: 2025-07-19 | End: 2025-07-24 | Stop reason: HOSPADM

## 2025-07-18 RX ADMIN — SULFUR HEXAFLUORIDE 2 ML: 60.7; .19; .19 INJECTION, POWDER, LYOPHILIZED, FOR SUSPENSION INTRAVENOUS; INTRAVESICAL at 11:01

## 2025-07-18 RX ADMIN — AMLODIPINE BESYLATE 5 MG: 5 TABLET ORAL at 11:01

## 2025-07-18 RX ADMIN — INSULIN GLARGINE 15 UNITS: 100 INJECTION, SOLUTION SUBCUTANEOUS at 21:11

## 2025-07-18 RX ADMIN — SODIUM CHLORIDE, PRESERVATIVE FREE 10 ML: 5 INJECTION INTRAVENOUS at 11:01

## 2025-07-18 RX ADMIN — ROSUVASTATIN CALCIUM 20 MG: 20 TABLET, FILM COATED ORAL at 11:01

## 2025-07-18 RX ADMIN — SODIUM CHLORIDE 125 MG: 9 INJECTION, SOLUTION INTRAVENOUS at 18:16

## 2025-07-18 RX ADMIN — RIVAROXABAN 15 MG: 15 TABLET, FILM COATED ORAL at 11:01

## 2025-07-18 RX ADMIN — INSULIN GLARGINE 15 UNITS: 100 INJECTION, SOLUTION SUBCUTANEOUS at 11:01

## 2025-07-18 NOTE — PLAN OF CARE
Problem: ABCDS Injury Assessment  Goal: Absence of physical injury  7/18/2025 0336 by Shereen Hill, RN  Outcome: Progressing     Problem: Discharge Planning  Goal: Discharge to home or other facility with appropriate resources  7/18/2025 0336 by Shereen Hill, RN  Outcome: Progressing

## 2025-07-18 NOTE — ACP (ADVANCE CARE PLANNING)
Advance Care Planning   Healthcare Decision Maker:    Primary Decision Maker: Dr. Yair Fountain - Child - [phone]    Primary Decision Maker: Javier Fuller - Child - [phone]    Secondary Decision Maker: Stella Fountain E - Spouse - [phone]    Click here to complete Healthcare Decision Makers including selection of the Healthcare Decision Maker Relationship (ie \"Primary\").

## 2025-07-18 NOTE — CARE COORDINATION
Social work / Discharge planning:          Social work spoke to patient's son Dr. Fountain for initial assessment.   Patient lives with his wife in a one floor home.   He owns a cane, wheeled walker, wheelchair and scooter.     PT/OT evaluations ordered.    Family anticipates discharge home and are receptive to home care.    No preference for agency.    Referral sent to [facility] by [facility] via [facility].    Will need home care orders.                  Pulmonology requested that bipap which was ordered by their office get delivered to the hospital.   Per pulmonology note, bipap was ordered from [facility].    Social work contacted [facility] and was told it was ordered from [facility].    Social work spoke to Nikki at [facility] [phone].   They can only deliver the bipap to the hospital within 48 hours of discharge which does not include weekend.   Will need to contact [facility] when discharge is anticipated to coordinate delivery of bipap.    Electronically signed by GORDON Arroyo on 7/18/2025 at 1:02 PM

## 2025-07-19 LAB
ANION GAP SERPL CALCULATED.3IONS-SCNC: 16 MMOL/L (ref 7–16)
BASOPHILS # BLD: 0.06 K/UL (ref 0–0.2)
BASOPHILS NFR BLD: 1 % (ref 0–2)
BNP SERPL-MCNC: ABNORMAL PG/ML (ref 0–450)
BUN SERPL-MCNC: 82 MG/DL (ref 8–23)
CALCIUM SERPL-MCNC: 9.7 MG/DL (ref 8.8–10.2)
CHLORIDE SERPL-SCNC: 103 MMOL/L (ref 98–107)
CO2 SERPL-SCNC: 20 MMOL/L (ref 22–29)
CREAT SERPL-MCNC: 3.2 MG/DL (ref 0.7–1.2)
EOSINOPHIL # BLD: 0.46 K/UL (ref 0.05–0.5)
EOSINOPHILS RELATIVE PERCENT: 7 % (ref 0–6)
ERYTHROCYTE [DISTWIDTH] IN BLOOD BY AUTOMATED COUNT: 14.6 % (ref 11.5–15)
GFR, ESTIMATED: 18 ML/MIN/1.73M2
GLUCOSE BLD-MCNC: 100 MG/DL (ref 74–99)
GLUCOSE BLD-MCNC: 134 MG/DL (ref 74–99)
GLUCOSE BLD-MCNC: 139 MG/DL (ref 74–99)
GLUCOSE BLD-MCNC: 90 MG/DL (ref 74–99)
GLUCOSE SERPL-MCNC: 90 MG/DL (ref 74–99)
HCT VFR BLD AUTO: 28.7 % (ref 37–54)
HGB BLD-MCNC: 8.7 G/DL (ref 12.5–16.5)
IMM GRANULOCYTES # BLD AUTO: <0.03 K/UL (ref 0–0.58)
IMM GRANULOCYTES NFR BLD: 0 % (ref 0–5)
LYMPHOCYTES NFR BLD: 1.12 K/UL (ref 1.5–4)
LYMPHOCYTES RELATIVE PERCENT: 16 % (ref 20–42)
MAGNESIUM SERPL-MCNC: 3 MG/DL (ref 1.6–2.4)
MCH RBC QN AUTO: 24.9 PG (ref 26–35)
MCHC RBC AUTO-ENTMCNC: 30.3 G/DL (ref 32–34.5)
MCV RBC AUTO: 82.2 FL (ref 80–99.9)
MONOCYTES NFR BLD: 0.83 K/UL (ref 0.1–0.95)
MONOCYTES NFR BLD: 12 % (ref 2–12)
NEUTROPHILS NFR BLD: 65 % (ref 43–80)
NEUTS SEG NFR BLD: 4.6 K/UL (ref 1.8–7.3)
PHOSPHATE SERPL-MCNC: 5.2 MG/DL (ref 2.5–4.5)
PLATELET # BLD AUTO: 203 K/UL (ref 130–450)
PMV BLD AUTO: 11.7 FL (ref 7–12)
POTASSIUM SERPL-SCNC: 4.4 MMOL/L (ref 3.5–5.1)
RBC # BLD AUTO: 3.49 M/UL (ref 3.8–5.8)
SODIUM SERPL-SCNC: 140 MMOL/L (ref 136–145)
WBC OTHER # BLD: 7.1 K/UL (ref 4.5–11.5)

## 2025-07-19 PROCEDURE — 99232 SBSQ HOSP IP/OBS MODERATE 35: CPT | Performed by: STUDENT IN AN ORGANIZED HEALTH CARE EDUCATION/TRAINING PROGRAM

## 2025-07-19 PROCEDURE — 83735 ASSAY OF MAGNESIUM: CPT

## 2025-07-19 PROCEDURE — 6370000000 HC RX 637 (ALT 250 FOR IP)

## 2025-07-19 PROCEDURE — 6360000002 HC RX W HCPCS: Performed by: INTERNAL MEDICINE

## 2025-07-19 PROCEDURE — 85025 COMPLETE CBC W/AUTO DIFF WBC: CPT

## 2025-07-19 PROCEDURE — 82962 GLUCOSE BLOOD TEST: CPT

## 2025-07-19 PROCEDURE — 97161 PT EVAL LOW COMPLEX 20 MIN: CPT

## 2025-07-19 PROCEDURE — 83880 ASSAY OF NATRIURETIC PEPTIDE: CPT

## 2025-07-19 PROCEDURE — 2500000003 HC RX 250 WO HCPCS

## 2025-07-19 PROCEDURE — 6370000000 HC RX 637 (ALT 250 FOR IP): Performed by: INTERNAL MEDICINE

## 2025-07-19 PROCEDURE — 2580000003 HC RX 258: Performed by: INTERNAL MEDICINE

## 2025-07-19 PROCEDURE — 2060000000 HC ICU INTERMEDIATE R&B

## 2025-07-19 PROCEDURE — 84100 ASSAY OF PHOSPHORUS: CPT

## 2025-07-19 PROCEDURE — 99233 SBSQ HOSP IP/OBS HIGH 50: CPT | Performed by: INTERNAL MEDICINE

## 2025-07-19 PROCEDURE — 80048 BASIC METABOLIC PNL TOTAL CA: CPT

## 2025-07-19 RX ORDER — DOBUTAMINE HYDROCHLORIDE 400 MG/100ML
2.5-1 INJECTION INTRAVENOUS CONTINUOUS
Status: DISCONTINUED | OUTPATIENT
Start: 2025-07-19 | End: 2025-07-20

## 2025-07-19 RX ADMIN — ISOSORBIDE MONONITRATE 30 MG: 30 TABLET, EXTENDED RELEASE ORAL at 08:36

## 2025-07-19 RX ADMIN — SODIUM CHLORIDE, PRESERVATIVE FREE 10 ML: 5 INJECTION INTRAVENOUS at 21:20

## 2025-07-19 RX ADMIN — RIVAROXABAN 15 MG: 15 TABLET, FILM COATED ORAL at 08:36

## 2025-07-19 RX ADMIN — BUMETANIDE 0.5 MG/HR: 0.25 INJECTION INTRAMUSCULAR; INTRAVENOUS at 04:06

## 2025-07-19 RX ADMIN — SODIUM CHLORIDE, PRESERVATIVE FREE 10 ML: 5 INJECTION INTRAVENOUS at 08:38

## 2025-07-19 RX ADMIN — ROSUVASTATIN CALCIUM 20 MG: 20 TABLET, FILM COATED ORAL at 08:36

## 2025-07-19 RX ADMIN — CARVEDILOL 3.12 MG: 3.12 TABLET, FILM COATED ORAL at 08:36

## 2025-07-19 RX ADMIN — INSULIN GLARGINE 15 UNITS: 100 INJECTION, SOLUTION SUBCUTANEOUS at 21:23

## 2025-07-19 RX ADMIN — DOBUTAMINE HYDROCHLORIDE 2.5 MCG/KG/MIN: 400 INJECTION INTRAVENOUS at 14:41

## 2025-07-19 ASSESSMENT — PAIN SCALES - GENERAL
PAINLEVEL_OUTOF10: 0
PAINLEVEL_OUTOF10: 0

## 2025-07-19 NOTE — PLAN OF CARE
Problem: ABCDS Injury Assessment  Goal: Absence of physical injury  7/19/2025 1228 by Nando Hall RN  Outcome: Progressing  7/18/2025 2254 by Lorna Jaquez RN  Outcome: Progressing  Flowsheets (Taken 7/18/2025 2111)  Absence of Physical Injury: Implement safety measures based on patient assessment     Problem: Discharge Planning  Goal: Discharge to home or other facility with appropriate resources  7/19/2025 1228 by Nando Hall RN  Outcome: Progressing  7/18/2025 2254 by Lorna Jaquez RN  Outcome: Progressing  Flowsheets (Taken 7/18/2025 2111)  Discharge to home or other facility with appropriate resources: Identify barriers to discharge with patient and caregiver     Problem: Safety - Adult  Goal: Free from fall injury  7/19/2025 1228 by Nando Hall RN  Outcome: Progressing  7/18/2025 2254 by Lorna Jaquez RN  Outcome: Progressing     Problem: Chronic Conditions and Co-morbidities  Goal: Patient's chronic conditions and co-morbidity symptoms are monitored and maintained or improved  7/19/2025 1228 by Nando Hall RN  Outcome: Progressing  7/18/2025 2254 by Lorna Jaquez RN  Outcome: Progressing  Flowsheets (Taken 7/18/2025 2111)  Care Plan - Patient's Chronic Conditions and Co-Morbidity Symptoms are Monitored and Maintained or Improved: Monitor and assess patient's chronic conditions and comorbid symptoms for stability, deterioration, or improvement     Problem: Pain  Goal: Verbalizes/displays adequate comfort level or baseline comfort level  7/19/2025 1228 by Nando Hall RN  Outcome: Progressing  7/18/2025 2254 by Lorna Jaquez RN  Outcome: Progressing

## 2025-07-19 NOTE — PLAN OF CARE
Problem: ABCDS Injury Assessment  Goal: Absence of physical injury  Outcome: Progressing  Flowsheets (Taken 7/18/2025 2111)  Absence of Physical Injury: Implement safety measures based on patient assessment     Problem: Discharge Planning  Goal: Discharge to home or other facility with appropriate resources  Outcome: Progressing  Flowsheets (Taken 7/18/2025 2111)  Discharge to home or other facility with appropriate resources: Identify barriers to discharge with patient and caregiver     Problem: Safety - Adult  Goal: Free from fall injury  Outcome: Progressing     Problem: Chronic Conditions and Co-morbidities  Goal: Patient's chronic conditions and co-morbidity symptoms are monitored and maintained or improved  7/18/2025 2254 by Lorna Jaquez, RN  Outcome: Progressing  Flowsheets (Taken 7/18/2025 2111)  Care Plan - Patient's Chronic Conditions and Co-Morbidity Symptoms are Monitored and Maintained or Improved: Monitor and assess patient's chronic conditions and comorbid symptoms for stability, deterioration, or improvement  7/18/2025 1113 by Hellen Haywood, RN  Outcome: Progressing     Problem: Pain  Goal: Verbalizes/displays adequate comfort level or baseline comfort level  7/18/2025 2254 by Lorna Jaquez, RN  Outcome: Progressing  7/18/2025 1113 by Hellen Haywood, RN  Outcome: Progressing

## 2025-07-19 NOTE — PLAN OF CARE
Problem: ABCDS Injury Assessment  Goal: Absence of physical injury  7/19/2025 1510 by Tonja Gardiner RN  Outcome: Progressing     Problem: Discharge Planning  Goal: Discharge to home or other facility with appropriate resources  7/19/2025 1510 by Tonja Gardiner RN  Outcome: Progressing     Problem: Safety - Adult  Goal: Free from fall injury  7/19/2025 1510 by Tonja Gardiner RN  Outcome: Progressing     Problem: Chronic Conditions and Co-morbidities  Goal: Patient's chronic conditions and co-morbidity symptoms are monitored and maintained or improved  7/19/2025 1510 by Tonja Gardiner RN  Outcome: Progressing     Problem: Pain  Goal: Verbalizes/displays adequate comfort level or baseline comfort level  7/19/2025 1510 by Tonja Gardiner RN  Outcome: Progressing

## 2025-07-20 LAB
ANION GAP SERPL CALCULATED.3IONS-SCNC: 16 MMOL/L (ref 7–16)
BASOPHILS # BLD: 0.05 K/UL (ref 0–0.2)
BASOPHILS NFR BLD: 1 % (ref 0–2)
BUN SERPL-MCNC: 85 MG/DL (ref 8–23)
CALCIUM SERPL-MCNC: 9.4 MG/DL (ref 8.8–10.2)
CHLORIDE SERPL-SCNC: 101 MMOL/L (ref 98–107)
CO2 SERPL-SCNC: 20 MMOL/L (ref 22–29)
CREAT SERPL-MCNC: 3.5 MG/DL (ref 0.7–1.2)
EOSINOPHIL # BLD: 0.34 K/UL (ref 0.05–0.5)
EOSINOPHILS RELATIVE PERCENT: 7 % (ref 0–6)
ERYTHROCYTE [DISTWIDTH] IN BLOOD BY AUTOMATED COUNT: 14.6 % (ref 11.5–15)
GFR, ESTIMATED: 17 ML/MIN/1.73M2
GLUCOSE BLD-MCNC: 112 MG/DL (ref 74–99)
GLUCOSE BLD-MCNC: 112 MG/DL (ref 74–99)
GLUCOSE BLD-MCNC: 119 MG/DL (ref 74–99)
GLUCOSE BLD-MCNC: 86 MG/DL (ref 74–99)
GLUCOSE SERPL-MCNC: 89 MG/DL (ref 74–99)
HCT VFR BLD AUTO: 25.7 % (ref 37–54)
HGB BLD-MCNC: 8 G/DL (ref 12.5–16.5)
IMM GRANULOCYTES # BLD AUTO: <0.03 K/UL (ref 0–0.58)
IMM GRANULOCYTES NFR BLD: 0 % (ref 0–5)
LYMPHOCYTES NFR BLD: 0.92 K/UL (ref 1.5–4)
LYMPHOCYTES RELATIVE PERCENT: 18 % (ref 20–42)
MAGNESIUM SERPL-MCNC: 2.8 MG/DL (ref 1.6–2.4)
MCH RBC QN AUTO: 24.8 PG (ref 26–35)
MCHC RBC AUTO-ENTMCNC: 31.1 G/DL (ref 32–34.5)
MCV RBC AUTO: 79.8 FL (ref 80–99.9)
MONOCYTES NFR BLD: 0.51 K/UL (ref 0.1–0.95)
MONOCYTES NFR BLD: 10 % (ref 2–12)
NEUTROPHILS NFR BLD: 63 % (ref 43–80)
NEUTS SEG NFR BLD: 3.18 K/UL (ref 1.8–7.3)
PHOSPHATE SERPL-MCNC: 5.2 MG/DL (ref 2.5–4.5)
PLATELET # BLD AUTO: 168 K/UL (ref 130–450)
PMV BLD AUTO: 11.9 FL (ref 7–12)
POTASSIUM SERPL-SCNC: 4.1 MMOL/L (ref 3.5–5.1)
RBC # BLD AUTO: 3.22 M/UL (ref 3.8–5.8)
SODIUM SERPL-SCNC: 137 MMOL/L (ref 136–145)
WBC OTHER # BLD: 5 K/UL (ref 4.5–11.5)

## 2025-07-20 PROCEDURE — 2580000003 HC RX 258: Performed by: INTERNAL MEDICINE

## 2025-07-20 PROCEDURE — 99233 SBSQ HOSP IP/OBS HIGH 50: CPT | Performed by: INTERNAL MEDICINE

## 2025-07-20 PROCEDURE — 6360000002 HC RX W HCPCS: Performed by: INTERNAL MEDICINE

## 2025-07-20 PROCEDURE — 85025 COMPLETE CBC W/AUTO DIFF WBC: CPT

## 2025-07-20 PROCEDURE — 84100 ASSAY OF PHOSPHORUS: CPT

## 2025-07-20 PROCEDURE — 82962 GLUCOSE BLOOD TEST: CPT

## 2025-07-20 PROCEDURE — 99233 SBSQ HOSP IP/OBS HIGH 50: CPT | Performed by: STUDENT IN AN ORGANIZED HEALTH CARE EDUCATION/TRAINING PROGRAM

## 2025-07-20 PROCEDURE — 6370000000 HC RX 637 (ALT 250 FOR IP): Performed by: INTERNAL MEDICINE

## 2025-07-20 PROCEDURE — 2500000003 HC RX 250 WO HCPCS

## 2025-07-20 PROCEDURE — 93005 ELECTROCARDIOGRAM TRACING: CPT | Performed by: INTERNAL MEDICINE

## 2025-07-20 PROCEDURE — 2060000000 HC ICU INTERMEDIATE R&B

## 2025-07-20 PROCEDURE — 6370000000 HC RX 637 (ALT 250 FOR IP)

## 2025-07-20 PROCEDURE — 83735 ASSAY OF MAGNESIUM: CPT

## 2025-07-20 PROCEDURE — 6360000002 HC RX W HCPCS

## 2025-07-20 PROCEDURE — 80048 BASIC METABOLIC PNL TOTAL CA: CPT

## 2025-07-20 RX ORDER — LORAZEPAM 0.5 MG/1
0.5 TABLET ORAL EVERY 8 HOURS PRN
Status: DISCONTINUED | OUTPATIENT
Start: 2025-07-20 | End: 2025-07-24 | Stop reason: HOSPADM

## 2025-07-20 RX ORDER — DOBUTAMINE HYDROCHLORIDE 400 MG/100ML
5 INJECTION INTRAVENOUS CONTINUOUS
Status: DISCONTINUED | OUTPATIENT
Start: 2025-07-20 | End: 2025-07-23

## 2025-07-20 RX ADMIN — ACETAMINOPHEN 650 MG: 325 TABLET ORAL at 03:58

## 2025-07-20 RX ADMIN — ONDANSETRON 4 MG: 2 INJECTION, SOLUTION INTRAMUSCULAR; INTRAVENOUS at 15:20

## 2025-07-20 RX ADMIN — SODIUM CHLORIDE, PRESERVATIVE FREE 10 ML: 5 INJECTION INTRAVENOUS at 20:14

## 2025-07-20 RX ADMIN — ROSUVASTATIN CALCIUM 20 MG: 20 TABLET, FILM COATED ORAL at 09:06

## 2025-07-20 RX ADMIN — DOBUTAMINE HYDROCHLORIDE 5 MCG/KG/MIN: 400 INJECTION INTRAVENOUS at 09:07

## 2025-07-20 RX ADMIN — LORAZEPAM 0.5 MG: 0.5 TABLET ORAL at 23:29

## 2025-07-20 RX ADMIN — INSULIN GLARGINE 15 UNITS: 100 INJECTION, SOLUTION SUBCUTANEOUS at 20:13

## 2025-07-20 RX ADMIN — BUMETANIDE 0.5 MG/HR: 0.25 INJECTION INTRAMUSCULAR; INTRAVENOUS at 03:07

## 2025-07-20 RX ADMIN — LORAZEPAM 0.5 MG: 0.5 TABLET ORAL at 15:23

## 2025-07-20 RX ADMIN — RIVAROXABAN 15 MG: 15 TABLET, FILM COATED ORAL at 09:03

## 2025-07-20 RX ADMIN — INSULIN GLARGINE 15 UNITS: 100 INJECTION, SOLUTION SUBCUTANEOUS at 09:04

## 2025-07-20 ASSESSMENT — PAIN SCALES - GENERAL
PAINLEVEL_OUTOF10: 0
PAINLEVEL_OUTOF10: 0
PAINLEVEL_OUTOF10: 7
PAINLEVEL_OUTOF10: 0

## 2025-07-20 ASSESSMENT — PAIN DESCRIPTION - ONSET: ONSET: ON-GOING

## 2025-07-20 ASSESSMENT — PAIN - FUNCTIONAL ASSESSMENT: PAIN_FUNCTIONAL_ASSESSMENT: PREVENTS OR INTERFERES SOME ACTIVE ACTIVITIES AND ADLS

## 2025-07-20 ASSESSMENT — PAIN DESCRIPTION - PAIN TYPE: TYPE: ACUTE PAIN

## 2025-07-20 ASSESSMENT — PAIN DESCRIPTION - LOCATION: LOCATION: LEG

## 2025-07-20 ASSESSMENT — PAIN DESCRIPTION - FREQUENCY: FREQUENCY: INTERMITTENT

## 2025-07-20 ASSESSMENT — PAIN DESCRIPTION - ORIENTATION: ORIENTATION: LEFT;RIGHT;LOWER

## 2025-07-21 ENCOUNTER — APPOINTMENT (OUTPATIENT)
Age: 85
DRG: 291 | End: 2025-07-21
Attending: INTERNAL MEDICINE
Payer: MEDICARE

## 2025-07-21 PROBLEM — Z51.5 PALLIATIVE CARE ENCOUNTER: Status: ACTIVE | Noted: 2025-07-21

## 2025-07-21 PROBLEM — Z71.89 GOALS OF CARE, COUNSELING/DISCUSSION: Status: ACTIVE | Noted: 2025-07-21

## 2025-07-21 LAB
ANION GAP SERPL CALCULATED.3IONS-SCNC: 16 MMOL/L (ref 7–16)
BASOPHILS # BLD: 0.03 K/UL (ref 0–0.2)
BASOPHILS NFR BLD: 1 % (ref 0–2)
BNP SERPL-MCNC: ABNORMAL PG/ML (ref 0–450)
BUN SERPL-MCNC: 89 MG/DL (ref 8–23)
CALCIUM SERPL-MCNC: 9.4 MG/DL (ref 8.8–10.2)
CHLORIDE SERPL-SCNC: 99 MMOL/L (ref 98–107)
CO2 SERPL-SCNC: 19 MMOL/L (ref 22–29)
CREAT SERPL-MCNC: 4 MG/DL (ref 0.7–1.2)
EKG ATRIAL RATE: 71 BPM
EKG Q-T INTERVAL: 406 MS
EKG QRS DURATION: 92 MS
EKG QTC CALCULATION (BAZETT): 447 MS
EKG R AXIS: 56 DEGREES
EKG T AXIS: 183 DEGREES
EKG VENTRICULAR RATE: 73 BPM
EOSINOPHIL # BLD: 0.17 K/UL (ref 0.05–0.5)
EOSINOPHILS RELATIVE PERCENT: 4 % (ref 0–6)
ERYTHROCYTE [DISTWIDTH] IN BLOOD BY AUTOMATED COUNT: 14.6 % (ref 11.5–15)
GFR, ESTIMATED: 14 ML/MIN/1.73M2
GLUCOSE BLD-MCNC: 103 MG/DL (ref 74–99)
GLUCOSE BLD-MCNC: 107 MG/DL (ref 74–99)
GLUCOSE BLD-MCNC: 107 MG/DL (ref 74–99)
GLUCOSE BLD-MCNC: 95 MG/DL (ref 74–99)
GLUCOSE SERPL-MCNC: 94 MG/DL (ref 74–99)
HCT VFR BLD AUTO: 24.8 % (ref 37–54)
HGB BLD-MCNC: 7.8 G/DL (ref 12.5–16.5)
IMM GRANULOCYTES # BLD AUTO: <0.03 K/UL (ref 0–0.58)
IMM GRANULOCYTES NFR BLD: 0 % (ref 0–5)
LYMPHOCYTES NFR BLD: 0.85 K/UL (ref 1.5–4)
LYMPHOCYTES RELATIVE PERCENT: 18 % (ref 20–42)
MAGNESIUM SERPL-MCNC: 2.9 MG/DL (ref 1.6–2.4)
MCH RBC QN AUTO: 25.3 PG (ref 26–35)
MCHC RBC AUTO-ENTMCNC: 31.5 G/DL (ref 32–34.5)
MCV RBC AUTO: 80.5 FL (ref 80–99.9)
MONOCYTES NFR BLD: 0.47 K/UL (ref 0.1–0.95)
MONOCYTES NFR BLD: 10 % (ref 2–12)
NEUTROPHILS NFR BLD: 68 % (ref 43–80)
NEUTS SEG NFR BLD: 3.29 K/UL (ref 1.8–7.3)
PHOSPHATE SERPL-MCNC: 5.7 MG/DL (ref 2.5–4.5)
PLATELET # BLD AUTO: 164 K/UL (ref 130–450)
PMV BLD AUTO: 12.2 FL (ref 7–12)
POTASSIUM SERPL-SCNC: 4.3 MMOL/L (ref 3.5–5.1)
RBC # BLD AUTO: 3.08 M/UL (ref 3.8–5.8)
SODIUM SERPL-SCNC: 134 MMOL/L (ref 136–145)
WBC OTHER # BLD: 4.8 K/UL (ref 4.5–11.5)

## 2025-07-21 PROCEDURE — 84100 ASSAY OF PHOSPHORUS: CPT

## 2025-07-21 PROCEDURE — C8924 2D TTE W OR W/O FOL W/CON,FU: HCPCS

## 2025-07-21 PROCEDURE — 30233N1 TRANSFUSION OF NONAUTOLOGOUS RED BLOOD CELLS INTO PERIPHERAL VEIN, PERCUTANEOUS APPROACH: ICD-10-PCS | Performed by: STUDENT IN AN ORGANIZED HEALTH CARE EDUCATION/TRAINING PROGRAM

## 2025-07-21 PROCEDURE — 2580000003 HC RX 258: Performed by: INTERNAL MEDICINE

## 2025-07-21 PROCEDURE — 80048 BASIC METABOLIC PNL TOTAL CA: CPT

## 2025-07-21 PROCEDURE — 36415 COLL VENOUS BLD VENIPUNCTURE: CPT

## 2025-07-21 PROCEDURE — 86900 BLOOD TYPING SEROLOGIC ABO: CPT

## 2025-07-21 PROCEDURE — 6360000002 HC RX W HCPCS: Performed by: INTERNAL MEDICINE

## 2025-07-21 PROCEDURE — 83735 ASSAY OF MAGNESIUM: CPT

## 2025-07-21 PROCEDURE — 86850 RBC ANTIBODY SCREEN: CPT

## 2025-07-21 PROCEDURE — 85025 COMPLETE CBC W/AUTO DIFF WBC: CPT

## 2025-07-21 PROCEDURE — 93010 ELECTROCARDIOGRAM REPORT: CPT | Performed by: INTERNAL MEDICINE

## 2025-07-21 PROCEDURE — 83880 ASSAY OF NATRIURETIC PEPTIDE: CPT

## 2025-07-21 PROCEDURE — 36430 TRANSFUSION BLD/BLD COMPNT: CPT

## 2025-07-21 PROCEDURE — P9016 RBC LEUKOCYTES REDUCED: HCPCS

## 2025-07-21 PROCEDURE — 6370000000 HC RX 637 (ALT 250 FOR IP)

## 2025-07-21 PROCEDURE — 86923 COMPATIBILITY TEST ELECTRIC: CPT

## 2025-07-21 PROCEDURE — 99233 SBSQ HOSP IP/OBS HIGH 50: CPT | Performed by: STUDENT IN AN ORGANIZED HEALTH CARE EDUCATION/TRAINING PROGRAM

## 2025-07-21 PROCEDURE — 2060000000 HC ICU INTERMEDIATE R&B

## 2025-07-21 PROCEDURE — 99221 1ST HOSP IP/OBS SF/LOW 40: CPT | Performed by: NURSE PRACTITIONER

## 2025-07-21 PROCEDURE — 2500000003 HC RX 250 WO HCPCS

## 2025-07-21 PROCEDURE — 82962 GLUCOSE BLOOD TEST: CPT

## 2025-07-21 PROCEDURE — 99233 SBSQ HOSP IP/OBS HIGH 50: CPT | Performed by: INTERNAL MEDICINE

## 2025-07-21 PROCEDURE — 86901 BLOOD TYPING SEROLOGIC RH(D): CPT

## 2025-07-21 PROCEDURE — 6370000000 HC RX 637 (ALT 250 FOR IP): Performed by: STUDENT IN AN ORGANIZED HEALTH CARE EDUCATION/TRAINING PROGRAM

## 2025-07-21 RX ORDER — ROSUVASTATIN CALCIUM 10 MG/1
10 TABLET, COATED ORAL DAILY
Status: DISCONTINUED | OUTPATIENT
Start: 2025-07-21 | End: 2025-07-24 | Stop reason: HOSPADM

## 2025-07-21 RX ORDER — MIDODRINE HYDROCHLORIDE 5 MG/1
5 TABLET ORAL
Status: DISCONTINUED | OUTPATIENT
Start: 2025-07-21 | End: 2025-07-24

## 2025-07-21 RX ORDER — SODIUM CHLORIDE 9 MG/ML
INJECTION, SOLUTION INTRAVENOUS CONTINUOUS
Status: DISCONTINUED | OUTPATIENT
Start: 2025-07-21 | End: 2025-07-22

## 2025-07-21 RX ORDER — SODIUM CHLORIDE 9 MG/ML
INJECTION, SOLUTION INTRAVENOUS PRN
Status: DISCONTINUED | OUTPATIENT
Start: 2025-07-21 | End: 2025-07-24 | Stop reason: HOSPADM

## 2025-07-21 RX ADMIN — RIVAROXABAN 15 MG: 15 TABLET, FILM COATED ORAL at 10:01

## 2025-07-21 RX ADMIN — MIDODRINE HYDROCHLORIDE 5 MG: 5 TABLET ORAL at 07:05

## 2025-07-21 RX ADMIN — MIDODRINE HYDROCHLORIDE 5 MG: 5 TABLET ORAL at 12:33

## 2025-07-21 RX ADMIN — INSULIN GLARGINE 15 UNITS: 100 INJECTION, SOLUTION SUBCUTANEOUS at 10:03

## 2025-07-21 RX ADMIN — DOBUTAMINE HYDROCHLORIDE 5 MCG/KG/MIN: 400 INJECTION INTRAVENOUS at 06:44

## 2025-07-21 RX ADMIN — INSULIN GLARGINE 15 UNITS: 100 INJECTION, SOLUTION SUBCUTANEOUS at 19:50

## 2025-07-21 RX ADMIN — SODIUM CHLORIDE: 0.9 INJECTION, SOLUTION INTRAVENOUS at 12:36

## 2025-07-21 RX ADMIN — MIDODRINE HYDROCHLORIDE 5 MG: 5 TABLET ORAL at 16:17

## 2025-07-21 RX ADMIN — SODIUM CHLORIDE, PRESERVATIVE FREE 10 ML: 5 INJECTION INTRAVENOUS at 10:02

## 2025-07-21 RX ADMIN — ROSUVASTATIN CALCIUM 10 MG: 10 TABLET, FILM COATED ORAL at 10:01

## 2025-07-21 RX ADMIN — SODIUM CHLORIDE 125 MG: 9 INJECTION, SOLUTION INTRAVENOUS at 16:20

## 2025-07-21 ASSESSMENT — PAIN SCALES - GENERAL
PAINLEVEL_OUTOF10: 0

## 2025-07-21 NOTE — CONSULTS
[facility]   Inpatient CHF Nurse Navigator Consult    Cardiologist: Dr Ricks   Primary Care Physician: Maulik Cowan MD     Thee Fountain is a 85 y.o. (1940) male with a history of HFrEF, most recent EF:  Lab Results   Component Value Date    LVEF 43 10/14/2021       07/18/2025 EF 35-40%    Patient was awake and alert, sitting in the chair during the consultation and is agreeable to heart failure education. He was engaged and asked appropriate questions throughout the education session. He is still short of breath with activity.     Barriers identified during consult contributing to HF Hospitalization: none.     [] Limited medication adherence   [] Poor health literacy, education regarding HF medications provided   [] Pill box provided to patient  [] Difficulty affording medications  [] Difficulty obtaining/ managing medications  [] Prescription assistance information given     [] Not weighing themselves daily  [x] Weight log provided for easy monitoring  [] Scale provided     [] Not following low sodium diet  [] Food insecurity   [x] 2 gram sodium diet education provided   [] Low sodium recipes provided  [] Sodium free seasoning provided   [] Low sodium meal delivery options given to patient  [] Dietician consulted     [] Lack of transportation to appointments     [] Depression, given chronic illness  [] Primary team notified     [] Goals of care need addressed  [] Palliative care consulted     [] CHF community health worker Aishwarya Andersen consulted [phone], to assist with     Chart Reviewed:  Diet: ADULT DIET; Regular; 4 carb choices (60 gm/meal); Low Sodium (2 gm)   Daily Weights: Patient Vitals for the past 96 hrs (Last 3 readings):   Weight   07/19/25 0345 112.9 kg (248 lb 12.8 oz)   07/18/25 0600 112.4 kg (247 lb 12.8 oz)   07/17/25 1749 113.6 kg (250 lb 6.4 oz)     I/O:   Intake/Output Summary (Last 24 hours) at 7/21/2025 1046  Last data filed at 7/21/2025

## 2025-07-21 NOTE — CARE COORDINATION
CARE MANAGEMENT... Patient transferred from [address] [place] [address] to [address] [place] [address] for closer cardiac/renal monitoring on 7/19. Placed on iv dobutamine gtt and iv bumex gtt on hold d/t hypotension. Attempted to place patient on coreg and and continue lisinopril from home, but d/t low bps they are on hold. Started midodrine today. On iv ferrlecit mwf. On xareleto pta. Patient is tolerating room air. Cardio/Pulm/CHF nurse on board. Per conversation with patients daughter yesterday, patient follows with the [facility]patient [facility]. PT 18/OT 17. Will need updated therapy evals closer to dc for planning/needs. At this time dc plan is home with [facility]-updated on patient status. Will follow.

## 2025-07-21 NOTE — CONSULTS
Palliative Care Department  [phone]  Palliative Care Initial Consult  Provider CHOCO Love CNP    Thee Fountain  [id number]  Hospital Day: 5  Date of Initial Consult: 7/21/2025  Referring Provider: Lana WILHELM-CNS  Palliative Medicine was consulted for assistance with: Symptom management, goals of care    HPI:   Thee Fountain is a 85 y.o. with a medical history of CHF, CKD, HTN, HLD, diabetes, A-fib on Xarelto, prostate cancer who was admitted on 7/17/2025 from home with a CHIEF COMPLAINT of shortness of breath.  Patient reports increased in shortness of breath with dyspnea on exertion, increased bilateral lower extremity edema and weight gain of 12 pounds.  Patient was seen by PCP on 7/14 at which time Lasix was increased from 40 mg once daily to 40 mg twice daily.  Patient presented to ED with worsening symptoms as well as worsening renal function on outpatient labs.  CXR showing no acute process.  Patient was admitted for further medical management.    ASSESSMENT/PLAN:     Pertinent Hospital Diagnoses     Acute on chronic decompensated CHF cardiomyopathy with a EF 35-40%, dilated RV  Pulmonary hypertension  SAURABH on CKD  Hx prostate cancer    Palliative Care Encounter / Counseling Regarding Goals of Care  Please see detailed goals of care discussion as below  At this time, Thee Fountain, Does have capacity for medical decision-making.  Capacity is time limited and situation/question specific  During encounter there was no surrogate medical decision-maker needed  Outcome of goals of care meeting:   Confirmed full code status  Will arrange OP palliative apt closer to discharge   Code status [id number]  Advanced Directives: no POA or living will in [facility]  Surrogate/Legal NOK:  Stella griggs (spouse) [phone]  Dr. Yair Fountain (child) [phone]  Aishwarya Morrison (child) [phone]    Spiritual assessment: no spiritual distress identified  Bereavement and grief: to be

## 2025-07-21 NOTE — CONSENT
Informed Consent for Blood Component Transfusion Note    I have discussed with the patient the rationale for blood component transfusion; its benefits in treating or preventing fatigue, organ damage, or death; and its risk which includes mild transfusion reactions, rare risk of blood borne infection, or more serious but rare reactions. I have discussed the alternatives to transfusion, including the risk and consequences of not receiving transfusion. The patient had an opportunity to ask questions and had agreed to proceed with transfusion of blood components.    Electronically signed by Aixa Perez MD on 7/21/25 at 2:38 PM EDT

## 2025-07-21 NOTE — PLAN OF CARE
Problem: ABCDS Injury Assessment  Goal: Absence of physical injury  7/21/2025 0242 by Todd Estevez RN  Outcome: Progressing  7/20/2025 1648 by Kathie Sandoval RN  Outcome: Progressing     Problem: Discharge Planning  Goal: Discharge to home or other facility with appropriate resources  7/21/2025 0242 by Todd Estevez RN  Outcome: Progressing  7/20/2025 1648 by Kathie Sandoval RN  Outcome: Progressing     Problem: Safety - Adult  Goal: Free from fall injury  7/21/2025 0242 by Todd Estevez RN  Outcome: Progressing  7/20/2025 1648 by Kathie Sandoval RN  Outcome: Progressing     Problem: Chronic Conditions and Co-morbidities  Goal: Patient's chronic conditions and co-morbidity symptoms are monitored and maintained or improved  7/21/2025 0242 by Todd Estevez RN  Outcome: Progressing  7/20/2025 1648 by Kathie Sandoval RN  Outcome: Progressing     Problem: Pain  Goal: Verbalizes/displays adequate comfort level or baseline comfort level  7/21/2025 0242 by Todd Estevez RN  Outcome: Progressing  7/20/2025 1648 by Kathie Sandoval RN  Outcome: Progressing

## 2025-07-21 NOTE — PLAN OF CARE
Patient's chart updated to reflect:      .    - HF care plan, HF education points and HF discharge instructions.  -Orders: 2 gram sodium diet, daily weights, I/O.  -PCP or cardiology follow up appointments to be scheduled within 7 days of hospital discharge.  -CHF education session will be provided to the patient prior to hospital discharge.    Madhavi Rizvi RN   Heart Failure Navigator

## 2025-07-21 NOTE — PLAN OF CARE
Problem: ABCDS Injury Assessment  Goal: Absence of physical injury  7/21/2025 1146 by Elaine Dallas RN  Outcome: Progressing  7/21/2025 0242 by Todd Estevez RN  Outcome: Progressing     Problem: Discharge Planning  Goal: Discharge to home or other facility with appropriate resources  7/21/2025 0242 by Todd Estevez RN  Outcome: Progressing     Problem: Safety - Adult  Goal: Free from fall injury  7/21/2025 1146 by Elaine Dallas RN  Outcome: Progressing  7/21/2025 0242 by Todd Estevez RN  Outcome: Progressing     Problem: Chronic Conditions and Co-morbidities  Goal: Patient's chronic conditions and co-morbidity symptoms are monitored and maintained or improved  7/21/2025 1146 by Elaine Dallas RN  Outcome: Progressing  7/21/2025 0242 by Todd Estevez RN  Outcome: Progressing     Problem: Pain  Goal: Verbalizes/displays adequate comfort level or baseline comfort level  7/21/2025 0242 by Todd Estevez RN  Outcome: Progressing

## 2025-07-22 LAB
ANION GAP SERPL CALCULATED.3IONS-SCNC: 15 MMOL/L (ref 7–16)
BASOPHILS # BLD: 0.03 K/UL (ref 0–0.2)
BASOPHILS NFR BLD: 1 % (ref 0–2)
BUN SERPL-MCNC: 82 MG/DL (ref 8–23)
CALCIUM SERPL-MCNC: 8.1 MG/DL (ref 8.8–10.2)
CHLORIDE SERPL-SCNC: 102 MMOL/L (ref 98–107)
CO2 SERPL-SCNC: 17 MMOL/L (ref 22–29)
CREAT SERPL-MCNC: 2.8 MG/DL (ref 0.7–1.2)
ECHO BSA: 2.3 M2
ECHO EST RA PRESSURE: 3 MMHG
ECHO LA DIAMETER INDEX: 2.08 CM/M2
ECHO LA DIAMETER: 4.6 CM
ECHO LA VOL A-L A2C: 110 ML (ref 18–58)
ECHO LA VOL A-L A4C: 105 ML (ref 18–58)
ECHO LA VOL MOD A2C: 105 ML (ref 18–58)
ECHO LA VOL MOD A4C: 99 ML (ref 18–58)
ECHO LA VOLUME AREA LENGTH: 108 ML
ECHO LA VOLUME INDEX A-L A2C: 50 ML/M2 (ref 16–34)
ECHO LA VOLUME INDEX A-L A4C: 48 ML/M2 (ref 16–34)
ECHO LA VOLUME INDEX AREA LENGTH: 49 ML/M2 (ref 16–34)
ECHO LA VOLUME INDEX MOD A2C: 48 ML/M2 (ref 16–34)
ECHO LA VOLUME INDEX MOD A4C: 45 ML/M2 (ref 16–34)
ECHO LV EDV A2C: 187 ML
ECHO LV EDV A4C: 166 ML
ECHO LV EDV BP: 180 ML (ref 67–155)
ECHO LV EDV INDEX A4C: 75 ML/M2
ECHO LV EDV INDEX BP: 81 ML/M2
ECHO LV EDV NDEX A2C: 85 ML/M2
ECHO LV EF PHYSICIAN: 38 %
ECHO LV EJECTION FRACTION A2C: 38 %
ECHO LV EJECTION FRACTION A4C: 38 %
ECHO LV EJECTION FRACTION BIPLANE: 38 % (ref 55–100)
ECHO LV ESV A2C: 116 ML
ECHO LV ESV A4C: 103 ML
ECHO LV ESV BP: 111 ML (ref 22–58)
ECHO LV ESV INDEX A2C: 52 ML/M2
ECHO LV ESV INDEX A4C: 47 ML/M2
ECHO LV ESV INDEX BP: 50 ML/M2
ECHO LV FRACTIONAL SHORTENING: 21 % (ref 28–44)
ECHO LV INTERNAL DIMENSION DIASTOLE INDEX: 2.58 CM/M2
ECHO LV INTERNAL DIMENSION DIASTOLIC: 5.7 CM (ref 4.2–5.9)
ECHO LV INTERNAL DIMENSION SYSTOLIC INDEX: 2.04 CM/M2
ECHO LV INTERNAL DIMENSION SYSTOLIC: 4.5 CM
ECHO LV IVSD: 1.1 CM (ref 0.6–1)
ECHO LV IVSS: 1.3 CM
ECHO LV MASS 2D: 256.7 G (ref 88–224)
ECHO LV MASS INDEX 2D: 116.2 G/M2 (ref 49–115)
ECHO LV POSTERIOR WALL DIASTOLIC: 1.1 CM (ref 0.6–1)
ECHO LV POSTERIOR WALL SYSTOLIC: 1.3 CM
ECHO LV RELATIVE WALL THICKNESS RATIO: 0.39
ECHO RIGHT VENTRICULAR SYSTOLIC PRESSURE (RVSP): 36 MMHG
ECHO RV INTERNAL DIMENSION: 4 CM
ECHO RV TAPSE: 1.7 CM (ref 1.7–?)
ECHO TV REGURGITANT MAX VELOCITY: 2.89 M/S
ECHO TV REGURGITANT PEAK GRADIENT: 33 MMHG
EOSINOPHIL # BLD: 0.23 K/UL (ref 0.05–0.5)
EOSINOPHILS RELATIVE PERCENT: 5 % (ref 0–6)
ERYTHROCYTE [DISTWIDTH] IN BLOOD BY AUTOMATED COUNT: 15.1 % (ref 11.5–15)
GFR, ESTIMATED: 21 ML/MIN/1.73M2
GLUCOSE BLD-MCNC: 115 MG/DL (ref 74–99)
GLUCOSE BLD-MCNC: 120 MG/DL (ref 74–99)
GLUCOSE BLD-MCNC: 127 MG/DL (ref 74–99)
GLUCOSE BLD-MCNC: 92 MG/DL (ref 74–99)
GLUCOSE SERPL-MCNC: 121 MG/DL (ref 74–99)
HCT VFR BLD AUTO: 24 % (ref 37–54)
HCT VFR BLD AUTO: 28.5 % (ref 37–54)
HGB BLD-MCNC: 7.6 G/DL (ref 12.5–16.5)
HGB BLD-MCNC: 8.7 G/DL (ref 12.5–16.5)
IMM GRANULOCYTES # BLD AUTO: <0.03 K/UL (ref 0–0.58)
IMM GRANULOCYTES NFR BLD: 0 % (ref 0–5)
LYMPHOCYTES NFR BLD: 0.72 K/UL (ref 1.5–4)
LYMPHOCYTES RELATIVE PERCENT: 16 % (ref 20–42)
MAGNESIUM SERPL-MCNC: 2.5 MG/DL (ref 1.6–2.4)
MCH RBC QN AUTO: 25.9 PG (ref 26–35)
MCHC RBC AUTO-ENTMCNC: 31.7 G/DL (ref 32–34.5)
MCV RBC AUTO: 81.9 FL (ref 80–99.9)
MONOCYTES NFR BLD: 0.42 K/UL (ref 0.1–0.95)
MONOCYTES NFR BLD: 9 % (ref 2–12)
NEUTROPHILS NFR BLD: 69 % (ref 43–80)
NEUTS SEG NFR BLD: 3.09 K/UL (ref 1.8–7.3)
PHOSPHATE SERPL-MCNC: 4.6 MG/DL (ref 2.5–4.5)
PLATELET # BLD AUTO: 154 K/UL (ref 130–450)
PMV BLD AUTO: 12.1 FL (ref 7–12)
POTASSIUM SERPL-SCNC: 3.9 MMOL/L (ref 3.5–5.1)
RBC # BLD AUTO: 2.93 M/UL (ref 3.8–5.8)
SODIUM SERPL-SCNC: 134 MMOL/L (ref 136–145)
WBC OTHER # BLD: 4.5 K/UL (ref 4.5–11.5)

## 2025-07-22 PROCEDURE — 80048 BASIC METABOLIC PNL TOTAL CA: CPT

## 2025-07-22 PROCEDURE — 83735 ASSAY OF MAGNESIUM: CPT

## 2025-07-22 PROCEDURE — 85018 HEMOGLOBIN: CPT

## 2025-07-22 PROCEDURE — 6370000000 HC RX 637 (ALT 250 FOR IP)

## 2025-07-22 PROCEDURE — 82962 GLUCOSE BLOOD TEST: CPT

## 2025-07-22 PROCEDURE — 99232 SBSQ HOSP IP/OBS MODERATE 35: CPT | Performed by: STUDENT IN AN ORGANIZED HEALTH CARE EDUCATION/TRAINING PROGRAM

## 2025-07-22 PROCEDURE — 6370000000 HC RX 637 (ALT 250 FOR IP): Performed by: STUDENT IN AN ORGANIZED HEALTH CARE EDUCATION/TRAINING PROGRAM

## 2025-07-22 PROCEDURE — 6370000000 HC RX 637 (ALT 250 FOR IP): Performed by: INTERNAL MEDICINE

## 2025-07-22 PROCEDURE — P9016 RBC LEUKOCYTES REDUCED: HCPCS

## 2025-07-22 PROCEDURE — 6360000002 HC RX W HCPCS: Performed by: INTERNAL MEDICINE

## 2025-07-22 PROCEDURE — 99233 SBSQ HOSP IP/OBS HIGH 50: CPT | Performed by: INTERNAL MEDICINE

## 2025-07-22 PROCEDURE — 93306 TTE W/DOPPLER COMPLETE: CPT | Performed by: INTERNAL MEDICINE

## 2025-07-22 PROCEDURE — 2580000003 HC RX 258: Performed by: INTERNAL MEDICINE

## 2025-07-22 PROCEDURE — 84100 ASSAY OF PHOSPHORUS: CPT

## 2025-07-22 PROCEDURE — 94660 CPAP INITIATION&MGMT: CPT

## 2025-07-22 PROCEDURE — 85014 HEMATOCRIT: CPT

## 2025-07-22 PROCEDURE — 2500000003 HC RX 250 WO HCPCS

## 2025-07-22 PROCEDURE — 85025 COMPLETE CBC W/AUTO DIFF WBC: CPT

## 2025-07-22 PROCEDURE — 36430 TRANSFUSION BLD/BLD COMPNT: CPT

## 2025-07-22 PROCEDURE — 2060000000 HC ICU INTERMEDIATE R&B

## 2025-07-22 RX ORDER — SODIUM CHLORIDE 9 MG/ML
INJECTION, SOLUTION INTRAVENOUS CONTINUOUS
Status: ACTIVE | OUTPATIENT
Start: 2025-07-22 | End: 2025-07-22

## 2025-07-22 RX ORDER — SODIUM CHLORIDE 9 MG/ML
INJECTION, SOLUTION INTRAVENOUS PRN
Status: DISCONTINUED | OUTPATIENT
Start: 2025-07-22 | End: 2025-07-24 | Stop reason: HOSPADM

## 2025-07-22 RX ADMIN — MIDODRINE HYDROCHLORIDE 5 MG: 5 TABLET ORAL at 10:22

## 2025-07-22 RX ADMIN — MIDODRINE HYDROCHLORIDE 5 MG: 5 TABLET ORAL at 15:39

## 2025-07-22 RX ADMIN — INSULIN GLARGINE 15 UNITS: 100 INJECTION, SOLUTION SUBCUTANEOUS at 21:21

## 2025-07-22 RX ADMIN — LORAZEPAM 0.5 MG: 0.5 TABLET ORAL at 05:35

## 2025-07-22 RX ADMIN — SODIUM CHLORIDE: 0.9 INJECTION, SOLUTION INTRAVENOUS at 09:08

## 2025-07-22 RX ADMIN — ISOSORBIDE MONONITRATE 30 MG: 30 TABLET, EXTENDED RELEASE ORAL at 08:08

## 2025-07-22 RX ADMIN — RIVAROXABAN 15 MG: 15 TABLET, FILM COATED ORAL at 08:08

## 2025-07-22 RX ADMIN — SODIUM CHLORIDE: 0.9 INJECTION, SOLUTION INTRAVENOUS at 21:57

## 2025-07-22 RX ADMIN — SODIUM CHLORIDE, PRESERVATIVE FREE 10 ML: 5 INJECTION INTRAVENOUS at 21:20

## 2025-07-22 RX ADMIN — LORAZEPAM 0.5 MG: 0.5 TABLET ORAL at 21:20

## 2025-07-22 RX ADMIN — MIDODRINE HYDROCHLORIDE 5 MG: 5 TABLET ORAL at 08:08

## 2025-07-22 RX ADMIN — DOBUTAMINE HYDROCHLORIDE 5 MCG/KG/MIN: 400 INJECTION INTRAVENOUS at 14:55

## 2025-07-22 RX ADMIN — ROSUVASTATIN CALCIUM 10 MG: 10 TABLET, FILM COATED ORAL at 08:08

## 2025-07-22 ASSESSMENT — PAIN DESCRIPTION - LOCATION: LOCATION: BACK;SHOULDER

## 2025-07-22 ASSESSMENT — PAIN DESCRIPTION - DESCRIPTORS: DESCRIPTORS: ACHING;DISCOMFORT;DULL;HEAVINESS

## 2025-07-22 ASSESSMENT — PAIN SCALES - GENERAL
PAINLEVEL_OUTOF10: 0
PAINLEVEL_OUTOF10: 5

## 2025-07-22 ASSESSMENT — PAIN DESCRIPTION - ORIENTATION: ORIENTATION: LEFT

## 2025-07-22 NOTE — PLAN OF CARE
Problem: ABCDS Injury Assessment  Goal: Absence of physical injury  7/22/2025 0820 by Tia Gonzalez RN  Outcome: Progressing     Problem: Discharge Planning  Goal: Discharge to home or other facility with appropriate resources  7/22/2025 0820 by Tia Gonzalez RN  Outcome: Progressing     Problem: Safety - Adult  Goal: Free from fall injury  7/22/2025 0820 by Tia Gonzalez RN  Outcome: Progressing     Problem: Chronic Conditions and Co-morbidities  Goal: Patient's chronic conditions and co-morbidity symptoms are monitored and maintained or improved  7/22/2025 0820 by Tia Gonzalez RN  Outcome: Progressing     Problem: Pain  Goal: Verbalizes/displays adequate comfort level or baseline comfort level  7/22/2025 0820 by Tia Gonzalez RN  Outcome: Progressing

## 2025-07-22 NOTE — PLAN OF CARE
Problem: ABCDS Injury Assessment  Goal: Absence of physical injury  7/22/2025 0119 by Shamika Rosa, RN  Outcome: Progressing     Problem: Discharge Planning  Goal: Discharge to home or other facility with appropriate resources  Outcome: Progressing     Problem: Safety - Adult  Goal: Free from fall injury  7/22/2025 0119 by Shamika Rosa, RN  Outcome: Progressing     Problem: Chronic Conditions and Co-morbidities  Goal: Patient's chronic conditions and co-morbidity symptoms are monitored and maintained or improved  7/22/2025 0119 by Shamika Rosa RN  Outcome: Progressing     Problem: Pain  Goal: Verbalizes/displays adequate comfort level or baseline comfort level  Outcome: Progressing

## 2025-07-22 NOTE — CARE COORDINATION
CARE MANAGEMENT.... Met with patient and family at the bedside. Confirmed plan remains home with [facility]. Will need orders once needs finalized. PT/OT following. Cardio/Pulm/Renal/Palliative Care following. Monitoring labs/vitals. Still hypotensive. Remains on iv dobutamine gtt. Adjusting meds accordingly. On midodrine tid. Received 1 unit PRBC today. On xarelto pta. Iv ferrlecit q MWF. Verified with Nikki from [facility] that Bipap will be available at discharge. Will need to contact [facility] when discharge is anticipated to coordinate delivery of bipap ([phone]). Will cont to follow along.

## 2025-07-23 ENCOUNTER — APPOINTMENT (OUTPATIENT)
Dept: GENERAL RADIOLOGY | Age: 85
DRG: 291 | End: 2025-07-23
Payer: MEDICARE

## 2025-07-23 LAB
ABO/RH: NORMAL
ANION GAP SERPL CALCULATED.3IONS-SCNC: 14 MMOL/L (ref 7–16)
ANION GAP SERPL CALCULATED.3IONS-SCNC: 16 MMOL/L (ref 7–16)
ANTIBODY SCREEN: NEGATIVE
ARM BAND NUMBER: NORMAL
BASOPHILS # BLD: 0.02 K/UL (ref 0–0.2)
BASOPHILS NFR BLD: 0 % (ref 0–2)
BLOOD BANK BLOOD PRODUCT EXPIRATION DATE: NORMAL
BLOOD BANK BLOOD PRODUCT EXPIRATION DATE: NORMAL
BLOOD BANK DISPENSE STATUS: NORMAL
BLOOD BANK DISPENSE STATUS: NORMAL
BLOOD BANK ISBT PRODUCT BLOOD TYPE: 7300
BLOOD BANK ISBT PRODUCT BLOOD TYPE: 7300
BLOOD BANK PRODUCT CODE: NORMAL
BLOOD BANK PRODUCT CODE: NORMAL
BLOOD BANK SAMPLE EXPIRATION: NORMAL
BLOOD BANK UNIT TYPE AND RH: NORMAL
BLOOD BANK UNIT TYPE AND RH: NORMAL
BNP SERPL-MCNC: ABNORMAL PG/ML (ref 0–450)
BPU ID: NORMAL
BPU ID: NORMAL
BUN SERPL-MCNC: 102 MG/DL (ref 8–23)
BUN SERPL-MCNC: 97 MG/DL (ref 8–23)
CALCIUM SERPL-MCNC: 8.8 MG/DL (ref 8.8–10.2)
CALCIUM SERPL-MCNC: 9 MG/DL (ref 8.8–10.2)
CHLORIDE SERPL-SCNC: 97 MMOL/L (ref 98–107)
CHLORIDE SERPL-SCNC: 98 MMOL/L (ref 98–107)
CHLORIDE UR-SCNC: <20 MMOL/L
CO2 SERPL-SCNC: 18 MMOL/L (ref 22–29)
CO2 SERPL-SCNC: 18 MMOL/L (ref 22–29)
COMPONENT: NORMAL
COMPONENT: NORMAL
CREAT SERPL-MCNC: 5.2 MG/DL (ref 0.7–1.2)
CREAT SERPL-MCNC: 5.4 MG/DL (ref 0.7–1.2)
CREAT UR-MCNC: 234 MG/DL (ref 40–278)
CROSSMATCH RESULT: NORMAL
CROSSMATCH RESULT: NORMAL
EOSINOPHIL # BLD: 0.23 K/UL (ref 0.05–0.5)
EOSINOPHILS RELATIVE PERCENT: 4 % (ref 0–6)
ERYTHROCYTE [DISTWIDTH] IN BLOOD BY AUTOMATED COUNT: 15.1 % (ref 11.5–15)
GFR, ESTIMATED: 10 ML/MIN/1.73M2
GFR, ESTIMATED: 10 ML/MIN/1.73M2
GLUCOSE BLD-MCNC: 103 MG/DL (ref 74–99)
GLUCOSE BLD-MCNC: 107 MG/DL (ref 74–99)
GLUCOSE BLD-MCNC: 148 MG/DL (ref 74–99)
GLUCOSE BLD-MCNC: 91 MG/DL (ref 74–99)
GLUCOSE SERPL-MCNC: 111 MG/DL (ref 74–99)
GLUCOSE SERPL-MCNC: 124 MG/DL (ref 74–99)
HCT VFR BLD AUTO: 27.2 % (ref 37–54)
HGB BLD-MCNC: 8.7 G/DL (ref 12.5–16.5)
IMM GRANULOCYTES # BLD AUTO: 0.03 K/UL (ref 0–0.58)
IMM GRANULOCYTES NFR BLD: 1 % (ref 0–5)
LYMPHOCYTES NFR BLD: 0.76 K/UL (ref 1.5–4)
LYMPHOCYTES RELATIVE PERCENT: 14 % (ref 20–42)
MAGNESIUM SERPL-MCNC: 2.8 MG/DL (ref 1.6–2.4)
MCH RBC QN AUTO: 25.7 PG (ref 26–35)
MCHC RBC AUTO-ENTMCNC: 32 G/DL (ref 32–34.5)
MCV RBC AUTO: 80.5 FL (ref 80–99.9)
MONOCYTES NFR BLD: 0.56 K/UL (ref 0.1–0.95)
MONOCYTES NFR BLD: 11 % (ref 2–12)
NEUTROPHILS NFR BLD: 70 % (ref 43–80)
NEUTS SEG NFR BLD: 3.68 K/UL (ref 1.8–7.3)
PHOSPHATE SERPL-MCNC: 5.3 MG/DL (ref 2.5–4.5)
PLATELET # BLD AUTO: 164 K/UL (ref 130–450)
PMV BLD AUTO: 11.8 FL (ref 7–12)
POTASSIUM SERPL-SCNC: 4.7 MMOL/L (ref 3.5–5.1)
POTASSIUM SERPL-SCNC: 4.8 MMOL/L (ref 3.5–5.1)
RBC # BLD AUTO: 3.38 M/UL (ref 3.8–5.8)
SODIUM SERPL-SCNC: 129 MMOL/L (ref 136–145)
SODIUM SERPL-SCNC: 131 MMOL/L (ref 136–145)
SODIUM UR-SCNC: <20 MMOL/L
TRANSFUSION STATUS: NORMAL
TRANSFUSION STATUS: NORMAL
UNIT DIVISION: 0
UNIT DIVISION: 0
UNIT ISSUE DATE/TIME: NORMAL
UNIT ISSUE DATE/TIME: NORMAL
UUN UR-MCNC: 391 MG/DL (ref 800–1666)
WBC OTHER # BLD: 5.3 K/UL (ref 4.5–11.5)

## 2025-07-23 PROCEDURE — 84100 ASSAY OF PHOSPHORUS: CPT

## 2025-07-23 PROCEDURE — 6370000000 HC RX 637 (ALT 250 FOR IP): Performed by: STUDENT IN AN ORGANIZED HEALTH CARE EDUCATION/TRAINING PROGRAM

## 2025-07-23 PROCEDURE — 84300 ASSAY OF URINE SODIUM: CPT

## 2025-07-23 PROCEDURE — 82570 ASSAY OF URINE CREATININE: CPT

## 2025-07-23 PROCEDURE — 6370000000 HC RX 637 (ALT 250 FOR IP): Performed by: INTERNAL MEDICINE

## 2025-07-23 PROCEDURE — 6360000002 HC RX W HCPCS

## 2025-07-23 PROCEDURE — 99233 SBSQ HOSP IP/OBS HIGH 50: CPT | Performed by: STUDENT IN AN ORGANIZED HEALTH CARE EDUCATION/TRAINING PROGRAM

## 2025-07-23 PROCEDURE — 82962 GLUCOSE BLOOD TEST: CPT

## 2025-07-23 PROCEDURE — 83880 ASSAY OF NATRIURETIC PEPTIDE: CPT

## 2025-07-23 PROCEDURE — 6360000002 HC RX W HCPCS: Performed by: INTERNAL MEDICINE

## 2025-07-23 PROCEDURE — 82436 ASSAY OF URINE CHLORIDE: CPT

## 2025-07-23 PROCEDURE — 2700000000 HC OXYGEN THERAPY PER DAY

## 2025-07-23 PROCEDURE — 94660 CPAP INITIATION&MGMT: CPT

## 2025-07-23 PROCEDURE — 99232 SBSQ HOSP IP/OBS MODERATE 35: CPT | Performed by: NURSE PRACTITIONER

## 2025-07-23 PROCEDURE — 99233 SBSQ HOSP IP/OBS HIGH 50: CPT | Performed by: INTERNAL MEDICINE

## 2025-07-23 PROCEDURE — 85025 COMPLETE CBC W/AUTO DIFF WBC: CPT

## 2025-07-23 PROCEDURE — 71045 X-RAY EXAM CHEST 1 VIEW: CPT

## 2025-07-23 PROCEDURE — 2060000000 HC ICU INTERMEDIATE R&B

## 2025-07-23 PROCEDURE — 6360000002 HC RX W HCPCS: Performed by: NURSE PRACTITIONER

## 2025-07-23 PROCEDURE — 83735 ASSAY OF MAGNESIUM: CPT

## 2025-07-23 PROCEDURE — 84540 ASSAY OF URINE/UREA-N: CPT

## 2025-07-23 PROCEDURE — 6370000000 HC RX 637 (ALT 250 FOR IP)

## 2025-07-23 PROCEDURE — 80048 BASIC METABOLIC PNL TOTAL CA: CPT

## 2025-07-23 PROCEDURE — 2500000003 HC RX 250 WO HCPCS

## 2025-07-23 RX ORDER — SENNA AND DOCUSATE SODIUM 50; 8.6 MG/1; MG/1
2 TABLET, FILM COATED ORAL 2 TIMES DAILY
Status: DISCONTINUED | OUTPATIENT
Start: 2025-07-23 | End: 2025-07-24 | Stop reason: HOSPADM

## 2025-07-23 RX ORDER — DOPAMINE HYDROCHLORIDE 160 MG/100ML
1-20 INJECTION, SOLUTION INTRAVENOUS CONTINUOUS
Status: DISCONTINUED | OUTPATIENT
Start: 2025-07-23 | End: 2025-07-23

## 2025-07-23 RX ORDER — MORPHINE SULFATE 2 MG/ML
2 INJECTION, SOLUTION INTRAMUSCULAR; INTRAVENOUS
Refills: 0 | Status: DISCONTINUED | OUTPATIENT
Start: 2025-07-23 | End: 2025-07-24 | Stop reason: HOSPADM

## 2025-07-23 RX ORDER — OXYCODONE HYDROCHLORIDE 5 MG/1
2.5 TABLET ORAL EVERY 6 HOURS PRN
Refills: 0 | Status: DISCONTINUED | OUTPATIENT
Start: 2025-07-23 | End: 2025-07-24 | Stop reason: HOSPADM

## 2025-07-23 RX ORDER — DOPAMINE HYDROCHLORIDE 320 MG/100ML
1-20 INJECTION, SOLUTION INTRAVENOUS CONTINUOUS
Status: DISCONTINUED | OUTPATIENT
Start: 2025-07-23 | End: 2025-07-24

## 2025-07-23 RX ORDER — MIDAZOLAM HYDROCHLORIDE 2 MG/2ML
1 INJECTION, SOLUTION INTRAMUSCULAR; INTRAVENOUS EVERY 4 HOURS PRN
Status: DISCONTINUED | OUTPATIENT
Start: 2025-07-23 | End: 2025-07-24 | Stop reason: HOSPADM

## 2025-07-23 RX ORDER — MORPHINE SULFATE 2 MG/ML
2 INJECTION, SOLUTION INTRAMUSCULAR; INTRAVENOUS EVERY 4 HOURS PRN
Refills: 0 | Status: DISCONTINUED | OUTPATIENT
Start: 2025-07-23 | End: 2025-07-23

## 2025-07-23 RX ORDER — PROCHLORPERAZINE EDISYLATE 5 MG/ML
10 INJECTION INTRAMUSCULAR; INTRAVENOUS EVERY 6 HOURS PRN
Status: DISCONTINUED | OUTPATIENT
Start: 2025-07-23 | End: 2025-07-24 | Stop reason: HOSPADM

## 2025-07-23 RX ORDER — MORPHINE SULFATE 4 MG/ML
4 INJECTION, SOLUTION INTRAMUSCULAR; INTRAVENOUS
Refills: 0 | Status: DISCONTINUED | OUTPATIENT
Start: 2025-07-23 | End: 2025-07-24 | Stop reason: HOSPADM

## 2025-07-23 RX ORDER — DOPAMINE HYDROCHLORIDE 320 MG/100ML
2.5 INJECTION, SOLUTION INTRAVENOUS CONTINUOUS
Status: DISCONTINUED | OUTPATIENT
Start: 2025-07-23 | End: 2025-07-23

## 2025-07-23 RX ORDER — GLYCOPYRROLATE 0.2 MG/ML
0.4 INJECTION INTRAMUSCULAR; INTRAVENOUS EVERY 4 HOURS PRN
Status: DISCONTINUED | OUTPATIENT
Start: 2025-07-23 | End: 2025-07-24 | Stop reason: HOSPADM

## 2025-07-23 RX ADMIN — MORPHINE SULFATE 2 MG: 2 INJECTION, SOLUTION INTRAMUSCULAR; INTRAVENOUS at 20:52

## 2025-07-23 RX ADMIN — MIDODRINE HYDROCHLORIDE 5 MG: 5 TABLET ORAL at 08:22

## 2025-07-23 RX ADMIN — MIDODRINE HYDROCHLORIDE 5 MG: 5 TABLET ORAL at 11:05

## 2025-07-23 RX ADMIN — ROSUVASTATIN CALCIUM 10 MG: 10 TABLET, FILM COATED ORAL at 08:22

## 2025-07-23 RX ADMIN — MIDODRINE HYDROCHLORIDE 5 MG: 5 TABLET ORAL at 15:55

## 2025-07-23 RX ADMIN — ISOSORBIDE MONONITRATE 30 MG: 30 TABLET, EXTENDED RELEASE ORAL at 08:22

## 2025-07-23 RX ADMIN — ONDANSETRON 4 MG: 2 INJECTION, SOLUTION INTRAMUSCULAR; INTRAVENOUS at 17:04

## 2025-07-23 RX ADMIN — SODIUM CHLORIDE, PRESERVATIVE FREE 10 ML: 5 INJECTION INTRAVENOUS at 20:10

## 2025-07-23 RX ADMIN — PROCHLORPERAZINE EDISYLATE 10 MG: 5 INJECTION INTRAMUSCULAR; INTRAVENOUS at 22:41

## 2025-07-23 RX ADMIN — SODIUM CHLORIDE, PRESERVATIVE FREE 10 ML: 5 INJECTION INTRAVENOUS at 08:23

## 2025-07-23 RX ADMIN — LORAZEPAM 0.5 MG: 0.5 TABLET ORAL at 11:05

## 2025-07-23 RX ADMIN — DOPAMINE HYDROCHLORIDE 5 MCG/KG/MIN: 320 INJECTION, SOLUTION INTRAVENOUS at 16:34

## 2025-07-23 RX ADMIN — RIVAROXABAN 15 MG: 15 TABLET, FILM COATED ORAL at 08:22

## 2025-07-23 RX ADMIN — DOCUSATE SODIUM 50 MG AND SENNOSIDES 8.6 MG 2 TABLET: 8.6; 5 TABLET, FILM COATED ORAL at 11:06

## 2025-07-23 ASSESSMENT — PAIN SCALES - GENERAL
PAINLEVEL_OUTOF10: 3
PAINLEVEL_OUTOF10: 0
PAINLEVEL_OUTOF10: 0

## 2025-07-23 ASSESSMENT — PAIN DESCRIPTION - ORIENTATION: ORIENTATION: LEFT

## 2025-07-23 ASSESSMENT — PAIN DESCRIPTION - LOCATION: LOCATION: BACK;SHOULDER

## 2025-07-23 ASSESSMENT — PAIN DESCRIPTION - DESCRIPTORS: DESCRIPTORS: ACHING;DISCOMFORT;DULL

## 2025-07-23 NOTE — PLAN OF CARE
Problem: ABCDS Injury Assessment  Goal: Absence of physical injury  Outcome: Progressing     Problem: Discharge Planning  Goal: Discharge to home or other facility with appropriate resources  Outcome: Progressing  Flowsheets (Taken 7/22/2025 2110)  Discharge to home or other facility with appropriate resources: Identify barriers to discharge with patient and caregiver     Problem: Safety - Adult  Goal: Free from fall injury  Outcome: Progressing     Problem: Chronic Conditions and Co-morbidities  Goal: Patient's chronic conditions and co-morbidity symptoms are monitored and maintained or improved  Outcome: Progressing  Flowsheets (Taken 7/22/2025 2110)  Care Plan - Patient's Chronic Conditions and Co-Morbidity Symptoms are Monitored and Maintained or Improved: Monitor and assess patient's chronic conditions and comorbid symptoms for stability, deterioration, or improvement     Problem: Pain  Goal: Verbalizes/displays adequate comfort level or baseline comfort level  Outcome: Progressing

## 2025-07-23 NOTE — CARE COORDINATION
CARE MANAGEMENT.... Chart reviewed. Patients resting quietly in bed with family at the bedside. Spoke with daughter, Aishwarya. Plan is to speak with Dr Pina and Palliative regarding further plans. For now, continue to monitor labs/vitals closely. Hgb today 8.7 after 1 unit PRBC yesterday. Remains on iv dobutamine gtt, midodrine and iv ferrlecit q MWF. Discharge plans TBD. Will cont to follow along and assist with needs accordingly. Emotional support and understanding provided.

## 2025-07-23 NOTE — PLAN OF CARE
Problem: ABCDS Injury Assessment  Goal: Absence of physical injury  7/23/2025 0936 by Tia Gonzalez RN  Outcome: Progressing     Problem: Discharge Planning  Goal: Discharge to home or other facility with appropriate resources  7/23/2025 0936 by Tia Gonzalez RN  Outcome: Progressing     Problem: Safety - Adult  Goal: Free from fall injury  7/23/2025 0936 by Tia Gonzalez RN  Outcome: Progressing     Problem: Chronic Conditions and Co-morbidities  Goal: Patient's chronic conditions and co-morbidity symptoms are monitored and maintained or improved  7/23/2025 0936 by Tia Gonzalez RN  Outcome: Progressing     Problem: Pain  Goal: Verbalizes/displays adequate comfort level or baseline comfort level  7/23/2025 0936 by Tia Gonzalez RN  Outcome: Progressing

## 2025-07-23 NOTE — CARE COORDINATION
CARE MANAGEMENT... Order noted for hospice. Patient/family chose [facility]. Referral sent via [facility]. Will cont to follow along.       UPDATE, 3:40 pm..... Family meeting with [facility] tomorrow at 09:30.

## 2025-07-24 VITALS
HEART RATE: 59 BPM | BODY MASS INDEX: 42.4 KG/M2 | TEMPERATURE: 97.5 F | HEIGHT: 66 IN | OXYGEN SATURATION: 100 % | RESPIRATION RATE: 20 BRPM | DIASTOLIC BLOOD PRESSURE: 47 MMHG | WEIGHT: 263.8 LBS | SYSTOLIC BLOOD PRESSURE: 102 MMHG

## 2025-07-24 LAB
ANION GAP SERPL CALCULATED.3IONS-SCNC: 16 MMOL/L (ref 7–16)
BASOPHILS # BLD: 0.07 K/UL (ref 0–0.2)
BASOPHILS NFR BLD: 1 % (ref 0–2)
BUN SERPL-MCNC: 109 MG/DL (ref 8–23)
CALCIUM SERPL-MCNC: 9.2 MG/DL (ref 8.8–10.2)
CHLORIDE SERPL-SCNC: 98 MMOL/L (ref 98–107)
CO2 SERPL-SCNC: 16 MMOL/L (ref 22–29)
CREAT SERPL-MCNC: 5.9 MG/DL (ref 0.7–1.2)
EOSINOPHIL # BLD: 0.27 K/UL (ref 0.05–0.5)
EOSINOPHILS RELATIVE PERCENT: 3 % (ref 0–6)
ERYTHROCYTE [DISTWIDTH] IN BLOOD BY AUTOMATED COUNT: 15.7 % (ref 11.5–15)
GFR, ESTIMATED: 9 ML/MIN/1.73M2
GLUCOSE BLD-MCNC: 127 MG/DL (ref 74–99)
GLUCOSE BLD-MCNC: 131 MG/DL (ref 74–99)
GLUCOSE BLD-MCNC: 137 MG/DL (ref 74–99)
GLUCOSE SERPL-MCNC: 127 MG/DL (ref 74–99)
HCT VFR BLD AUTO: 30.2 % (ref 37–54)
HGB BLD-MCNC: 9.6 G/DL (ref 12.5–16.5)
IMM GRANULOCYTES # BLD AUTO: 0.04 K/UL (ref 0–0.58)
IMM GRANULOCYTES NFR BLD: 0 % (ref 0–5)
LYMPHOCYTES NFR BLD: 1.11 K/UL (ref 1.5–4)
LYMPHOCYTES RELATIVE PERCENT: 11 % (ref 20–42)
MAGNESIUM SERPL-MCNC: 3 MG/DL (ref 1.6–2.4)
MCH RBC QN AUTO: 25.6 PG (ref 26–35)
MCHC RBC AUTO-ENTMCNC: 31.8 G/DL (ref 32–34.5)
MCV RBC AUTO: 80.5 FL (ref 80–99.9)
MONOCYTES NFR BLD: 1.04 K/UL (ref 0.1–0.95)
MONOCYTES NFR BLD: 11 % (ref 2–12)
NEUTROPHILS NFR BLD: 75 % (ref 43–80)
NEUTS SEG NFR BLD: 7.37 K/UL (ref 1.8–7.3)
PHOSPHATE SERPL-MCNC: 6.2 MG/DL (ref 2.5–4.5)
PLATELET # BLD AUTO: 193 K/UL (ref 130–450)
PMV BLD AUTO: 12.3 FL (ref 7–12)
POTASSIUM SERPL-SCNC: 5.5 MMOL/L (ref 3.5–5.1)
RBC # BLD AUTO: 3.75 M/UL (ref 3.8–5.8)
SODIUM SERPL-SCNC: 129 MMOL/L (ref 136–145)
WBC OTHER # BLD: 9.9 K/UL (ref 4.5–11.5)

## 2025-07-24 PROCEDURE — 6370000000 HC RX 637 (ALT 250 FOR IP): Performed by: NURSE PRACTITIONER

## 2025-07-24 PROCEDURE — 99239 HOSP IP/OBS DSCHRG MGMT >30: CPT | Performed by: STUDENT IN AN ORGANIZED HEALTH CARE EDUCATION/TRAINING PROGRAM

## 2025-07-24 PROCEDURE — 6370000000 HC RX 637 (ALT 250 FOR IP): Performed by: STUDENT IN AN ORGANIZED HEALTH CARE EDUCATION/TRAINING PROGRAM

## 2025-07-24 PROCEDURE — 99231 SBSQ HOSP IP/OBS SF/LOW 25: CPT | Performed by: NURSE PRACTITIONER

## 2025-07-24 PROCEDURE — 6370000000 HC RX 637 (ALT 250 FOR IP)

## 2025-07-24 PROCEDURE — 85025 COMPLETE CBC W/AUTO DIFF WBC: CPT

## 2025-07-24 PROCEDURE — 84100 ASSAY OF PHOSPHORUS: CPT

## 2025-07-24 PROCEDURE — 6360000002 HC RX W HCPCS: Performed by: NURSE PRACTITIONER

## 2025-07-24 PROCEDURE — 6360000002 HC RX W HCPCS

## 2025-07-24 PROCEDURE — 6370000000 HC RX 637 (ALT 250 FOR IP): Performed by: INTERNAL MEDICINE

## 2025-07-24 PROCEDURE — 2500000003 HC RX 250 WO HCPCS

## 2025-07-24 PROCEDURE — 83735 ASSAY OF MAGNESIUM: CPT

## 2025-07-24 PROCEDURE — 94660 CPAP INITIATION&MGMT: CPT

## 2025-07-24 PROCEDURE — 82962 GLUCOSE BLOOD TEST: CPT

## 2025-07-24 PROCEDURE — 80048 BASIC METABOLIC PNL TOTAL CA: CPT

## 2025-07-24 PROCEDURE — 2700000000 HC OXYGEN THERAPY PER DAY

## 2025-07-24 PROCEDURE — 99233 SBSQ HOSP IP/OBS HIGH 50: CPT | Performed by: INTERNAL MEDICINE

## 2025-07-24 PROCEDURE — 36415 COLL VENOUS BLD VENIPUNCTURE: CPT

## 2025-07-24 RX ORDER — PANTOPRAZOLE SODIUM 40 MG/1
40 TABLET, DELAYED RELEASE ORAL DAILY
Qty: 30 TABLET | Refills: 3 | Status: SHIPPED | OUTPATIENT
Start: 2025-07-25

## 2025-07-24 RX ORDER — MIDODRINE HYDROCHLORIDE 5 MG/1
10 TABLET ORAL
Status: DISCONTINUED | OUTPATIENT
Start: 2025-07-24 | End: 2025-07-24

## 2025-07-24 RX ORDER — LORAZEPAM 0.5 MG/1
0.5 TABLET ORAL EVERY 8 HOURS PRN
Qty: 30 TABLET | Refills: 0 | Status: SHIPPED | OUTPATIENT
Start: 2025-07-24 | End: 2025-08-23

## 2025-07-24 RX ORDER — ROSUVASTATIN CALCIUM 10 MG/1
10 TABLET, COATED ORAL DAILY
Qty: 30 TABLET | Refills: 3 | Status: SHIPPED | OUTPATIENT
Start: 2025-07-25

## 2025-07-24 RX ORDER — PANTOPRAZOLE SODIUM 40 MG/1
40 TABLET, DELAYED RELEASE ORAL DAILY
Status: DISCONTINUED | OUTPATIENT
Start: 2025-07-24 | End: 2025-07-24 | Stop reason: HOSPADM

## 2025-07-24 RX ORDER — OXYCODONE HYDROCHLORIDE 5 MG/1
2.5 TABLET ORAL EVERY 6 HOURS PRN
Qty: 6 TABLET | Refills: 0 | Status: SHIPPED | OUTPATIENT
Start: 2025-07-24 | End: 2025-07-27

## 2025-07-24 RX ORDER — MIDODRINE HYDROCHLORIDE 10 MG/1
10 TABLET ORAL
Qty: 90 TABLET | Refills: 3 | Status: SHIPPED | OUTPATIENT
Start: 2025-07-24

## 2025-07-24 RX ORDER — MIDODRINE HYDROCHLORIDE 5 MG/1
10 TABLET ORAL
Status: DISCONTINUED | OUTPATIENT
Start: 2025-07-24 | End: 2025-07-24 | Stop reason: HOSPADM

## 2025-07-24 RX ORDER — ISOSORBIDE MONONITRATE 30 MG/1
30 TABLET, EXTENDED RELEASE ORAL DAILY
Qty: 30 TABLET | Refills: 3 | Status: SHIPPED | OUTPATIENT
Start: 2025-07-25

## 2025-07-24 RX ADMIN — RIVAROXABAN 15 MG: 15 TABLET, FILM COATED ORAL at 08:44

## 2025-07-24 RX ADMIN — ONDANSETRON 4 MG: 2 INJECTION, SOLUTION INTRAMUSCULAR; INTRAVENOUS at 06:53

## 2025-07-24 RX ADMIN — SODIUM CHLORIDE, PRESERVATIVE FREE 10 ML: 5 INJECTION INTRAVENOUS at 08:49

## 2025-07-24 RX ADMIN — ISOSORBIDE MONONITRATE 30 MG: 30 TABLET, EXTENDED RELEASE ORAL at 08:47

## 2025-07-24 RX ADMIN — MIDODRINE HYDROCHLORIDE 5 MG: 5 TABLET ORAL at 08:44

## 2025-07-24 RX ADMIN — MORPHINE SULFATE 2 MG: 2 INJECTION, SOLUTION INTRAMUSCULAR; INTRAVENOUS at 04:22

## 2025-07-24 RX ADMIN — MIDODRINE HYDROCHLORIDE 10 MG: 5 TABLET ORAL at 10:29

## 2025-07-24 RX ADMIN — ROSUVASTATIN CALCIUM 10 MG: 10 TABLET, FILM COATED ORAL at 08:47

## 2025-07-24 RX ADMIN — MORPHINE SULFATE 2 MG: 2 INJECTION, SOLUTION INTRAMUSCULAR; INTRAVENOUS at 00:32

## 2025-07-24 RX ADMIN — MIDODRINE HYDROCHLORIDE 10 MG: 5 TABLET ORAL at 16:12

## 2025-07-24 RX ADMIN — DOCUSATE SODIUM 50 MG AND SENNOSIDES 8.6 MG 2 TABLET: 8.6; 5 TABLET, FILM COATED ORAL at 08:48

## 2025-07-24 RX ADMIN — PANTOPRAZOLE SODIUM 40 MG: 40 TABLET, DELAYED RELEASE ORAL at 11:39

## 2025-07-24 ASSESSMENT — PAIN SCALES - GENERAL
PAINLEVEL_OUTOF10: 0

## 2025-07-24 NOTE — PLAN OF CARE
Problem: Discharge Planning  Goal: Discharge to home or other facility with appropriate resources  7/23/2025 2321 by Arjun Garcia RN  Outcome: Progressing     Problem: Safety - Adult  Goal: Free from fall injury  7/23/2025 2321 by Arjun Garcia RN  Outcome: Progressing     Problem: Pain  Goal: Verbalizes/displays adequate comfort level or baseline comfort level  7/23/2025 2321 by Arjun Garcia RN  Outcome: Progressing

## 2025-07-24 NOTE — DISCHARGE SUMMARY
[facility] Hospitalist Physician Discharge Summary       No follow-up provider specified.    Activity level: As tolerated     Dispo: Home with hospice      Condition on discharge: Stable     Patient ID:  Thee Fountain  [id number]  85 y.o.  1940    Admit date: 7/17/2025    Discharge date and time:  7/24/2025  4:22 PM    Admission Diagnoses: Principal Problem:    Congestive heart failure (HCC)  Active Problems:    Essential hypertension, benign    Type 2 diabetes mellitus with diabetic polyneuropathy, with long-term current use of insulin (HCC)    Hyperlipidemia    Acute kidney injury superimposed on chronic kidney disease    Coronary artery disease involving native coronary artery of native heart without angina pectoris    Chronic kidney disease, stage IV (severe) (HCC)    Paroxysmal atrial fibrillation (HCC)    OMARI (obstructive sleep apnea)    Morbid obesity with body mass index (BMI) of 40.0 to 44.9 in adult (HCC)    Prostate cancer (HCC)    Acute exacerbation of chronic heart failure (HCC)    Anemia due to chronic kidney disease    Permanent atrial fibrillation (HCC)    Ischemic cardiomyopathy    Palliative care encounter    Goals of care, counseling/discussion  Resolved Problems:    * No resolved hospital problems. *      Discharge Diagnoses: Principal Problem:    Congestive heart failure (HCC)  Active Problems:    Essential hypertension, benign    Type 2 diabetes mellitus with diabetic polyneuropathy, with long-term current use of insulin (HCC)    Hyperlipidemia    Acute kidney injury superimposed on chronic kidney disease    Coronary artery disease involving native coronary artery of native heart without angina pectoris    Chronic kidney disease, stage IV (severe) (HCC)    Paroxysmal atrial fibrillation (HCC)    OMARI (obstructive sleep apnea)    Morbid obesity with body mass index (BMI) of 40.0 to 44.9 in adult (HCC)    Prostate cancer (HCC)    Acute exacerbation of chronic heart failure (HCC)

## 2025-07-24 NOTE — CARE COORDINATION
CARE MANAGEMENT....[facility] met with patient and family. Plan is home with hospice. [facility] will make arrangements for equipment to be delivered, so he can hopefully be admitted to home later today. In the meantime, iv dopamine gtt was discontinued. Continue midodrine. Anticipate need for ambulance transport home d/t patients unstable bp's. He is on o2 2lnc. Placed patient on \"will call\" with [facility]. Transport forms in chart. Will follow    UPDATE, 12:40 pm... Spoke with daughter, Aishwarya, regarding dc plans. She said hospice anticipates delivering equipment today 2p-4p. Once equipment delivered and family is at the house, need to schedule transport with [facility] [phone]. Forms in chart.     UPDATE, 2:25 pm... Per family, dme being delivered to the house today 2p-4p. Ambulance transport arranged for 5:30 pm with [facility]. Forms in chat.

## 2025-07-24 NOTE — PROGRESS NOTES
Jimmy Murphy M.D.,[facility]  Jacky Somers D.O., LIA., [facility]  Mitchell Khan M.D.  Aixa Perez M.D.   Lisandro Collado D.O.  Arjun Trinidad M.D.         Daily Pulmonary Progress Note    Patient:  Thee Fountain 85 y.o. male MRN: [id number]            Synopsis     We are following patient for SOB, OMARI    \"CC\" SOB    Code status: FULL CODE      Subjective   7/17/25: \"Chief\" was seen today in the ED with his son-in-law and wife present. He is awake, alert an oriented and stable on room air with SpO2 95%. Legs do have some edema. Lungs clear on exam. He is able to speak in full sentences.     7/18/25: Patient was seen and examined sitting up in the chair on room air. He is on Bumex infusion and responding well with 1.3 L UOP charted. Creatinine down to 3.2.     11/21/25: lying in bed, family present.  Questioning his net output over the last 24 hours,- 35 mL.  Hgb 7.8 this morning.  Renal function continues to decline, BUN 89 creatinine 4, GFR 14.  proBNP of 14,079.  2D echo completed 7/18/25, reordered for today.  Family would like to proceed with palliative referral.    7/22/25: Examined at the bedside, awakened.  A little spunky today.  States he had poor sleep last night.  Refused PAP therapy, \"just wanting a break.\"  As he was sleeping upon arrival he was agreeable to place PAP therapy on at present.  Does not note any improvements after 1 unit of PRBCs yesterday.  Repeat Hgb this morning 7.6.  Dobutamine drip continues.  Repositioned, voiced appreciation    Review of Systems:  Constitutional: fatigue   Skin: Denies pigmentation, dark lesions, and rashes   HEENT: Denies hearing loss, tinnitus, ear drainage, epistaxis, sore throat, and hoarseness.  Cardiovascular: Denies palpitations, chest pain, and chest pressure.  Respiratory: dyspnea with minimal exertion, cough   Gastrointestinal: Denies nausea, vomiting, poor appetite, diarrhea, heartburn or reflux  Genitourinary: Denies dysuria, frequency, 
           Jimmy Murphy M.D.,[facility]  Jacky Somers D.O., LIA., [facility]  Mitchell Khan M.D.  Aixa Perez M.D.   Lisandro Collado D.O.  Arjun Trinidad M.D.         Daily Pulmonary Progress Note    Patient:  Thee Fountain 85 y.o. male MRN: [id number]            Synopsis     We are following patient for SOB, OMARI    \"CC\" SOB    Code status: FULL CODE      Subjective   7/17/25: \"Chief\" was seen today in the ED with his son-in-law and wife present. He is awake, alert an oriented and stable on room air with SpO2 95%. Legs do have some edema. Lungs clear on exam. He is able to speak in full sentences.     7/18/25: Patient was seen and examined sitting up in the chair on room air. He is on Bumex infusion and responding well with 1.3 L UOP charted. Creatinine down to 3.2.     11/21/25: lying in bed, family present.  Questioning his net output over the last 24 hours,- 35 mL.  Hgb 7.8 this morning.  Renal function continues to decline, BUN 89 creatinine 4, GFR 14.  proBNP of 14,079.  2D echo completed 7/18/25, reordered for today.  Family would like to proceed with palliative referral.    7/22/25: Examined at the bedside, awakened.  A little spunky today.  States he had poor sleep last night.  Refused PAP therapy, \"just wanting a break.\"  As he was sleeping upon arrival he was agreeable to place PAP therapy on at present.  Does not note any improvements after 1 unit of PRBCs yesterday.  Repeat Hgb this morning 7.6.  Dobutamine drip continues.  Repositioned, voiced appreciation    7/23/25: Examined at the bedside, family present.  Renal function continues upward trend, BUN 97, creatinine 5.2, GFR 10.  proBNP 16,962 Hgb 8.7.  Family states meeting with hospice tomorrow.  BiPAP machine from hospital at bedside, patient unable to tolerate, used home PAP therapy.    Review of Systems:  Constitutional: fatigue   Skin: Denies pigmentation, dark lesions, and rashes   HEENT: Denies hearing loss, tinnitus, ear drainage, 
       [facility] Hospitalist Progress Note    Admitting Date and Time: 7/17/2025 12:55 PM  Admit Dx: Acute renal failure, unspecified acute renal failure type [N17.9]  Congestive heart failure, unspecified HF chronicity, unspecified heart failure type (HCC) [I50.9]  Acute exacerbation of chronic heart failure (HCC) [I50.9]    Subjective:  Patient is being followed for Acute renal failure, unspecified acute renal failure type [N17.9]  Congestive heart failure, unspecified HF chronicity, unspecified heart failure type (HCC) [I50.9]  Acute exacerbation of chronic heart failure (HCC) [I50.9]   Pt feels better, seen sitting at bedside.  Per RN: No major concerns.    ROS: denies fever, chills, cp, sob, n/v, HA unless stated above.      ferric gluconate (FERRLECIT) 125 mg in sodium chloride 0.9 % 100 mL IVPB  125 mg IntraVENous Q MWF    carvedilol  3.125 mg Oral BID WC    isosorbide mononitrate  30 mg Oral Daily    sodium chloride flush  5-40 mL IntraVENous 2 times per day    [Held by provider] lisinopril  10 mg Oral Daily    rosuvastatin  20 mg Oral Daily    rivaroxaban  15 mg Oral Daily with breakfast    insulin lispro  0-4 Units SubCUTAneous 4x Daily AC & HS    insulin glargine  15 Units SubCUTAneous BID     sodium chloride flush, 5-40 mL, PRN  sodium chloride, , PRN  ondansetron, 4 mg, Q8H PRN   Or  ondansetron, 4 mg, Q6H PRN  polyethylene glycol, 17 g, Daily PRN  acetaminophen, 650 mg, Q6H PRN   Or  acetaminophen, 650 mg, Q6H PRN  glucose, 4 tablet, PRN  dextrose bolus, 125 mL, PRN   Or  dextrose bolus, 250 mL, PRN  glucagon (rDNA), 1 mg, PRN  dextrose, , Continuous PRN         Objective:    /67   Pulse 57   Temp 97.7 °F (36.5 °C)   Resp 18   Ht 1.676 m (5' 6\")   Wt 112.9 kg (248 lb 12.8 oz)   SpO2 96%   BMI 40.16 kg/m²     General Appearance: alert and oriented to person, place and time and in no acute distress, Obese  Skin: warm and dry  Head: normocephalic and atraumatic  Eyes: pupils equal, round, 
       [facility] Hospitalist Progress Note    Admitting Date and Time: 7/17/2025 12:55 PM  Admit Dx: Acute renal failure, unspecified acute renal failure type [N17.9]  Congestive heart failure, unspecified HF chronicity, unspecified heart failure type (HCC) [I50.9]  Acute exacerbation of chronic heart failure (HCC) [I50.9]    Subjective:  Patient is being followed for Acute renal failure, unspecified acute renal failure type [N17.9]  Congestive heart failure, unspecified HF chronicity, unspecified heart failure type (HCC) [I50.9]  Acute exacerbation of chronic heart failure (HCC) [I50.9]     Pt feels better, seen sitting at bedside, feels constipated.    7/21 - reports gained 4 lbs since last weight.  Per RN: No major concerns except low B.P.    ROS: denies fever, chills, cp, sob, n/v, HA unless stated above.      sennosides-docusate sodium  2 tablet Oral BID    midodrine  5 mg Oral TID WC    rosuvastatin  10 mg Oral Daily    [Held by provider] ferric gluconate (FERRLECIT) 125 mg in sodium chloride 0.9 % 100 mL IVPB  125 mg IntraVENous Q MWF    [Held by provider] carvedilol  3.125 mg Oral BID WC    isosorbide mononitrate  30 mg Oral Daily    sodium chloride flush  5-40 mL IntraVENous 2 times per day    [Held by provider] lisinopril  10 mg Oral Daily    rivaroxaban  15 mg Oral Daily with breakfast    insulin lispro  0-4 Units SubCUTAneous 4x Daily AC & HS    insulin glargine  15 Units SubCUTAneous BID     sodium chloride, , PRN  sulfur hexafluoride microspheres, 2 mL, ONCE PRN  sodium chloride, , PRN  LORazepam, 0.5 mg, Q8H PRN  sodium chloride flush, 5-40 mL, PRN  sodium chloride, , PRN  ondansetron, 4 mg, Q8H PRN   Or  ondansetron, 4 mg, Q6H PRN  polyethylene glycol, 17 g, Daily PRN  acetaminophen, 650 mg, Q6H PRN   Or  acetaminophen, 650 mg, Q6H PRN  glucose, 4 tablet, PRN  dextrose bolus, 125 mL, PRN   Or  dextrose bolus, 250 mL, PRN  glucagon (rDNA), 1 mg, PRN  dextrose, , Continuous PRN     
       [facility] Hospitalist Progress Note    Admitting Date and Time: 7/17/2025 12:55 PM  Admit Dx: Acute renal failure, unspecified acute renal failure type [N17.9]  Congestive heart failure, unspecified HF chronicity, unspecified heart failure type (HCC) [I50.9]  Acute exacerbation of chronic heart failure (HCC) [I50.9]    Subjective:  Patient is being followed for Acute renal failure, unspecified acute renal failure type [N17.9]  Congestive heart failure, unspecified HF chronicity, unspecified heart failure type (HCC) [I50.9]  Acute exacerbation of chronic heart failure (HCC) [I50.9]   Pt feels better, seen sitting at bedside, reports gained 4 lbs since last weight.  Per RN: No major concerns except low B.P.    ROS: denies fever, chills, cp, sob, n/v, HA unless stated above.      midodrine  5 mg Oral TID WC    ferric gluconate (FERRLECIT) 125 mg in sodium chloride 0.9 % 100 mL IVPB  125 mg IntraVENous Q MWF    [Held by provider] carvedilol  3.125 mg Oral BID WC    isosorbide mononitrate  30 mg Oral Daily    sodium chloride flush  5-40 mL IntraVENous 2 times per day    [Held by provider] lisinopril  10 mg Oral Daily    rosuvastatin  20 mg Oral Daily    rivaroxaban  15 mg Oral Daily with breakfast    insulin lispro  0-4 Units SubCUTAneous 4x Daily AC & HS    insulin glargine  15 Units SubCUTAneous BID     LORazepam, 0.5 mg, Q8H PRN  sodium chloride flush, 5-40 mL, PRN  sodium chloride, , PRN  ondansetron, 4 mg, Q8H PRN   Or  ondansetron, 4 mg, Q6H PRN  polyethylene glycol, 17 g, Daily PRN  acetaminophen, 650 mg, Q6H PRN   Or  acetaminophen, 650 mg, Q6H PRN  glucose, 4 tablet, PRN  dextrose bolus, 125 mL, PRN   Or  dextrose bolus, 250 mL, PRN  glucagon (rDNA), 1 mg, PRN  dextrose, , Continuous PRN         Objective:    BP (!) 78/44   Pulse 74   Temp 97.7 °F (36.5 °C) (Oral)   Resp 20   Ht 1.676 m (5' 6\")   Wt 112.9 kg (248 lb 12.8 oz)   SpO2 96%   BMI 40.16 kg/m²     General Appearance: alert and oriented 
       [facility] Hospitalist Progress Note    Admitting Date and Time: 7/17/2025 12:55 PM  Admit Dx: Acute renal failure, unspecified acute renal failure type [N17.9]  Congestive heart failure, unspecified HF chronicity, unspecified heart failure type (HCC) [I50.9]  Acute exacerbation of chronic heart failure (HCC) [I50.9]    Subjective:  Patient is being followed for Acute renal failure, unspecified acute renal failure type [N17.9]  Congestive heart failure, unspecified HF chronicity, unspecified heart failure type (HCC) [I50.9]  Acute exacerbation of chronic heart failure (HCC) [I50.9]   Pt feels better, seen sitting at bedside, feels his legs are lighter from since admission.    7/21 - reports gained 4 lbs since last weight.  Per RN: No major concerns except low B.P.    ROS: denies fever, chills, cp, sob, n/v, HA unless stated above.      midodrine  5 mg Oral TID WC    rosuvastatin  10 mg Oral Daily    ferric gluconate (FERRLECIT) 125 mg in sodium chloride 0.9 % 100 mL IVPB  125 mg IntraVENous Q MWF    [Held by provider] carvedilol  3.125 mg Oral BID WC    isosorbide mononitrate  30 mg Oral Daily    sodium chloride flush  5-40 mL IntraVENous 2 times per day    [Held by provider] lisinopril  10 mg Oral Daily    rivaroxaban  15 mg Oral Daily with breakfast    insulin lispro  0-4 Units SubCUTAneous 4x Daily AC & HS    insulin glargine  15 Units SubCUTAneous BID     sulfur hexafluoride microspheres, 2 mL, ONCE PRN  sodium chloride, , PRN  LORazepam, 0.5 mg, Q8H PRN  sodium chloride flush, 5-40 mL, PRN  sodium chloride, , PRN  ondansetron, 4 mg, Q8H PRN   Or  ondansetron, 4 mg, Q6H PRN  polyethylene glycol, 17 g, Daily PRN  acetaminophen, 650 mg, Q6H PRN   Or  acetaminophen, 650 mg, Q6H PRN  glucose, 4 tablet, PRN  dextrose bolus, 125 mL, PRN   Or  dextrose bolus, 250 mL, PRN  glucagon (rDNA), 1 mg, PRN  dextrose, , Continuous PRN         Objective:    BP 90/62   Pulse 72   Temp 98 °F (36.7 °C) (Oral)   Resp 18  
       [facility] Hospitalist Progress Note    Admitting Date and Time: 7/17/2025 12:55 PM  Admit Dx: Acute renal failure, unspecified acute renal failure type [N17.9]  Congestive heart failure, unspecified HF chronicity, unspecified heart failure type (HCC) [I50.9]  Acute exacerbation of chronic heart failure (HCC) [I50.9]    Subjective:  Patient is being followed for Acute renal failure, unspecified acute renal failure type [N17.9]  Congestive heart failure, unspecified HF chronicity, unspecified heart failure type (HCC) [I50.9]  Acute exacerbation of chronic heart failure (HCC) [I50.9]   Pt feels better, seen sitting at bedside, reports was not weighed this am, discussed with lior.  Per RN: No major concerns.    ROS: denies fever, chills, cp, sob, n/v, HA unless stated above.      ferric gluconate (FERRLECIT) 125 mg in sodium chloride 0.9 % 100 mL IVPB  125 mg IntraVENous Q MWF    [Held by provider] carvedilol  3.125 mg Oral BID WC    isosorbide mononitrate  30 mg Oral Daily    sodium chloride flush  5-40 mL IntraVENous 2 times per day    [Held by provider] lisinopril  10 mg Oral Daily    rosuvastatin  20 mg Oral Daily    rivaroxaban  15 mg Oral Daily with breakfast    insulin lispro  0-4 Units SubCUTAneous 4x Daily AC & HS    insulin glargine  15 Units SubCUTAneous BID     LORazepam, 0.5 mg, Q8H PRN  sodium chloride flush, 5-40 mL, PRN  sodium chloride, , PRN  ondansetron, 4 mg, Q8H PRN   Or  ondansetron, 4 mg, Q6H PRN  polyethylene glycol, 17 g, Daily PRN  acetaminophen, 650 mg, Q6H PRN   Or  acetaminophen, 650 mg, Q6H PRN  glucose, 4 tablet, PRN  dextrose bolus, 125 mL, PRN   Or  dextrose bolus, 250 mL, PRN  glucagon (rDNA), 1 mg, PRN  dextrose, , Continuous PRN         Objective:    BP (!) 113/59   Pulse 74   Temp 97.5 °F (36.4 °C) (Oral)   Resp 18   Ht 1.676 m (5' 6\")   Wt 112.9 kg (248 lb 12.8 oz)   SpO2 94%   BMI 40.16 kg/m²     General Appearance: alert and oriented to person, place and time 
      Jimmy Murphy M.D.  Jacky Somers D.O.  Mitchell Khan M.D.  Aixa Perez M.D.   Lisandro Collado D.O.  Arjun Trinidad M.D.           Daily Pulmonary Progress Note    Patient:  Thee Fountain 85 y.o. male MRN: [id number]     Date of Service: 7/20/2025        Subjective      Patient was seen and examined.    Reports breathing better.  Feels tired and fatigued.  Has been wearing CPAP at night.  Dobutamine dose has been increased.  Remains on Bumex drip.    Objective   Vitals: BP (!) 103/48   Pulse 64   Temp 97.5 °F (36.4 °C) (Oral)   Resp 20   Ht 1.676 m (5' 6\")   Wt 112.9 kg (248 lb 12.8 oz)   SpO2 97%   BMI 40.16 kg/m²     I/O:    Intake/Output Summary (Last 24 hours) at 7/20/2025 1306  Last data filed at 7/20/2025 1012  Gross per 24 hour   Intake 670 ml   Output 1400 ml   Net -730 ml       CURRENT MEDS :  Scheduled Meds:   ferric gluconate (FERRLECIT) 125 mg in sodium chloride 0.9 % 100 mL IVPB  125 mg IntraVENous Q MWF    [Held by provider] carvedilol  3.125 mg Oral BID WC    isosorbide mononitrate  30 mg Oral Daily    sodium chloride flush  5-40 mL IntraVENous 2 times per day    [Held by provider] lisinopril  10 mg Oral Daily    rosuvastatin  20 mg Oral Daily    rivaroxaban  15 mg Oral Daily with breakfast    insulin lispro  0-4 Units SubCUTAneous 4x Daily AC & HS    insulin glargine  15 Units SubCUTAneous BID       Continuous Infusions:   DOBUTamine 5 mcg/kg/min (07/20/25 0907)    sodium chloride      dextrose      bumetanide (BUMEX) 12.5 mg in sodium chloride 0.9 % 125 mL infusion 0.5 mg/hr (07/20/25 0307)       PRN Meds:  LORazepam, sodium chloride flush, sodium chloride, ondansetron **OR** ondansetron, polyethylene glycol, acetaminophen **OR** acetaminophen, glucose, dextrose bolus **OR** dextrose bolus, glucagon (rDNA), dextrose      Physical Exam:  Physical Exam  Constitutional:       General: He is not in acute distress.  HENT:      Head: Normocephalic and atraumatic.      Mouth/Throat:    
      Jimmy Murphy M.D.  Jacky Somers D.O.  Mitchell Khan M.D.  Aixa Perez M.D.   Lisandro Collado D.O.  Arjun Trinidad M.D.           Daily Pulmonary Progress Note    Patient:  Thee Fountain 85 y.o. male MRN: [id number]     Date of Service: 7/19/2025        Subjective      Patient was seen and examined.    Reports breathing better.  Dyspnea with exertion has improved.  Still having significant swelling.    Objective   Vitals: BP (!) 107/55   Pulse 60   Temp 97.5 °F (36.4 °C) (Oral)   Resp 18   Ht 1.676 m (5' 6\")   Wt 112.9 kg (248 lb 12.8 oz)   SpO2 98%   BMI 40.16 kg/m²     I/O:    Intake/Output Summary (Last 24 hours) at 7/19/2025 1430  Last data filed at 7/19/2025 1314  Gross per 24 hour   Intake 1130 ml   Output 1650 ml   Net -520 ml       CURRENT MEDS :  Scheduled Meds:   ferric gluconate (FERRLECIT) 125 mg in sodium chloride 0.9 % 100 mL IVPB  125 mg IntraVENous Q MWF    [Held by provider] carvedilol  3.125 mg Oral BID WC    isosorbide mononitrate  30 mg Oral Daily    sodium chloride flush  5-40 mL IntraVENous 2 times per day    [Held by provider] lisinopril  10 mg Oral Daily    rosuvastatin  20 mg Oral Daily    rivaroxaban  15 mg Oral Daily with breakfast    insulin lispro  0-4 Units SubCUTAneous 4x Daily AC & HS    insulin glargine  15 Units SubCUTAneous BID       Continuous Infusions:   DOBUTamine      sodium chloride      dextrose      bumetanide (BUMEX) 12.5 mg in sodium chloride 0.9 % 125 mL infusion 0.5 mg/hr (07/19/25 0406)       PRN Meds:  sodium chloride flush, sodium chloride, ondansetron **OR** ondansetron, polyethylene glycol, acetaminophen **OR** acetaminophen, glucose, dextrose bolus **OR** dextrose bolus, glucagon (rDNA), dextrose      Physical Exam:  Physical Exam  Constitutional:       General: He is not in acute distress.  HENT:      Head: Normocephalic and atraumatic.      Mouth/Throat:      Pharynx: No oropharyngeal exudate.   Eyes:      General: No scleral icterus.     
      [facility] Cardiology Inpatient Progress Note    Patient is a 85 y.o. male of Maulik Cowan MD seen in hospital follow up.     Chief complaint: CHF/Afib    HPI: Some SOB. No CP.     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    Chronic kidney disease, stage IV (severe) (HCC)    Paroxysmal atrial fibrillation (HCC)    ACP (advance care planning)    OMARI (obstructive sleep apnea)    Morbid obesity with body mass index (BMI) of 40.0 to 44.9 in adult (HCC)    Prostate cancer (HCC)    Acute exacerbation of chronic heart failure (HCC)    Congestive heart failure (HCC)    Anemia due to chronic kidney disease    Permanent atrial fibrillation (HCC)    Ischemic cardiomyopathy    Palliative care encounter    Goals of care, counseling/discussion       No Known Allergies    Current Facility-Administered Medications   Medication Dose Route Frequency Provider Last Rate Last Admin    0.9 % sodium chloride infusion   IntraVENous PRN Lana Dickson APRN - CNS        midodrine (PROAMATINE) tablet 5 mg  5 mg Oral TID  Facundo Molina MD   5 mg at 07/23/25 0822    rosuvastatin (CRESTOR) tablet 10 mg  10 mg Oral Daily Ju Jolly APRN - CNP   10 mg at 07/23/25 0822    sulfur hexafluoride microspheres (LUMASON) 60.7-25 MG injection 2 mL  2 mL IntraVENous ONCE PRN Raul Chaudhari DO        0.9 % sodium chloride infusion   IntraVENous PRN Aixa Perez MD        DOBUTamine (DOBUTREX) 1000 mg in dextrose 5 % 250 mL infusion  5 mcg/kg/min IntraVENous Continuous Watson 
      [facility] Cardiology Inpatient Progress Note    Patient is a 85 y.o. male of Maulik Cowan MD seen in hospital follow up.     Chief complaint: CHF/Afib    HPI: Some SOB. No CP.     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    Chronic kidney disease, stage IV (severe) (HCC)    Paroxysmal atrial fibrillation (HCC)    ACP (advance care planning)    OMARI (obstructive sleep apnea)    Morbid obesity with body mass index (BMI) of 40.0 to 44.9 in adult (HCC)    Prostate cancer (HCC)    Acute exacerbation of chronic heart failure (HCC)    Congestive heart failure (HCC)    Anemia due to chronic kidney disease    Permanent atrial fibrillation (HCC)    Ischemic cardiomyopathy       No Known Allergies    Current Facility-Administered Medications   Medication Dose Route Frequency Provider Last Rate Last Admin    DOBUTamine (DOBUTREX) 1000 mg in dextrose 5 % 250 mL infusion  2.5-10 mcg/kg/min IntraVENous Continuous Raul Chaudhari DO 4.2 mL/hr at 07/19/25 1441 2.5 mcg/kg/min at 07/19/25 1441    ferric gluconate (FERRLECIT) 125 mg in sodium chloride 0.9 % 100 mL IVPB  125 mg IntraVENous Q MWF Kala Pina MD   Stopped at 07/18/25 1932    [Held by provider] carvedilol (COREG) tablet 3.125 mg  3.125 mg Oral BID  Demetrius Myers MD   3.125 mg at 07/19/25 0836    isosorbide mononitrate (IMDUR) extended release tablet 30 mg  30 mg Oral Daily Demetrius Myers MD   30 mg at 07/19/25 0836    sodium chloride flush 0.9 % injection 5-40 mL  5-40 mL IntraVENous 2 times 
      [facility] Cardiology Inpatient Progress Note    Patient is a 85 y.o. male of Maulik Cowan MD seen in hospital follow up.     Chief complaint: CHF/Afib    HPI: Some SOB. No CP.     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    Chronic kidney disease, stage IV (severe) (HCC)    Paroxysmal atrial fibrillation (HCC)    ACP (advance care planning)    OMARI (obstructive sleep apnea)    Morbid obesity with body mass index (BMI) of 40.0 to 44.9 in adult (HCC)    Prostate cancer (HCC)    Acute exacerbation of chronic heart failure (HCC)    Congestive heart failure (HCC)    Anemia due to chronic kidney disease    Permanent atrial fibrillation (HCC)    Ischemic cardiomyopathy    Palliative care encounter    Goals of care, counseling/discussion       No Known Allergies    Current Facility-Administered Medications   Medication Dose Route Frequency Provider Last Rate Last Admin    midodrine (PROAMATINE) tablet 10 mg  10 mg Oral TID  Kala Pina MD   10 mg at 07/24/25 1029    sennosides-docusate sodium (SENOKOT-S) 8.6-50 MG tablet 2 tablet  2 tablet Oral BID Mirna Chatman MD   2 tablet at 07/24/25 0848    oxyCODONE (ROXICODONE) immediate release tablet 2.5 mg  2.5 mg Oral Q6H PRN Caroline Martinez, APRN - CNP        prochlorperazine (COMPAZINE) injection 10 mg  10 mg IntraVENous Q6H PRN Arabella Powell APRN - CNP   10 mg at 07/23/25 2241    glycopyrrolate (ROBINUL) IntraVENous 0.4 mg  0.4 mg IntraVENous Q4H PRN Yessy Welch, APRN - CNP        morphine 
      [facility] Cardiology Inpatient Progress Note    Patient is a 85 y.o. male of Maulik Cowan MD seen in hospital follow up.     Chief complaint: CHF/Afib    HPI: Some SOB. No CP.     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    Chronic kidney disease, stage IV (severe) (HCC)    Paroxysmal atrial fibrillation (HCC)    ACP (advance care planning)    OMARI (obstructive sleep apnea)    Morbid obesity with body mass index (BMI) of 40.0 to 44.9 in adult (HCC)    Prostate cancer (HCC)    Acute exacerbation of chronic heart failure (HCC)    Congestive heart failure (HCC)    Anemia due to chronic kidney disease    Permanent atrial fibrillation (HCC)    Ischemic cardiomyopathy       No Known Allergies    Current Facility-Administered Medications   Medication Dose Route Frequency Provider Last Rate Last Admin    midodrine (PROAMATINE) tablet 5 mg  5 mg Oral TID  Facundo Molina MD   5 mg at 07/21/25 0705    rosuvastatin (CRESTOR) tablet 10 mg  10 mg Oral Daily Ju Jolly APRN - CNP        DOBUTamine (DOBUTREX) 1000 mg in dextrose 5 % 250 mL infusion  5 mcg/kg/min IntraVENous Continuous Raul Chaudhari DO 8.5 mL/hr at 07/21/25 0644 5 mcg/kg/min at 07/21/25 0644    LORazepam (ATIVAN) tablet 0.5 mg  0.5 mg Oral Q8H PRN Lisandro Collado DO   0.5 mg at 07/20/25 9600    ferric gluconate (FERRLECIT) 125 mg in sodium chloride 0.9 % 100 mL IVPB  125 mg IntraVENous Q MWF Kala Pina MD   Stopped at 07/18/25 1932    [Held by provider] carvedilol (COREG) 
      [facility] Cardiology Inpatient Progress Note    Patient is a 85 y.o. male of Maulik Cowan MD seen in hospital follow up.     Chief complaint: CHF/Afib    HPI: Some SOB. No CP.     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    Chronic kidney disease, stage IV (severe) (HCC)    Paroxysmal atrial fibrillation (HCC)    ACP (advance care planning)    OMARI (obstructive sleep apnea)    Morbid obesity with body mass index (BMI) of 40.0 to 44.9 in adult (HCC)    Prostate cancer (HCC)    Acute exacerbation of chronic heart failure (HCC)    Congestive heart failure (HCC)    Anemia due to chronic kidney disease    Permanent atrial fibrillation (HCC)    Ischemic cardiomyopathy       No Known Allergies    Current Facility-Administered Medications   Medication Dose Route Frequency Provider Last Rate Last Admin    ferric gluconate (FERRLECIT) 125 mg in sodium chloride 0.9 % 100 mL IVPB  125 mg IntraVENous Q MWF Kala Pina MD   Stopped at 07/18/25 1932    carvedilol (COREG) tablet 3.125 mg  3.125 mg Oral BID  Demetrius Myers MD   3.125 mg at 07/19/25 0836    isosorbide mononitrate (IMDUR) extended release tablet 30 mg  30 mg Oral Daily Demetrius Myers MD   30 mg at 07/19/25 0836    sodium chloride flush 0.9 % injection 5-40 mL  5-40 mL IntraVENous 2 times per day Ju Jolly APRN - CNP   10 mL at 07/19/25 0838    sodium chloride flush 0.9 % injection 5-40 mL  5-40 mL IntraVENous PRN Ju Jolly APRN - CNP        0.9 % sodium chloride 
     LOS Nutrition Note     Reason for Visit:   Length of Stay    Nutrition Assessment:  Discharge plan in progress. Home with Hospice. Hospice trying to arrange equipment delivery, so pt can return home today. Comfort Care measures. Dietitian available per Consult     Current Nutrition Therapies:    ADULT DIET; Regular; 4 carb choices (60 gm/meal); Low Sodium (2 gm)    Anthropometrics:   Current Height: 167.6 cm (5' 6\")  Current Weight - Scale: 119.7 kg (263 lb 12.8 oz)      Monitoring and Evaluation:  No nutrition diagnosis.     Consult Dietitian if nutrition intervention essential to patient care is needed.     Discharge Planning:  No needs    Leeanne Ritter, RD, CNSC, LD     
    INPATIENT CARDIOLOGY FOLLOW-UP    Name: Thee Fountain    Age: 85 y.o.    Date of Admission: 7/17/2025 12:55 PM    Date of Service: 7/18/2025    Primary Cardiologist: laquita Layton    Chief Complaint: Follow-up for CHF, AF    Interim History:  Feeling little better.  Still getting short of breath with exertion.  Urinating a lot on the bumetanide infusion and states had several bowel movements.    The negative only about a liter.    Review of Systems:   Negative except as described above    Problem List:  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    Chronic kidney disease, stage IV (severe) (HCC)    Paroxysmal atrial fibrillation (HCC)    ACP (advance care planning)    OMARI (obstructive sleep apnea)    Morbid obesity with body mass index (BMI) of 40.0 to 44.9 in adult (HCC)    Prostate cancer (HCC)    Acute on chronic congestive heart failure (HCC)    Congestive heart failure (HCC)    Anemia due to chronic kidney disease    Permanent atrial fibrillation (HCC)    Ischemic cardiomyopathy       Current Medications:    Current Facility-Administered Medications:     sodium chloride flush 0.9 % injection 5-40 mL, 5-40 mL, IntraVENous, 2 times per day, Ju Jolly APRN - CNP, 10 mL at 07/18/25 1101    sodium chloride flush 0.9 % injection 5-40 mL, 5-40 mL, IntraVENous, PRN, Ju Jolly APRN - CNP    0.9 % sodium chloride infusion, , IntraVENous, PRN, Ju Jolly APRN - CNP    ondansetron (ZOFRAN-ODT) disintegrating tablet 4 
  Palliative Care Department  [phone]  Palliative Care Progress Note  Provider Caroline Martinez, CHOCO - JACKELINE    Thee Fountain  [id number]  Hospital Day: 7  Date of Initial Consult: 7/21/2025  Referring Provider: Lana WILHELM-CNS  Palliative Medicine was consulted for assistance with: Symptom management, goals of care    HPI:   Thee Fountain is a 85 y.o. with a medical history of CHF, CKD, HTN, HLD, diabetes, A-fib on Xarelto, prostate cancer who was admitted on 7/17/2025 from home with a CHIEF COMPLAINT of shortness of breath.  Patient reports increased in shortness of breath with dyspnea on exertion, increased bilateral lower extremity edema and weight gain of 12 pounds.  Patient was seen by PCP on 7/14 at which time Lasix was increased from 40 mg once daily to 40 mg twice daily.  Patient presented to ED with worsening symptoms as well as worsening renal function on outpatient labs.  CXR showing no acute process.  Patient was admitted for further medical management.    ASSESSMENT/PLAN:     Pertinent Hospital Diagnoses     Acute on chronic decompensated CHF cardiomyopathy with a EF 35-40%, dilated RV  Pulmonary hypertension  SAURABH on CKD  Hx prostate cancer    Symptom management    - Ativan 0.5 mg every 8 hours as needed  - Oxycodone 2.5 mg every 6 hours as needed    Palliative Care Encounter / Counseling Regarding Goals of Care  Please see detailed goals of care discussion as below  At this time, Thee Fountain, Does have capacity for medical decision-making.  Capacity is time limited and situation/question specific  During encounter there was no surrogate medical decision-maker needed  Outcome of goals of care meeting:   Patient has expressed that he does not believe he wants dialysis  Plan is to repeat blood work tomorrow  We had a discussion about hospice care  Code status DNR-CCA  Advanced Directives: no POA or living will in [facility]  Surrogate/Legal NOK:  Stella Joshi contact (spouse) [phone]  Dr. Mazariegos 
  Palliative Care Department  [phone]  Palliative Care Progress Note  Provider Caroline Martinez, CHOCO - JACKELINE    Thee Fountain  [id number]  Hospital Day: 8  Date of Initial Consult: 7/21/2025  Referring Provider: Lana WILHELM-CNS  Palliative Medicine was consulted for assistance with: Symptom management, goals of care    HPI:   Thee Fountain is a 85 y.o. with a medical history of CHF, CKD, HTN, HLD, diabetes, A-fib on Xarelto, prostate cancer who was admitted on 7/17/2025 from home with a CHIEF COMPLAINT of shortness of breath.  Patient reports increased in shortness of breath with dyspnea on exertion, increased bilateral lower extremity edema and weight gain of 12 pounds.  Patient was seen by PCP on 7/14 at which time Lasix was increased from 40 mg once daily to 40 mg twice daily.  Patient presented to ED with worsening symptoms as well as worsening renal function on outpatient labs.  CXR showing no acute process.  Patient was admitted for further medical management.    ASSESSMENT/PLAN:     Pertinent Hospital Diagnoses     Acute on chronic decompensated CHF cardiomyopathy with a EF 35-40%, dilated RV  Pulmonary hypertension  SAURABH on CKD  Hx prostate cancer    Symptom management    - Ativan 0.5 mg every 8 hours as needed  - Oxycodone 2.5 mg every 6 hours as needed    Palliative Care Encounter / Counseling Regarding Goals of Care  Please see detailed goals of care discussion as below  At this time, Thee Fountain, Does have capacity for medical decision-making.  Capacity is time limited and situation/question specific  During encounter there was no surrogate medical decision-maker needed  Outcome of goals of care meeting:   Patient has expressed that he does not believe he wants dialysis  Plan is to repeat blood work tomorrow  We had a discussion about hospice care  Code status DNR-CCA  Advanced Directives: no POA or living will in [facility]  Surrogate/Legal NOK:  Stella Joshi contact (spouse) [phone]  Dr. Mazariegos 
2 RN skin check completed with BRITTANY Coronado.  
4 Eyes Skin Assessment     NAME:  Thee Fountain  YOB: 1940  MEDICAL RECORD NUMBER:  [id number]    The patient is being assessed for  Admission    I agree that at least one RN has performed a thorough Head to Toe Skin Assessment on the patient. ALL assessment sites listed below have been assessed.      Areas assessed by both nurses:    Head, Face, Ears, Shoulders, Back, Chest, Arms, Elbows, Hands, Sacrum. Buttock, Coccyx, Ischium, Legs. Feet and Heels, and Under Medical Devices         Does the Patient have a Wound? No noted wound(s)       Fernie Prevention initiated by RN: No  Wound Care Orders initiated by RN: No    For hospital-acquired stage 1 & 2 and ALL Stage 3,4, Unstageable, DTI, NWPT, and Complex wounds: place order “IP Wound Care/Ostomy Nurse Eval and Treat” by RN under : No    New Ostomies, if present place, Ostomy referral order under : No     Nurse 1 eSignature: Electronically signed by Tonja Gardiner RN on 7/19/25 at 1:46 PM EDT    **SHARE this note so that the co-signing nurse can place an eSignature**    Nurse 2 eSignature: Electronically signed by Mabel Newsome RN on 7/19/25 at 3:24 PM EDT  
Called Arabella Powell NP about patients daughter requesting code status to be changed to DNRCC and adding some comfort medications. Awaiting a call back   
Chart reviewed, patient was sleeping, no family was at the bedside.  Bumex currently on hold with gentle hydration, improvement in kidney function.  Will continue to follow.  
Database initiated. Patient is A&O comes in from home with wife. States he uses a cane and a electric wheelchair as needed and is RA at baseline but wears a c-pap at night.     
Dr. Collado at the bedside when patient c/o lightheadedness after ambulating to the bathroom. Once laying down /59, HR 74, SpO2 94% on RA. Patient now stating he only feels week, no longer lightheaded. Dr. Collado still at the bedside and aware   
Dr. Myers notified of lightheadedness earlier after ambulating to bathroom, VS at that time, and most recent EKG results   
Message sent to Dr. Chatman regarding patient and patients family requesting a hospice consult. New orders received   
Message sent to Dr. Chaudhari regarding patient and family wanting to try a dopamine drip. Dobutamine gtt already d/c'd by Dr. Chaudhari, he states to keep dobutamine off unless renal would like to continue it. Per Dr. Pina, she would like dopamine gtt instead of dobutamine gtt. Nurse manager notified since dopamine is an ICU gtt only, she approves its use as long as it is not titrated from the initial dose. Dr. Pina updated, orders received.  
Message sent to Dr. Pina regarding patients morning labs.   
Message sent to Sherri Fermin NP regarding patient wanting to try a dopamine drip instead of dobutamine drip. Dr. Pina is okay with patient being switched over. Awaiting response back.   
Messaged Arabella Powell NP about critical BUN of 109  
Messaged Dr. Pina about critical BUN of 109  
Messaged Yessy Welch NP about patients family requesting code status change and comfort medications   
Messaged sent to cardiology and nephrology regarding dobutamine gtt and bumex gtt orders together..     Ok to run both gtts per cardiology and nephrology.   
Nephrology Progress Note  Patient's Name: Thee Fountain  3:22 PM  7/23/2025    Nephrologist: REGLA Pina MD    Reason for Consult:  SAURABH on CKD G4  Requesting Physician:  Dr. ANNI Saravia    Chief Complaint:  SOB    History Obtained From:  patient and past medical records    History of Present Ilness from the 7/17/25 note:    Thee Fountain is a 85 y.o. male with prior history of CKD G4 with a baseline serum cr 2.2-2.4mg/dl with an assoc e-GFR=29-27ml/min presumed sec to microvascular disease in the setting of DM2 and HTN. Pt states he has worsening SOB and LEONARD. He has no fever or chills. He notes he has ongoing wt gain. His usual wt is 236# and now wt up to 253#. He states he was on 80mg bid of furosemide po without improvement and this AM took 120mg but still has not had good UO. He notes he was treated for prostate cancer in the past with 42 radiation therapies and since then his bladder has \" shrank\" and he has some urinary hesitancy. He denied dysuria or hematuria    INTERVAL HX:    7/23/25: Pt sleeping aroused with ease. He states he does not feel well today and is more SOB. Last PM he had a time period of hypotension SBP 89-97 over apprx 12hrs despite being on IVF    Past Medical History:   Diagnosis Date    Acute myocardial infarction (HCC) 09/12/2023    Acute pancreatitis     Atrial fibrillation (HCC)     CAD (coronary artery disease)     CHF (congestive heart failure) (HCC)     Chronic back pain     Chronic kidney disease     Diabetes mellitus (HCC)     Erectile dysfunction     GERD (gastroesophageal reflux disease)     History of snoring     HTN (hypertension)     Hyperlipidemia     Obesity     Osteoarthritis     Preoperative clearance 08/29/2014    cardiac- Dr. Glover; note in [facility] for surgery 9/23/2014    Preoperative clearance 09/19/2014    medical clearance for OR 9-23-14 from Dr. MAURI Cowan on paper chart    Prostate cancer (HCC) 1999    radiation, seed implant    Renal insufficiency     Sleep apnea  
Notified Albert MERCADO, Dr Chatman discharging with hospice if okay with consults   
Occupational Therapy  OCCUPATIONAL THERAPY INITIAL EVALUATION  [facility]  [address], [place]    Date: 2025     Patient Name: Thee Fountain  MRN: [id number]  : 1940  Room: [address] [place] [address]    Evaluating OT: Waleska Beard, OTR/L - [id number]    Referring Provider: Ju Jolly APRN - CNP   Specific Provider Orders/Date: \"OT eval and treat\" - 2025    Diagnosis: Acute renal failure, unspecified acute renal failure type [N17.9]  Congestive heart failure, unspecified HF chronicity, unspecified heart failure type (HCC) [I50.9]  Acute exacerbation of chronic heart failure (HCC) [I50.9]      Pertinent Medical History: A fib, CAD, CHF, chronic back pain, CKD, DM, GERD, HTN, HLD, OA,  R TKA, spine surgery    Precautions: fall risk    Assessment of Current Deficits:    [x] Functional mobility   [x]ADLs  [x] Strength               []Cognition   [x] Functional transfers   [x] IADLs         [x] Safety Awareness   [x]Endurance   [] Fine Coordination              [x] Balance      [] Vision/perception   []Sensation    []Gross Motor Coordination  [] ROM  [] Delirium                   [] Motor Control     OT PLAN OF CARE   OT POC is based on physician orders, patient diagnosis, and results of clinical assessment.  Frequency/Duration 2-5 days/week for 2 weeks PRN   Specific OT Treatment Interventions to Include:   * Instruction/training on adapted ADL techniques and AE recommendations to increase functional independence within precautions       * Training on energy conservation strategies, correct breathing pattern and techniques to improve independence/tolerance for self-care routine  * Functional transfer/mobility training/DME recommendations for increased independence, safety, and fall prevention  * Patient/Family education to increase follow through with safety techniques and functional independence  * Recommendation of environmental 
Occupational Therapy  Pt laying in the bed with several family members present.  Pt just fell asleep and they would like him to rest.   Will attempt another time.   Mariola PLASCENCIA/SIVAN [phone]  
Occupational Therapy  Pt laying in the bed.  States he had a bad night and is very tired.  Pleasantly declined therapy this AM.  Will attempt another time.   Mariola PLASCENCIA/SIVAN [id number]  
Palliative consulted  
Patient was assisted to restroom by nursing assist and did not use BSC as planned. Re instructed need for strict out put measuring. Pt very apologetic. Collection containers placed in toilet.   
Patients BP 91/62, Notified kimberly Turner to hold evening coreg dose.   
Physical Therapy    Pt refused Rx this AM due to fatigue. Will follow on another date/time.  Abhi Sapp PTA [id number]    
Physical Therapy  Facility/Department: [address] MED SURG/TELE  Physical Therapy Initial Assessment    Name: Thee Fountain  : 1940  MRN: [id number]  Date of Service: 2025    Attending Provider:  Mirna Chatman MD    Evaluating PT:  Ventura Broussard Jr., P.T.    Room #:  0532/0532-A  Diagnosis:  Acute renal failure, unspecified acute renal failure type [N17.9]  Congestive heart failure, unspecified HF chronicity, unspecified heart failure type (HCC) [I50.9]  Acute exacerbation of chronic heart failure (HCC) [I50.9]  PMH: B shoulder RTC tears per pt, needed B shoulder surgery years ago and never had it done.   Precautions:  falls    SUBJECTIVE:    Pt lives with his wife in a 1 floor condo with 1 small step to enter. Pt ambulated with a SPC PRN PTA.  He has a cane, ww, WC, and electric scooter    OBJECTIVE:   Initial Evaluation  Date: 25 Treatment Short Term/ Long Term   Goals   Was pt agreeable to Eval/treatment? yes     Does pt have pain? C/o cramping pain BLE, B shoulders and chest.     Bed Mobility  Rolling: Independent  Supine to sit: Independent  Sit to supine: Independent  Scooting: Independent  Independent   Transfers Sit to stand: Independent  Stand to sit: Independent  Stand pivot: supervision with ww  Independent    Ambulation   50 feet with ww supervision  100 feet with AAD Independent    Stair negotiation: ascended and descended NA  1 step with 1 rail Independent   AM-PAC 6 Clicks        BLE ROM is WFL.   BLE strength is grossly 4/5 to 4+/5.  Balance: sitting is Independent and standing with ww is Independent  Endurance: fair    ASSESSMENT:    Conditions Requiring Skilled Therapeutic Intervention:    [x]Decreased strength     []Decreased ROM  [x]Decreased functional mobility  [x]Decreased balance   [x]Decreased endurance   []Decreased posture  []Decreased sensation  []Decreased coordination   []Decreased vision  []Decreased safety awareness   [x]Increased pain       Comments:  Pt was 
Physical Therapy  Facility/Department: [address] INTERMEDIATE 1    NAME: Thee Fountain  : 1940  MRN: [id number]    Chart reviewed and PT treatment attempted this am.  Family wanted pt to rest at this time.  Will check back at later time/date.     Evita Hair, PT [id number]  
Renal Dose Adjustment Policy (Generic)     This patient is on medication that requires renal, weight, and/or indication dose adjustment.      Date Body Weight IBW  Adjusted BW SCr  CrCl Dialysis status   7/21/2025 112.9 kg (248 lb 12.8 oz) Ideal body weight: 63.8 kg (140 lb 10.5 oz)  Adjusted ideal body weight: 83.4 kg (183 lb 14.6 oz) Serum creatinine: 4 mg/dL (H) 07/21/25 0454  Estimated creatinine clearance: 16 mL/min (A) N/a       Pharmacy has dose-adjusted the following medication(s):    Date Previous Order Adjusted Order   7/21/2025 Rosuvastatin 20 mg daily Rosuvastatin 10 mg daily       These changes were made per protocol according to the [facility]   Automatic Renal Dose Adjustment Policy.     *Please note this dose may need readjusted if patient's condition changes.    Please contact pharmacy with any questions regarding these changes.    devante peguero RPH  7/21/2025  7:16 AM    
Spiritual Health History and Assessment/Progress Note  [facility]     Encounter, Rituals, Rites and Sacraments,  ,  ,      Name: Thee Fountain MRN: [id number]    Age: 85 y.o.     Sex: male   Language: English   Evangelical: Anabaptist   Congestive heart failure (HCC)     Date: 7/21/2025                           Spiritual Assessment continued in [facility] 4S INTERMEDIATE 1        Referral/Consult From: Other , Nursing Supervisor/Manager   Encounter Overview/Reason:  Encounter, Rituals, Rites and Sacraments  Service Provided For: Patient    Macy, Belief, Meaning:   Patient is connected with a macy tradition or spiritual practice  Family/Friends are connected with a macy tradition or spiritual practice      Importance and Influence:  Patient has no beliefs influential to healthcare decision-making identified during this visit  Family/Friends have no beliefs influential to healthcare decision-making identified during this visit    Community:  Patient feels well-supported. Support system includes: Spouse/Partner and Children  Family/Friends feel well-supported. Support system includes: Parent/s and Children    Assessment and Plan of Care:     Patient Interventions include: Facilitated expression of thoughts and feelings, Affirmed coping skills/support systems, and Provided sacramental/Rastafari ritual  Family/Friends Interventions include: Facilitated expression of thoughts and feelings and Affirmed coping skills/support systems    Patient Plan of Care: Spiritual Care available upon further referral  Family/Friends Plan of Care: Spiritual Care available upon further referral    Electronically signed by Chaplain Josselyn on 7/21/2025 at 4:22 PM   
Spiritual Health History and Assessment/Progress Note  [facility]    Palliative Care,  ,  ,      Name: Thee Fountain MRN: [id number]    Age: 85 y.o.     Sex: male   Language: English   Amish: Religion   Congestive heart failure (HCC)     Date: 7/22/2025                           Spiritual Assessment began in [facility] 4S INTERMEDIATE 1        Referral/Consult From: Palliative Care   Encounter Overview/Reason: Palliative Care  Service Provided For: Patient and family together, Significant other    Macy, Belief, Meaning:   Patient identifies as spiritual, is connected with a macy tradition or spiritual practice, and has beliefs or practices that help with coping during difficult times  Family/Friends identify as spiritual, are connected with a macy tradition or spiritual practice, and have beliefs or practices that help with coping during difficult times      Importance and Influence:  Patient has spiritual/personal beliefs that influence decisions regarding their health  Family/Friends have spiritual/personal beliefs that influence decisions regarding the patient's health    Community:  Patient is connected with a spiritual community and feels well-supported. Support system includes: Spouse/Partner, Children, and Macy Community  Family/Friends are connected with a spiritual community: and feel well-supported. Support system includes: Spouse/Partner, Children, and [place] Community    Assessment and Plan of Care:     Patient Interventions include: Facilitated expression of thoughts and feelings, Explored spiritual coping/struggle/distress, Affirmed coping skills/support systems, and Provided sacramental/Faith ritual  Family/Friends Interventions include: Provided sacramental/Faith ritual    Patient Plan of Care: Spiritual Care available upon further referral  Family/Friends Plan of Care: Spiritual Care available upon further referral    Electronically signed by TONYA Gonzales on 7/22/2025 
Spiritual Health History and Assessment/Progress Note  [facility]     Encounter, Rituals, Rites and Sacraments,  ,  ,      Name: Thee Fountain MRN: [id number]    Age: 85 y.o.     Sex: male   Language: English   Hindu: Latter-day   Congestive heart failure (HCC)     Date: 7/18/2025                           Spiritual Assessment began in [facility] MED SURG/TELE        Referral/Consult From: Other , Nurse   Encounter Overview/Reason:  Encounter, Rituals, Rites and Sacraments  Service Provided For: Patient    Macy, Belief, Meaning:   Patient is connected with a macy tradition or spiritual practice  Family/Friends are connected with a macy tradition or spiritual practice      Importance and Influence:  Patient has no beliefs influential to healthcare decision-making identified during this visit  Family/Friends have no beliefs influential to healthcare decision-making identified during this visit    Community:  Patient feels well-supported. Support system includes: Spouse/Partner and Children  Family/Friends feel well-supported. Support system includes: Parent/s and Children    Assessment and Plan of Care:     Patient Interventions include: Facilitated expression of thoughts and feelings, Affirmed coping skills/support systems, and Provided sacramental/Cheondoism ritual  Family/Friends Interventions include: Facilitated expression of thoughts and feelings and Affirmed coping skills/support systems    Patient Plan of Care: Spiritual Care available upon further referral  Family/Friends Plan of Care: Spiritual Care available upon further referral    Electronically signed by Chaplain Josselyn on 7/18/2025 at 4:18 PM   
Spiritual Health History and Assessment/Progress Note  [facility]    Rituals, Rites and Sacraments (Hinduism communion),  ,  ,      Name: Thee Fountain MRN: [id number]    Age: 85 y.o.     Sex: male   Language: English   Jewish: Faith   Congestive heart failure (HCC)     Date: 7/22/2025                           Spiritual Assessment began in [facility] INTERMEDIATE 1        Referral/Consult From: Patient, Other    Encounter Overview/Reason: Rituals, Rites and Sacraments (Hinduism communion)  Service Provided For: Patient and family together    Macy, Belief, Meaning:   Patient identifies as spiritual, is connected with a macy tradition or spiritual practice, and has beliefs or practices that help with coping during difficult times  Family/Friends identify as spiritual, are connected with a macy tradition or spiritual practice, and have beliefs or practices that help with coping during difficult times      Importance and Influence:  Patient has spiritual/personal beliefs that influence decisions regarding their health  Family/Friends have spiritual/personal beliefs that influence decisions regarding the patient's health    Community:  Patient is connected with a spiritual community and feels well-supported. Support system includes: Spouse/Partner, Children, and [facility] Community  Family/Friends are connected with a spiritual community: and feel well-supported. Support system includes: Spouse/Partner, Children, and [place] Community    Assessment and Plan of Care:     Patient Interventions include: Facilitated expression of thoughts and feelings, Explored spiritual coping/struggle/distress, Affirmed coping skills/support systems, and Provided sacramental/Moravian ritual  Family/Friends Interventions include: Facilitated expression of thoughts and feelings, Explored spiritual coping/struggle/distress, Affirmed coping skills/support systems, and Provided sacramental/Moravian ritual    Patient Plan 
Spoke with the pt family and the decision has been made for Hospice. WIll D/C dopamine and titrate the midodrine for present.  Little else to add. WIll sign off  Thank you for allowing me to participate in Thee's care.    REGLA Pina MD  
Text message sent to  informing him current BP 82/42, Bumex gtt held and Dobutamine remains at 5mcg/kg/min. Will continue to monitor.  
Text message sent to Dr. Molina informing him of current BP of 78/44  
85/44   Pulse 74   Temp 97.3 °F (36.3 °C) (Axillary)   Resp 20   Ht 1.676 m (5' 6\")   Wt 114.8 kg (253 lb)   SpO2 96%   BMI 40.84 kg/m²   SpO2 Readings from Last 1 Encounters:   07/21/25 96%        I/O:    Intake/Output Summary (Last 24 hours) at 7/21/2025 1354  Last data filed at 7/21/2025 0923  Gross per 24 hour   Intake 460 ml   Output 550 ml   Net -90 ml       CURRENT MEDS :  Scheduled Meds:   midodrine  5 mg Oral TID WC    rosuvastatin  10 mg Oral Daily    ferric gluconate (FERRLECIT) 125 mg in sodium chloride 0.9 % 100 mL IVPB  125 mg IntraVENous Q MWF    [Held by provider] carvedilol  3.125 mg Oral BID WC    isosorbide mononitrate  30 mg Oral Daily    sodium chloride flush  5-40 mL IntraVENous 2 times per day    [Held by provider] lisinopril  10 mg Oral Daily    rivaroxaban  15 mg Oral Daily with breakfast    insulin lispro  0-4 Units SubCUTAneous 4x Daily AC & HS    insulin glargine  15 Units SubCUTAneous BID       Physical Exam:  General Appearance: appears comfortable in no acute distress.   HEENT: Normocephalic atraumatic without obvious abnormality   Neck: Lips, mucosa, and tongue normal.  Supple, symmetrical, trachea midline, no adenopathy;thyroid:  no enlargement/tenderness/nodules or JVD.  Lung: Breath sounds CTA. Respirations   unlabored. Symmetrical expansion.  Heart: RRR, normal S1, S2. No MRG  Abdomen: Soft, NT, ND. BS present x 4 quadrants. No bruit or organomegaly.   Extremities: 2-3+ nonpitting edema BLE, VEL hose on  Musculokeletal: No joint swelling, no muscle tenderness. ROM normal in all joints of extremities.   Neurologic: Mental status: Alert and Oriented X3 .    Pertinent/ New Labs and Imaging Studies     Imaging personally reviewed:  CXR 7/17/25:  FINDINGS:  The lungs are without acute focal process.  There is no effusion or  pneumothorax.  Stable heart size.  The osseous structures are without acute  process.     IMPRESSION:  No acute process.       Echo 7/18/25:  Left 
(HCC)    OMARI (obstructive sleep apnea)    Morbid obesity with body mass index (BMI) of 40.0 to 44.9 in adult (HCC)    Prostate cancer (HCC)    Anemia due to chronic kidney disease    Permanent atrial fibrillation (HCC)    Ischemic cardiomyopathy  Resolved Problems:    * No resolved hospital problems. *      Plan:  SOB 2/2 Acute exacerbation of CHF- BNP 8786. Monitor Is&Os and daily weights. Monitor BNP every other day. Consult to cardiology and HF nurse coordinator, appreciate their input. Appreciate nephrology recommendations for diuresis, on IV Bumex gtt. Echo ordered.   SAURABH on CKD stage IV- creat 3.3 (baseline 2.2-2.4). Follows with Dr. Schultz. Consult to nephrology, appreciate their input. Hold nephrotoxic agents. Monitor on BMP.   Elevated troponin- 50. No complaints of chest pain at this time. Likely r/t demand ischemia. Repeat in process, follow. Cardiology on board.   ?Junctional rhythm- Cardiology on board.    Anemia of chronic disease- hgb stable at 9.1. monitor on CBC. Transfuse <7.0.   Essential HTN- stable in ED. Hold home lisinopril. Continue amlodipine.   HLD- continue statin  DM type 2- A1C 5.5 7/2025. CC diet, ACHS BG checks, lantus, SSI, hypoglycemia protocol.  PAF- on xarelto, continue. Monitor on telemetry  Hx Prostate ca  OMARI- continue CPAP at HS, will need NIV on dc.  Morbid obesity- BMI 40.35. diet and exercise counseling.     Code Status: Full  DVT prophylaxis: Xarelto  Remains on bumex gtt, needs NIV on dc  Pulmonology, cardiology and nephrology following      NOTE: This report was transcribed using voice recognition software. Every effort was made to ensure accuracy; however, inadvertent computerized transcription errors may be present.  Electronically signed by Mirna Chatman MD on 7/18/2025 at 8:19 AM    
07/18/2025 02:47 AM    MCH 24.9 07/18/2025 02:47 AM    MCHC 31.2 07/18/2025 02:47 AM    RDW 14.6 07/18/2025 02:47 AM     07/18/2025 02:47 AM    MPV 11.8 07/18/2025 02:47 AM     Lab Results   Component Value Date/Time     07/18/2025 02:47 AM    K 4.3 07/18/2025 02:47 AM    K 4.6 10/12/2021 08:55 AM     07/18/2025 02:47 AM    CO2 21 07/18/2025 02:47 AM    BUN 82 07/18/2025 02:47 AM    CREATININE 3.2 07/18/2025 02:47 AM    CALCIUM 9.6 07/18/2025 02:47 AM    GFRAA >60 10/25/2021 06:25 AM    LABGLOM 18 07/18/2025 02:47 AM    LABGLOM 29 03/01/2024 11:24 AM     Lab Results   Component Value Date/Time    PROTIME 24.1 09/26/2014 03:45 AM    INR 2.2 09/26/2014 03:45 AM     Recent Labs     07/17/25  1315   PROBNP 8,786*        Assessment:    Acute exacerbation of CHF  OMARI on Pap therapy  SAURABH on CKD  Atrial fibrillation, on xarelto  CAD  Diabetes mellitus     Plan:   Continue to monitor on room air. Apply supplemental oxygen as needed to maintain SpO2 greater than 90%  Home CPAP at bedside  Echocardiogram pending  Nephrology following for SAURABH on CKD, defer diuretic management to their service. Creatinine from baseline, down to 3.2, baseline 2.1. Bumex infusion, monitor I&Os  Cardiology following for CHF. Patient sees [facility] following while inpatient  Daily weights. Low sodium diet  PT/OT  CM to call [employer] to see if they can bring his new BiPap device to his room. He still currently has an autoCpap that needs upgraded based on titration study. Discussed with CM.      This plan of care was reviewed in collaboration with Dr. Collado    Electronically signed by CHOCO Velasco CNP on 7/18/2025 at 12:11 PM        I personally saw, examined, and cared for the patient. I performed the substantive portion of the visit. Labs, medications, radiographs reviewed. I agree with history exam and plans detailed in NP note.        Lisandro Collado, DO          
AM    MYELOPCT 0.9 10/19/2021 03:59 PM    EOSPCT 7 07/18/2025 02:47 AM    BASOPCT 1 07/18/2025 02:47 AM    MONOSABS 0.65 07/18/2025 02:47 AM    LYMPHSABS 1.03 07/18/2025 02:47 AM    EOSABS 0.41 07/18/2025 02:47 AM    BASOSABS 0.05 07/18/2025 02:47 AM     Hemoglobin/Hematocrit:    Lab Results   Component Value Date/Time    HGB 8.3 07/18/2025 02:47 AM    HCT 26.6 07/18/2025 02:47 AM     CMP:    Lab Results   Component Value Date/Time     07/18/2025 02:47 AM    K 4.3 07/18/2025 02:47 AM    K 4.6 10/12/2021 08:55 AM     07/18/2025 02:47 AM    CO2 21 07/18/2025 02:47 AM    BUN 82 07/18/2025 02:47 AM    CREATININE 3.2 07/18/2025 02:47 AM    GFRAA >60 10/25/2021 06:25 AM    LABGLOM 18 07/18/2025 02:47 AM    LABGLOM 29 03/01/2024 11:24 AM    GLUCOSE 84 07/18/2025 02:47 AM    CALCIUM 9.6 07/18/2025 02:47 AM    BILITOT 0.4 07/18/2025 02:47 AM    ALKPHOS 73 07/18/2025 02:47 AM    AST 30 07/18/2025 02:47 AM    ALT 10 07/18/2025 02:47 AM     BMP:    Lab Results   Component Value Date/Time     07/18/2025 02:47 AM    K 4.3 07/18/2025 02:47 AM    K 4.6 10/12/2021 08:55 AM     07/18/2025 02:47 AM    CO2 21 07/18/2025 02:47 AM    BUN 82 07/18/2025 02:47 AM    CREATININE 3.2 07/18/2025 02:47 AM    CALCIUM 9.6 07/18/2025 02:47 AM    GFRAA >60 10/25/2021 06:25 AM    LABGLOM 18 07/18/2025 02:47 AM    LABGLOM 29 03/01/2024 11:24 AM    GLUCOSE 84 07/18/2025 02:47 AM     BUN/Creatinine:    Lab Results   Component Value Date/Time    BUN 82 07/18/2025 02:47 AM    CREATININE 3.2 07/18/2025 02:47 AM     Hepatic Function Panel:    Lab Results   Component Value Date/Time    ALKPHOS 73 07/18/2025 02:47 AM    ALT 10 07/18/2025 02:47 AM    AST 30 07/18/2025 02:47 AM    BILITOT 0.4 07/18/2025 02:47 AM    BILIDIR 0.2 07/18/2025 02:47 AM    IBILI 0.2 07/18/2025 02:47 AM     Albumin:  No results found for: \"LABALBU\"  Calcium:    Lab Results   Component Value Date/Time    CALCIUM 9.6 07/18/2025 02:47 AM     Ionized Calcium:  No 
Results   Component Value Date/Time    BUN 89 07/21/2025 04:54 AM    CREATININE 4.0 07/21/2025 04:54 AM     Hepatic Function Panel:    Lab Results   Component Value Date/Time    ALKPHOS 73 07/18/2025 02:47 AM    ALT 10 07/18/2025 02:47 AM    AST 30 07/18/2025 02:47 AM    BILITOT 0.4 07/18/2025 02:47 AM    BILIDIR 0.2 07/18/2025 02:47 AM    IBILI 0.2 07/18/2025 02:47 AM     Albumin:  No results found for: \"LABALBU\"  Calcium:    Lab Results   Component Value Date/Time    CALCIUM 9.4 07/21/2025 04:54 AM     Ionized Calcium:  No components found for: \"IONCA\"  Magnesium:    Lab Results   Component Value Date/Time    MG 2.9 07/21/2025 04:54 AM     Phosphorus:    Lab Results   Component Value Date/Time    PHOS 5.7 07/21/2025 04:54 AM     LDH:  No results found for: \"LDH\"  Uric Acid:    Lab Results   Component Value Date/Time    URICACID 6.0 09/25/2014 05:42 AM     PT/INR:    Lab Results   Component Value Date/Time    PROTIME 24.1 09/26/2014 03:45 AM    INR 2.2 09/26/2014 03:45 AM     PTT:    Lab Results   Component Value Date/Time    APTT 32.1 10/19/2021 07:12 AM   [APTT}  Troponin:  No results found for: \"TROPONINI\"  U/A:    Lab Results   Component Value Date/Time    NITRITE neg 01/07/2025 01:41 PM    COLORU Yellow 07/17/2025 01:15 PM    PROTEINU NEGATIVE 07/17/2025 01:15 PM    PHUR 6.0 07/17/2025 01:15 PM    PHUR 6.0 01/07/2025 01:41 PM    PHUR 5.5 10/12/2021 01:40 AM    WBCUA 0 TO 5 07/17/2025 01:15 PM    RBCUA 0 TO 2 07/17/2025 01:15 PM    BACTERIA FEW 10/12/2021 01:40 AM    CLARITYU turbid 01/07/2025 01:41 PM    CLARITYU SL CLOUDY 10/12/2021 01:40 AM    SPECGRAV 1.015 01/07/2025 01:41 PM    LEUKOCYTESUR NEGATIVE 07/17/2025 01:15 PM    UROBILINOGEN 0.2 07/17/2025 01:15 PM    BILIRUBINUR NEGATIVE 07/17/2025 01:15 PM    BLOODU 3+  200 prisca 01/07/2025 01:41 PM    BLOODU MODERATE 10/12/2021 01:40 AM    GLUCOSEU 100 07/17/2025 01:15 PM     ABG:    Lab Results   Component Value Date/Time    PH 7.410 10/13/2021 11:39 AM    
the left coronary catheter in the os of the left main  coronary artery in addition to the patient's body habitus.  3.  Widely patent large lumen left subclavian and left internal mammary arteries.    Echo Summary 7/18/2025:    Left Ventricle: Moderately reduced left ventricular systolic function with a visually estimated EF of 35 - 40%. Left ventricle size is normal. Moderately increased wall thickness. Moderate global hypokinesis present. Abnormal diastolic function.    Right Ventricle: Right ventricle is moderately dilated. Low normal systolic function. TAPSE is 1.6 cm.    Aortic Valve: Mild stenosis of the aortic valve. AV mean gradient is 8 mmHg. AV area by continuity VTI is 1.7 cm2. AV Peak Velocity is 1.9 m/s. LVOT:AV VTI Index is 0.51. LVOT Stroke Volume Index is 29.5 mL/m2.    Tricuspid Valve: Mild regurgitation. RVSP is at least 47 mmHg.    Left Atrium: Left atrium is moderately dilated.    Right Atrium: Right atrium is severely dilated.    Aorta: Normal sized aortic root. Mildly dilated ascending aorta. Ao ascending diameter is 4.0 cm.    Image quality is technically difficult. Contrast used: Lumason. Technically difficult study with poor endocardial visualization.     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 
07/24/2025 05:50 AM    RBC 3.75 07/24/2025 05:50 AM    HGB 9.6 07/24/2025 05:50 AM    HCT 30.2 07/24/2025 05:50 AM    MCV 80.5 07/24/2025 05:50 AM    MCH 25.6 07/24/2025 05:50 AM    MCHC 31.8 07/24/2025 05:50 AM    RDW 15.7 07/24/2025 05:50 AM     07/24/2025 05:50 AM    MPV 12.3 07/24/2025 05:50 AM     Lab Results   Component Value Date/Time     07/24/2025 05:50 AM    K 5.5 07/24/2025 05:50 AM    K 4.6 10/12/2021 08:55 AM    CL 98 07/24/2025 05:50 AM    CO2 16 07/24/2025 05:50 AM     07/24/2025 05:50 AM    CREATININE 5.9 07/24/2025 05:50 AM    CALCIUM 9.2 07/24/2025 05:50 AM    GFRAA >60 10/25/2021 06:25 AM    LABGLOM 9 07/24/2025 05:50 AM    LABGLOM 29 03/01/2024 11:24 AM     Lab Results   Component Value Date/Time    PROTIME 24.1 09/26/2014 03:45 AM    INR 2.2 09/26/2014 03:45 AM     Recent Labs     07/23/25  0248   PROBNP 16,962*        Assessment:    Acute on chronic decompensated CHF cardiomyopathy with EF 35-40%, dilated RV  Moderate pulmonary hypertension, RVSP 47  OMARI on Pap therapy  SAURABH on CKD  Atrial fibrillation, on xarelto  CAD  Diabetes mellitus   Morbid obesity    Plan:   Family adjusted CODE STATUS to DNR CC yesterday   Hopeful to be discharged home with hospice by this evening  On 2L NC, may use for comfort  Continue Home CPAP at bedside, Discontinue BiPAP 17/13   CXR 7/23 reviewed-overall congestion with small right pleural effusion  Received 1 unit PRBCs on 7/21 and 7/22  Echocardiogram 7/18/25 as above, repeat 7/21 essentially unchanged  Nephrology following for SAURABH on CKD, defer diuretic management to their service. Creatinine increased at 5.2 this morning, baseline 2.1. Bumex infusion remains on hold, monitor I&Os  Cardiology following for CHF. Patient sees [facility] following while inpatient, on dopamine drip  Pulmonary services will sign off      This plan of care was reviewed in collaboration with Dr. Trinidad    Electronically signed by Lana Dickson, 
10/13/2021 11:39 AM    PCO2 29.6 10/13/2021 11:39 AM    PO2 75.6 10/13/2021 11:39 AM    HCO3 18.3 10/13/2021 11:39 AM    BE -5.0 10/13/2021 11:39 AM    O2SAT 95.1 10/13/2021 11:39 AM     HgBA1c:    Lab Results   Component Value Date/Time    LABA1C 5.5 07/15/2025 12:17 PM     Microalbumen/Creatinine ratio:  No components found for: \"RUCREAT\"  FLP:    Lab Results   Component Value Date/Time    TRIG 96 07/18/2025 02:47 AM    HDL 32 07/18/2025 02:47 AM     TSH:    Lab Results   Component Value Date/Time    TSH 3.39 07/17/2025 03:04 PM     VITAMIN B12: No components found for: \"B12\"  FOLATE:    Lab Results   Component Value Date/Time    FOLATE 7.2 07/18/2025 02:47 AM     Iron Saturation:  No components found for: \"PERCENTFE\"  FERRITIN:    Lab Results   Component Value Date/Time    FERRITIN 23 07/17/2025 03:04 PM     LIPASE:  No results found for: \"LIPASE\"  Fibrinogen Level:  No components found for: \"FIB\"  24 Hour Urine for Protein:  No components found for: \"RAWUPRO\", \"UHRS3\", \"ZRPM50XU\", \"UTV3\"  24 Hour Urine for Creatinine Clearance:  No components found for: \"CREAT4\", \"UHRS10\", \"UTV10\"  PSA:   Lab Results   Component Value Date/Time    PSA <0.01 01/07/2025 02:37 PM        Imaging:  CXR results:XR CHEST PORTABLE [[id number]] Collected: 07/17/25 1329     Order Status: Completed Updated: 07/17/25 1332    Narrative:      EXAMINATION:  ONE XRAY VIEW OF THE CHEST    7/17/2025 1:17 pm    COMPARISON:  10/20/2021    HISTORY:  ORDERING SYSTEM PROVIDED HISTORY: SOB  TECHNOLOGIST PROVIDED HISTORY:  Reason for exam:->SOB    FINDINGS:  The lungs are without acute focal process.  There is no effusion or  pneumothorax.  Stable heart size.  The osseous structures are without acute  process.    Impression:      No acute process.    2D ECHO 10/12/2021:   Summary   Moderate asymmetric septal hypertrophy septal wall 1.6cm.   Mild global LV hypokineses   Ejection fraction is visually estimated at 40 to 45%.   Normal right ventricular size 
hypertrophy septal wall 1.6cm.   Mild global LV hypokineses   Ejection fraction is visually estimated at 40 to 45%.   Normal right ventricular size and function.   Mildly enlarged right atrium size.   Mild mitral regurgitation is present.   The aortic valve appears mildly sclerotic.   No hemodynamically significant aortic stenosis is present.   Mild tricuspid regurgitation.    US RETROPERITONEAL COMPLETE [[id number]]   Collected: 07/18/25 1046   Updated: 07/18/25 1051   Order Status: Completed   Narrative:    EXAMINATION:  RETROPERITONEAL ULTRASOUND OF THE KIDNEYS AND URINARY BLADDER    7/18/2025    COMPARISON:  10/12/2021    HISTORY:  ORDERING SYSTEM PROVIDED HISTORY: SAURABH on CKD  TECHNOLOGIST PROVIDED HISTORY:  Reason for exam:->SAURABH on CKD  What reading provider will be dictating this exam?->CRC    FINDINGS:    Kidneys:    The right kidney measures 9.0 cm in length and the left kidney measures 11.2  cm in length.  Simple cystic structures identified on the kidneys  bilaterally.  Largest on the right measures 2.3 cm in largest on the left  measures 2.3 cm.  No hydronephrosis or nephrolithiasis.  Increased  echogenicity within the renal cortex bilaterally to suggest bilateral medical  renal disease.    Incidental fluid identified in the right and left upper quadrants.      Bladder:    The bladder is empty and not visualized.  Impression:    1. No hydronephrosis or nephrolithiasis. Simple cysts identified on the  kidneys bilaterally.  2. Increased echogenicity within the renal cortex bilaterally to suggest  bilateral medical renal disease.  3. Incidental fluid identified in the right and left upper quadrants.         Assessment    1. Stage I SAURABH on Chronic kidney disease stage 4  Baseline creatinine averages 2.2-2.4mg /dl with assoc e-GFR=29-27ml/min presumed sec to microvasc disease in the setting of the HTN and DM2  The SAURABH most probably due to decompensated Cardio-Pulm Status and the increase in the loop diuretic 
to suggest bilateral medical  renal disease.    Incidental fluid identified in the right and left upper quadrants.      Bladder:    The bladder is empty and not visualized.  Impression:    1. No hydronephrosis or nephrolithiasis. Simple cysts identified on the  kidneys bilaterally.  2. Increased echogenicity within the renal cortex bilaterally to suggest  bilateral medical renal disease.  3. Incidental fluid identified in the right and left upper quadrants.         Assessment    1. Stage I SAURABH on Chronic kidney disease stage 4  Baseline creatinine averages 2.2-2.4mg /dl with assoc e-GFR=29-27ml/min presumed sec to microvasc disease in the setting of the HTN and DM2  The SAURABH most probably due to decompensated Cardio-Pulm Status and the increase in the loop diuretic as indicated by the FeNa>1% but the FeUrea 23%  UA gluc 100, protein (-) and HgB (-), WBC 0-5, RBC 0-2, Ni and LE (-)  UACR 33  Renal US no hydro  He has completed assessment by the Renal care coordinator  Creatinine continues to rise slowly, today BUN is up to 85 and creatinine up to 3.5 from yesterday is 3.2 mg/dL  PLAN:  Continue Bumex drip, agree with increased dose of dobutamine  No immediate need for kidney replacement therapy though if he does begin to experience uremic symptoms we may need to proceed  Continue to monitor labs  Follow I/O's  Holding  the ACEI and the SGLT2i        2. HTN with CKD I-IV  BP  goal <130/80(ACC/AHA Target)-at goal  HFmr EF with Vol Overload  PLAN:  Hold the ACEI and Amlodipine  Follow BP on the loop diuretic drip-will continue the dose of the bumetanide drip to 0.5mg/hr  Dietary sodium restriction   Follow BNP, slightly higher today     3. T2DM with CKD  PLAN:  Defer to Hospital Service     4.Anemia in CKD  HgB below goal 10-12g/dl today 8.0 g/dL  SPEP and UPEP (-) for monoclonal protein  Ferritin 23 and Iron sat 6%  Folate 7.2 and B12 389  PLAN:  Continue IV Fe++  and once complete will then start KEVON  Follow CBC     5.

## 2025-07-28 ENCOUNTER — TELEPHONE (OUTPATIENT)
Age: 85
End: 2025-07-28

## 2025-07-28 NOTE — TELEPHONE ENCOUNTER
PT CALLED STATING HE'S SUPPOSE SCHEDULE APPT. WITH DR. GUADALUPE BUT PT WAS TOLD BE HIS OFFICE THAT HIS IS NOT ACCEPTING NEW PTS UNTIL THE BEGINNING OF NEXT YEAR. HE IS ADAMANT ABOUT SEEING HIM INSTEAD OF DR. CANAS.

## 2025-07-30 ENCOUNTER — TELEPHONE (OUTPATIENT)
Dept: CARDIOLOGY CLINIC | Age: 85
End: 2025-07-30